# Patient Record
Sex: MALE | Race: OTHER | HISPANIC OR LATINO
[De-identification: names, ages, dates, MRNs, and addresses within clinical notes are randomized per-mention and may not be internally consistent; named-entity substitution may affect disease eponyms.]

---

## 2017-05-30 ENCOUNTER — APPOINTMENT (OUTPATIENT)
Dept: FAMILY MEDICINE | Facility: CLINIC | Age: 62
End: 2017-05-30

## 2017-05-30 VITALS
BODY MASS INDEX: 29.98 KG/M2 | HEIGHT: 67 IN | WEIGHT: 191 LBS | RESPIRATION RATE: 13 BRPM | HEART RATE: 87 BPM | TEMPERATURE: 98 F | DIASTOLIC BLOOD PRESSURE: 78 MMHG | SYSTOLIC BLOOD PRESSURE: 136 MMHG | OXYGEN SATURATION: 98 %

## 2017-05-30 LAB
BILIRUB UR QL STRIP: NORMAL
CLARITY UR: NORMAL
COLLECTION METHOD: NORMAL
GLUCOSE UR-MCNC: NEGATIVE
HCG UR QL: 1 EU/DL
HGB UR QL STRIP.AUTO: NEGATIVE
KETONES UR-MCNC: NORMAL
LEUKOCYTE ESTERASE UR QL STRIP: NEGATIVE
NITRITE UR QL STRIP: NEGATIVE
PH UR STRIP: 5.5
PROT UR STRIP-MCNC: NORMAL
SP GR UR STRIP: 1.02

## 2018-03-15 ENCOUNTER — APPOINTMENT (OUTPATIENT)
Dept: FAMILY MEDICINE | Facility: CLINIC | Age: 63
End: 2018-03-15
Payer: COMMERCIAL

## 2018-03-15 ENCOUNTER — LABORATORY RESULT (OUTPATIENT)
Age: 63
End: 2018-03-15

## 2018-03-15 VITALS
SYSTOLIC BLOOD PRESSURE: 130 MMHG | BODY MASS INDEX: 30.29 KG/M2 | HEIGHT: 67 IN | HEART RATE: 96 BPM | RESPIRATION RATE: 13 BRPM | TEMPERATURE: 98.4 F | DIASTOLIC BLOOD PRESSURE: 76 MMHG | OXYGEN SATURATION: 98 % | WEIGHT: 193 LBS

## 2018-03-15 DIAGNOSIS — R10.2 PELVIC AND PERINEAL PAIN: ICD-10-CM

## 2018-03-15 DIAGNOSIS — Z87.39 PERSONAL HISTORY OF OTHER DISEASES OF THE MUSCULOSKELETAL SYSTEM AND CONNECTIVE TISSUE: ICD-10-CM

## 2018-03-15 PROCEDURE — 36415 COLL VENOUS BLD VENIPUNCTURE: CPT

## 2018-03-15 PROCEDURE — 99214 OFFICE O/P EST MOD 30 MIN: CPT | Mod: 25

## 2018-03-16 LAB
ALBUMIN SERPL ELPH-MCNC: 4.1 G/DL
ALP BLD-CCNC: 120 U/L
ALT SERPL-CCNC: 66 U/L
ANION GAP SERPL CALC-SCNC: 11 MMOL/L
APPEARANCE: CLEAR
AST SERPL-CCNC: 56 U/L
BASOPHILS # BLD AUTO: 0.03 K/UL
BASOPHILS NFR BLD AUTO: 0.4 %
BILIRUB SERPL-MCNC: 0.7 MG/DL
BILIRUBIN URINE: ABNORMAL
BLOOD URINE: NEGATIVE
BUN SERPL-MCNC: 10 MG/DL
CALCIUM SERPL-MCNC: 9.4 MG/DL
CHLORIDE SERPL-SCNC: 95 MMOL/L
CHOLEST SERPL-MCNC: 176 MG/DL
CHOLEST/HDLC SERPL: 3.5 RATIO
CO2 SERPL-SCNC: 29 MMOL/L
COLOR: ABNORMAL
CREAT SERPL-MCNC: 0.7 MG/DL
EOSINOPHIL # BLD AUTO: 0.1 K/UL
EOSINOPHIL NFR BLD AUTO: 1.4 %
FERRITIN SERPL-MCNC: 368 NG/ML
FOLATE SERPL-MCNC: 19.1 NG/ML
GLUCOSE QUALITATIVE U: 1000 MG/DL
GLUCOSE SERPL-MCNC: 287 MG/DL
HBA1C MFR BLD HPLC: 13.3 %
HCT VFR BLD CALC: 46.5 %
HDLC SERPL-MCNC: 50 MG/DL
HGB BLD-MCNC: 16.2 G/DL
IMM GRANULOCYTES NFR BLD AUTO: 0.1 %
IRON SATN MFR SERPL: 29 %
IRON SERPL-MCNC: 97 UG/DL
KETONES URINE: NEGATIVE
LDLC SERPL CALC-MCNC: 107 MG/DL
LEUKOCYTE ESTERASE URINE: NEGATIVE
LYMPHOCYTES # BLD AUTO: 1.85 K/UL
LYMPHOCYTES NFR BLD AUTO: 25.2 %
MAN DIFF?: NORMAL
MCHC RBC-ENTMCNC: 28.8 PG
MCHC RBC-ENTMCNC: 34.8 GM/DL
MCV RBC AUTO: 82.7 FL
MONOCYTES # BLD AUTO: 0.63 K/UL
MONOCYTES NFR BLD AUTO: 8.6 %
NEUTROPHILS # BLD AUTO: 4.73 K/UL
NEUTROPHILS NFR BLD AUTO: 64.3 %
NITRITE URINE: NEGATIVE
PH URINE: 5
PLATELET # BLD AUTO: 195 K/UL
POTASSIUM SERPL-SCNC: 4.4 MMOL/L
PROT SERPL-MCNC: 7.9 G/DL
PROTEIN URINE: 30 MG/DL
PSA SERPL-MCNC: 0.4 NG/ML
RBC # BLD: 5.62 M/UL
RBC # FLD: 13.2 %
SODIUM SERPL-SCNC: 135 MMOL/L
SPECIFIC GRAVITY URINE: 1.04
T4 SERPL-MCNC: 9.9 UG/DL
TIBC SERPL-MCNC: 334 UG/DL
TRIGL SERPL-MCNC: 94 MG/DL
TSH SERPL-ACNC: 1.15 UIU/ML
UIBC SERPL-MCNC: 237 UG/DL
UROBILINOGEN URINE: NEGATIVE MG/DL
VIT B12 SERPL-MCNC: 683 PG/ML
WBC # FLD AUTO: 7.35 K/UL

## 2018-03-19 LAB
25(OH)D3 SERPL-MCNC: 22.7 NG/ML
C PEPTIDE SERPL-MCNC: 3.8 NG/ML

## 2018-03-20 LAB
TESTOST BND SERPL-MCNC: 6.6 PG/ML
TESTOST SERPL-MCNC: 457.1 NG/DL

## 2018-03-23 ENCOUNTER — APPOINTMENT (OUTPATIENT)
Dept: FAMILY MEDICINE | Facility: CLINIC | Age: 63
End: 2018-03-23
Payer: COMMERCIAL

## 2018-03-23 VITALS
RESPIRATION RATE: 14 BRPM | BODY MASS INDEX: 30.13 KG/M2 | OXYGEN SATURATION: 98 % | HEART RATE: 106 BPM | WEIGHT: 192 LBS | TEMPERATURE: 98.2 F | HEIGHT: 67 IN | DIASTOLIC BLOOD PRESSURE: 82 MMHG | SYSTOLIC BLOOD PRESSURE: 124 MMHG

## 2018-03-23 PROCEDURE — 99214 OFFICE O/P EST MOD 30 MIN: CPT

## 2018-03-23 RX ORDER — GLIMEPIRIDE 2 MG/1
2 TABLET ORAL
Refills: 0 | Status: COMPLETED | COMMUNITY
End: 2018-03-23

## 2018-03-30 ENCOUNTER — APPOINTMENT (OUTPATIENT)
Dept: FAMILY MEDICINE | Facility: CLINIC | Age: 63
End: 2018-03-30
Payer: COMMERCIAL

## 2018-03-30 VITALS
SYSTOLIC BLOOD PRESSURE: 124 MMHG | DIASTOLIC BLOOD PRESSURE: 72 MMHG | HEIGHT: 67 IN | WEIGHT: 192 LBS | OXYGEN SATURATION: 98 % | RESPIRATION RATE: 13 BRPM | HEART RATE: 96 BPM | BODY MASS INDEX: 30.13 KG/M2

## 2018-03-30 PROCEDURE — 99214 OFFICE O/P EST MOD 30 MIN: CPT

## 2018-04-10 ENCOUNTER — APPOINTMENT (OUTPATIENT)
Dept: FAMILY MEDICINE | Facility: CLINIC | Age: 63
End: 2018-04-10
Payer: COMMERCIAL

## 2018-04-10 VITALS
DIASTOLIC BLOOD PRESSURE: 70 MMHG | HEIGHT: 67 IN | BODY MASS INDEX: 29.03 KG/M2 | OXYGEN SATURATION: 98 % | SYSTOLIC BLOOD PRESSURE: 122 MMHG | TEMPERATURE: 97.9 F | HEART RATE: 102 BPM | RESPIRATION RATE: 13 BRPM | WEIGHT: 185 LBS

## 2018-04-10 PROCEDURE — 99214 OFFICE O/P EST MOD 30 MIN: CPT

## 2018-04-18 ENCOUNTER — NON-APPOINTMENT (OUTPATIENT)
Age: 63
End: 2018-04-18

## 2018-04-18 ENCOUNTER — APPOINTMENT (OUTPATIENT)
Dept: CARDIOLOGY | Facility: CLINIC | Age: 63
End: 2018-04-18
Payer: COMMERCIAL

## 2018-04-18 VITALS
SYSTOLIC BLOOD PRESSURE: 117 MMHG | DIASTOLIC BLOOD PRESSURE: 76 MMHG | BODY MASS INDEX: 29.6 KG/M2 | OXYGEN SATURATION: 98 % | HEART RATE: 101 BPM | WEIGHT: 189 LBS

## 2018-04-18 PROCEDURE — 99204 OFFICE O/P NEW MOD 45 MIN: CPT

## 2018-04-18 PROCEDURE — 93000 ELECTROCARDIOGRAM COMPLETE: CPT

## 2018-04-18 RX ORDER — ASPIRIN 81 MG/1
81 TABLET, CHEWABLE ORAL
Qty: 30 | Refills: 5 | Status: COMPLETED | COMMUNITY
Start: 2018-04-18

## 2018-04-24 ENCOUNTER — APPOINTMENT (OUTPATIENT)
Dept: UROLOGY | Facility: CLINIC | Age: 63
End: 2018-04-24

## 2018-04-27 ENCOUNTER — APPOINTMENT (OUTPATIENT)
Dept: GASTROENTEROLOGY | Facility: CLINIC | Age: 63
End: 2018-04-27
Payer: COMMERCIAL

## 2018-04-27 VITALS
BODY MASS INDEX: 29.66 KG/M2 | HEART RATE: 101 BPM | OXYGEN SATURATION: 98 % | WEIGHT: 189 LBS | HEIGHT: 67 IN | DIASTOLIC BLOOD PRESSURE: 81 MMHG | RESPIRATION RATE: 16 BRPM | SYSTOLIC BLOOD PRESSURE: 135 MMHG

## 2018-04-27 DIAGNOSIS — Z86.73 PERSONAL HISTORY OF TRANSIENT ISCHEMIC ATTACK (TIA), AND CEREBRAL INFARCTION W/OUT RESIDUAL DEFICITS: ICD-10-CM

## 2018-04-27 PROCEDURE — 99203 OFFICE O/P NEW LOW 30 MIN: CPT

## 2018-05-08 ENCOUNTER — APPOINTMENT (OUTPATIENT)
Dept: FAMILY MEDICINE | Facility: CLINIC | Age: 63
End: 2018-05-08
Payer: COMMERCIAL

## 2018-05-08 ENCOUNTER — MED ADMIN CHARGE (OUTPATIENT)
Age: 63
End: 2018-05-08

## 2018-05-08 VITALS
OXYGEN SATURATION: 98 % | TEMPERATURE: 98.8 F | HEART RATE: 92 BPM | HEIGHT: 67 IN | RESPIRATION RATE: 13 BRPM | SYSTOLIC BLOOD PRESSURE: 120 MMHG | DIASTOLIC BLOOD PRESSURE: 70 MMHG | BODY MASS INDEX: 28.56 KG/M2 | WEIGHT: 182 LBS

## 2018-05-08 PROCEDURE — 90715 TDAP VACCINE 7 YRS/> IM: CPT

## 2018-05-08 PROCEDURE — 90471 IMMUNIZATION ADMIN: CPT | Mod: 59

## 2018-05-08 PROCEDURE — 99214 OFFICE O/P EST MOD 30 MIN: CPT | Mod: 25

## 2018-05-14 ENCOUNTER — APPOINTMENT (OUTPATIENT)
Dept: SURGERY | Facility: CLINIC | Age: 63
End: 2018-05-14

## 2018-05-17 ENCOUNTER — APPOINTMENT (OUTPATIENT)
Dept: CARDIOLOGY | Facility: CLINIC | Age: 63
End: 2018-05-17
Payer: COMMERCIAL

## 2018-05-17 PROCEDURE — 93015 CV STRESS TEST SUPVJ I&R: CPT

## 2018-05-17 PROCEDURE — 93306 TTE W/DOPPLER COMPLETE: CPT

## 2018-05-17 PROCEDURE — A9500: CPT

## 2018-05-17 PROCEDURE — 78452 HT MUSCLE IMAGE SPECT MULT: CPT

## 2018-05-21 ENCOUNTER — APPOINTMENT (OUTPATIENT)
Dept: SURGERY | Facility: CLINIC | Age: 63
End: 2018-05-21
Payer: COMMERCIAL

## 2018-05-21 VITALS
HEART RATE: 98 BPM | DIASTOLIC BLOOD PRESSURE: 83 MMHG | TEMPERATURE: 98.5 F | OXYGEN SATURATION: 98 % | SYSTOLIC BLOOD PRESSURE: 130 MMHG | RESPIRATION RATE: 13 BRPM | BODY MASS INDEX: 28.56 KG/M2 | WEIGHT: 182 LBS | HEIGHT: 67 IN

## 2018-05-21 PROCEDURE — 99204 OFFICE O/P NEW MOD 45 MIN: CPT

## 2018-06-18 ENCOUNTER — APPOINTMENT (OUTPATIENT)
Dept: SURGERY | Facility: CLINIC | Age: 63
End: 2018-06-18
Payer: MEDICARE

## 2018-06-18 VITALS
RESPIRATION RATE: 13 BRPM | BODY MASS INDEX: 28.25 KG/M2 | DIASTOLIC BLOOD PRESSURE: 76 MMHG | HEART RATE: 95 BPM | OXYGEN SATURATION: 97 % | HEIGHT: 67 IN | SYSTOLIC BLOOD PRESSURE: 120 MMHG | WEIGHT: 180 LBS | TEMPERATURE: 98.9 F

## 2018-06-18 PROCEDURE — 99214 OFFICE O/P EST MOD 30 MIN: CPT

## 2018-07-03 ENCOUNTER — APPOINTMENT (OUTPATIENT)
Dept: GASTROENTEROLOGY | Facility: CLINIC | Age: 63
End: 2018-07-03
Payer: MEDICARE

## 2018-07-03 VITALS
HEIGHT: 67 IN | SYSTOLIC BLOOD PRESSURE: 130 MMHG | DIASTOLIC BLOOD PRESSURE: 70 MMHG | WEIGHT: 180 LBS | BODY MASS INDEX: 28.25 KG/M2

## 2018-07-03 PROCEDURE — 99214 OFFICE O/P EST MOD 30 MIN: CPT

## 2018-07-03 RX ORDER — AMOXICILLIN AND CLAVULANATE POTASSIUM 875; 125 MG/1; MG/1
875-125 TABLET, COATED ORAL
Qty: 14 | Refills: 0 | Status: COMPLETED | COMMUNITY
Start: 2018-05-08 | End: 2018-06-19

## 2018-07-07 ENCOUNTER — APPOINTMENT (OUTPATIENT)
Dept: FAMILY MEDICINE | Facility: CLINIC | Age: 63
End: 2018-07-07
Payer: MEDICARE

## 2018-07-07 ENCOUNTER — APPOINTMENT (OUTPATIENT)
Dept: FAMILY MEDICINE | Facility: CLINIC | Age: 63
End: 2018-07-07

## 2018-07-07 VITALS
TEMPERATURE: 98.8 F | RESPIRATION RATE: 13 BRPM | HEART RATE: 90 BPM | WEIGHT: 180 LBS | OXYGEN SATURATION: 97 % | BODY MASS INDEX: 28.25 KG/M2 | DIASTOLIC BLOOD PRESSURE: 82 MMHG | SYSTOLIC BLOOD PRESSURE: 124 MMHG | HEIGHT: 67 IN

## 2018-07-07 PROCEDURE — 36415 COLL VENOUS BLD VENIPUNCTURE: CPT

## 2018-07-07 PROCEDURE — 99214 OFFICE O/P EST MOD 30 MIN: CPT | Mod: 25

## 2018-07-07 NOTE — REVIEW OF SYSTEMS
[Fatigue] : fatigue [Vision Problems] : vision problems [Joint Pain] : joint pain [Joint Stiffness] : joint stiffness [Negative] : Heme/Lymph

## 2018-07-07 NOTE — HISTORY OF PRESENT ILLNESS
[de-identified] : Patient present for follow up of health care . Diet is good and drinking water daily. Compliant with medications . Patient stated he fell 2 months ago. He was home and fell down stairs. Some injury to right knee\par H/O anxiety attacks.

## 2018-07-07 NOTE — HEALTH RISK ASSESSMENT
[Any fall with injury in past year] : Patient reported fall with injury in the past year [3] : 2) Feeling down, depressed, or hopeless for nearly every day (3) [] : No [de-identified] : Social

## 2018-07-07 NOTE — PHYSICAL EXAM
[No Acute Distress] : no acute distress [Well Nourished] : well nourished [Well Developed] : well developed [Normal Sclera/Conjunctiva] : normal sclera/conjunctiva [PERRL] : pupils equal round and reactive to light [Normal Outer Ear/Nose] : the outer ears and nose were normal in appearance [Normal Oropharynx] : the oropharynx was normal [Supple] : supple [No Lymphadenopathy] : no lymphadenopathy [No Respiratory Distress] : no respiratory distress  [Clear to Auscultation] : lungs were clear to auscultation bilaterally [Normal Rate] : normal rate  [Regular Rhythm] : with a regular rhythm [Normal Gait] : normal gait [Coordination Grossly Intact] : coordination grossly intact [No Focal Deficits] : no focal deficits [Normal Affect] : the affect was normal [Normal Insight/Judgement] : insight and judgment were intact

## 2018-07-10 LAB
25(OH)D3 SERPL-MCNC: 47.5 NG/ML
ALBUMIN SERPL ELPH-MCNC: 4.6 G/DL
ALP BLD-CCNC: 90 U/L
ALT SERPL-CCNC: 23 U/L
ANION GAP SERPL CALC-SCNC: 16 MMOL/L
APPEARANCE: CLEAR
AST SERPL-CCNC: 29 U/L
BACTERIA: NEGATIVE
BASOPHILS # BLD AUTO: 0.02 K/UL
BASOPHILS NFR BLD AUTO: 0.2 %
BILIRUB SERPL-MCNC: 0.5 MG/DL
BILIRUBIN URINE: NEGATIVE
BLOOD URINE: NEGATIVE
BUN SERPL-MCNC: 16 MG/DL
CALCIUM SERPL-MCNC: 10 MG/DL
CHLORIDE SERPL-SCNC: 100 MMOL/L
CHOLEST SERPL-MCNC: 181 MG/DL
CHOLEST/HDLC SERPL: 2.6 RATIO
CO2 SERPL-SCNC: 24 MMOL/L
COLOR: YELLOW
CREAT SERPL-MCNC: 0.75 MG/DL
CREAT SPEC-SCNC: 16 MG/DL
EOSINOPHIL # BLD AUTO: 0.15 K/UL
EOSINOPHIL NFR BLD AUTO: 1.8 %
GLUCOSE QUALITATIVE U: 500 MG/DL
GLUCOSE SERPL-MCNC: 95 MG/DL
HBA1C MFR BLD HPLC: 6.1 %
HCT VFR BLD CALC: 45.9 %
HDLC SERPL-MCNC: 69 MG/DL
HGB BLD-MCNC: 15.2 G/DL
HYALINE CASTS: 0 /LPF
IMM GRANULOCYTES NFR BLD AUTO: 0.2 %
KETONES URINE: NEGATIVE
LDLC SERPL CALC-MCNC: 92 MG/DL
LEUKOCYTE ESTERASE URINE: NEGATIVE
LYMPHOCYTES # BLD AUTO: 1.52 K/UL
LYMPHOCYTES NFR BLD AUTO: 18.4 %
MAN DIFF?: NORMAL
MCHC RBC-ENTMCNC: 28.4 PG
MCHC RBC-ENTMCNC: 33.1 GM/DL
MCV RBC AUTO: 85.8 FL
MICROALBUMIN 24H UR DL<=1MG/L-MCNC: 0.4 MG/DL
MICROALBUMIN/CREAT 24H UR-RTO: 25 MG/G
MICROSCOPIC-UA: NORMAL
MONOCYTES # BLD AUTO: 0.67 K/UL
MONOCYTES NFR BLD AUTO: 8.1 %
NEUTROPHILS # BLD AUTO: 5.88 K/UL
NEUTROPHILS NFR BLD AUTO: 71.3 %
NITRITE URINE: NEGATIVE
PH URINE: 6
PLATELET # BLD AUTO: 246 K/UL
POTASSIUM SERPL-SCNC: 4.2 MMOL/L
PROT SERPL-MCNC: 8 G/DL
PROTEIN URINE: NEGATIVE MG/DL
PSA FREE FLD-MCNC: 26.7
PSA FREE SERPL-MCNC: 0.12 NG/ML
PSA SERPL-MCNC: 0.45 NG/ML
RBC # BLD: 5.35 M/UL
RBC # FLD: 13.4 %
RED BLOOD CELLS URINE: 0 /HPF
SODIUM SERPL-SCNC: 140 MMOL/L
SPECIFIC GRAVITY URINE: 1.01
SQUAMOUS EPITHELIAL CELLS: 5 /HPF
TRIGL SERPL-MCNC: 102 MG/DL
TSH SERPL-ACNC: 1.55 UIU/ML
UROBILINOGEN URINE: NEGATIVE MG/DL
WBC # FLD AUTO: 8.26 K/UL
WHITE BLOOD CELLS URINE: 0 /HPF

## 2018-07-18 ENCOUNTER — APPOINTMENT (OUTPATIENT)
Dept: CARDIOLOGY | Facility: CLINIC | Age: 63
End: 2018-07-18
Payer: MEDICARE

## 2018-07-18 ENCOUNTER — NON-APPOINTMENT (OUTPATIENT)
Age: 63
End: 2018-07-18

## 2018-07-18 VITALS
HEIGHT: 67 IN | BODY MASS INDEX: 28.56 KG/M2 | HEART RATE: 92 BPM | DIASTOLIC BLOOD PRESSURE: 78 MMHG | SYSTOLIC BLOOD PRESSURE: 130 MMHG | WEIGHT: 182 LBS | OXYGEN SATURATION: 98 %

## 2018-07-18 VITALS — SYSTOLIC BLOOD PRESSURE: 150 MMHG | DIASTOLIC BLOOD PRESSURE: 86 MMHG

## 2018-07-18 PROCEDURE — 99214 OFFICE O/P EST MOD 30 MIN: CPT | Mod: 25

## 2018-07-18 PROCEDURE — 93000 ELECTROCARDIOGRAM COMPLETE: CPT

## 2018-07-22 PROBLEM — Z86.73 HISTORY OF STROKE: Status: RESOLVED | Noted: 2018-04-18 | Resolved: 2018-07-22

## 2018-07-28 ENCOUNTER — APPOINTMENT (OUTPATIENT)
Dept: FAMILY MEDICINE | Facility: CLINIC | Age: 63
End: 2018-07-28
Payer: MEDICARE

## 2018-07-28 VITALS
RESPIRATION RATE: 13 BRPM | BODY MASS INDEX: 28.56 KG/M2 | HEIGHT: 67 IN | DIASTOLIC BLOOD PRESSURE: 84 MMHG | OXYGEN SATURATION: 97 % | SYSTOLIC BLOOD PRESSURE: 122 MMHG | TEMPERATURE: 98.8 F | HEART RATE: 86 BPM | WEIGHT: 182 LBS

## 2018-07-28 PROCEDURE — 36415 COLL VENOUS BLD VENIPUNCTURE: CPT

## 2018-07-28 PROCEDURE — 99214 OFFICE O/P EST MOD 30 MIN: CPT | Mod: 25

## 2018-07-30 LAB
HCV AB SER QL: NONREACTIVE
HCV S/CO RATIO: 0.1 S/CO
HIV1+2 AB SPEC QL IA.RAPID: NONREACTIVE

## 2018-09-21 ENCOUNTER — APPOINTMENT (OUTPATIENT)
Dept: GASTROENTEROLOGY | Facility: GI CENTER | Age: 63
End: 2018-09-21

## 2018-10-06 ENCOUNTER — OUTPATIENT (OUTPATIENT)
Dept: OUTPATIENT SERVICES | Facility: HOSPITAL | Age: 63
LOS: 1 days | End: 2018-10-06
Payer: MEDICARE

## 2018-10-06 ENCOUNTER — APPOINTMENT (OUTPATIENT)
Dept: CT IMAGING | Facility: CLINIC | Age: 63
End: 2018-10-06
Payer: MEDICARE

## 2018-10-06 DIAGNOSIS — R10.31 RIGHT LOWER QUADRANT PAIN: ICD-10-CM

## 2018-10-06 PROCEDURE — 74177 CT ABD & PELVIS W/CONTRAST: CPT

## 2018-10-06 PROCEDURE — 74177 CT ABD & PELVIS W/CONTRAST: CPT | Mod: 26

## 2018-10-27 ENCOUNTER — APPOINTMENT (OUTPATIENT)
Dept: FAMILY MEDICINE | Facility: CLINIC | Age: 63
End: 2018-10-27

## 2018-11-02 ENCOUNTER — APPOINTMENT (OUTPATIENT)
Dept: SURGERY | Facility: CLINIC | Age: 63
End: 2018-11-02
Payer: MEDICARE

## 2018-11-02 VITALS
DIASTOLIC BLOOD PRESSURE: 91 MMHG | BODY MASS INDEX: 31.39 KG/M2 | OXYGEN SATURATION: 98 % | HEIGHT: 67 IN | WEIGHT: 200 LBS | SYSTOLIC BLOOD PRESSURE: 145 MMHG | TEMPERATURE: 97.9 F | HEART RATE: 87 BPM

## 2018-11-02 PROCEDURE — 99214 OFFICE O/P EST MOD 30 MIN: CPT

## 2019-01-22 RX ORDER — POLYETHYLENE GLYOCOL 3350, SODIUM CHLORIDE, SODIUM BICARBONATE AND POTASSIUM CHLORIDE 420; 11.2; 5.72; 1.48 G/4L; G/4L; G/4L; G/4L
420 POWDER, FOR SOLUTION NASOGASTRIC; ORAL
Qty: 1 | Refills: 0 | Status: DISCONTINUED | COMMUNITY
Start: 2018-07-03 | End: 2019-01-22

## 2019-01-23 ENCOUNTER — APPOINTMENT (OUTPATIENT)
Dept: CARDIOLOGY | Facility: CLINIC | Age: 64
End: 2019-01-23

## 2019-02-04 ENCOUNTER — APPOINTMENT (OUTPATIENT)
Dept: SURGERY | Facility: CLINIC | Age: 64
End: 2019-02-04

## 2019-02-19 ENCOUNTER — APPOINTMENT (OUTPATIENT)
Dept: FAMILY MEDICINE | Facility: CLINIC | Age: 64
End: 2019-02-19
Payer: MEDICARE

## 2019-02-19 ENCOUNTER — LABORATORY RESULT (OUTPATIENT)
Age: 64
End: 2019-02-19

## 2019-02-19 VITALS
WEIGHT: 200 LBS | HEIGHT: 67 IN | DIASTOLIC BLOOD PRESSURE: 80 MMHG | OXYGEN SATURATION: 98 % | BODY MASS INDEX: 31.39 KG/M2 | TEMPERATURE: 98.2 F | SYSTOLIC BLOOD PRESSURE: 134 MMHG | RESPIRATION RATE: 13 BRPM | HEART RATE: 78 BPM
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
## 2019-02-19 DIAGNOSIS — R10.31 RIGHT LOWER QUADRANT PAIN: ICD-10-CM

## 2019-02-19 PROCEDURE — 36415 COLL VENOUS BLD VENIPUNCTURE: CPT

## 2019-02-19 PROCEDURE — 99214 OFFICE O/P EST MOD 30 MIN: CPT | Mod: 25

## 2019-02-21 LAB
ALBUMIN SERPL ELPH-MCNC: 4.4 G/DL
ALP BLD-CCNC: 99 U/L
ALT SERPL-CCNC: 41 U/L
ANION GAP SERPL CALC-SCNC: 19 MMOL/L
APPEARANCE: ABNORMAL
AST SERPL-CCNC: 42 U/L
BASOPHILS # BLD AUTO: 0.03 K/UL
BASOPHILS NFR BLD AUTO: 0.5 %
BILIRUB SERPL-MCNC: 0.4 MG/DL
BILIRUBIN URINE: NEGATIVE
BLOOD URINE: NEGATIVE
BUN SERPL-MCNC: 15 MG/DL
C PEPTIDE SERPL-MCNC: 5.6 NG/ML
CALCIUM SERPL-MCNC: 9.8 MG/DL
CHLORIDE SERPL-SCNC: 102 MMOL/L
CHOLEST SERPL-MCNC: 148 MG/DL
CHOLEST/HDLC SERPL: 3.7 RATIO
CO2 SERPL-SCNC: 22 MMOL/L
COLOR: ABNORMAL
CREAT SERPL-MCNC: 0.73 MG/DL
CREAT SPEC-SCNC: 195 MG/DL
EOSINOPHIL # BLD AUTO: 0.2 K/UL
EOSINOPHIL NFR BLD AUTO: 3.1 %
FERRITIN SERPL-MCNC: 297 NG/ML
FOLATE SERPL-MCNC: >20 NG/ML
GLUCOSE QUALITATIVE U: ABNORMAL
GLUCOSE SERPL-MCNC: 86 MG/DL
HBA1C MFR BLD HPLC: 7.2 %
HCT VFR BLD CALC: 49.7 %
HDLC SERPL-MCNC: 40 MG/DL
HGB BLD-MCNC: 15.7 G/DL
IMM GRANULOCYTES NFR BLD AUTO: 0.2 %
IRON SATN MFR SERPL: 17 %
IRON SERPL-MCNC: 59 UG/DL
KETONES URINE: NEGATIVE
LDLC SERPL CALC-MCNC: 87 MG/DL
LEUKOCYTE ESTERASE URINE: NEGATIVE
LYMPHOCYTES # BLD AUTO: 1.57 K/UL
LYMPHOCYTES NFR BLD AUTO: 24.6 %
MAN DIFF?: NORMAL
MCHC RBC-ENTMCNC: 27.7 PG
MCHC RBC-ENTMCNC: 31.6 GM/DL
MCV RBC AUTO: 87.7 FL
MICROALBUMIN 24H UR DL<=1MG/L-MCNC: 16.4 MG/DL
MICROALBUMIN/CREAT 24H UR-RTO: 84 MG/G
MONOCYTES # BLD AUTO: 0.92 K/UL
MONOCYTES NFR BLD AUTO: 14.4 %
NEUTROPHILS # BLD AUTO: 3.64 K/UL
NEUTROPHILS NFR BLD AUTO: 57.2 %
NITRITE URINE: NEGATIVE
PH URINE: 5.5
PLATELET # BLD AUTO: 201 K/UL
POTASSIUM SERPL-SCNC: 3.9 MMOL/L
PROT SERPL-MCNC: 8 G/DL
PROTEIN URINE: ABNORMAL
PSA SERPL-MCNC: 0.2 NG/ML
RBC # BLD: 5.67 M/UL
RBC # FLD: 13.1 %
SODIUM SERPL-SCNC: 143 MMOL/L
SPECIFIC GRAVITY URINE: 1.05
T4 SERPL-MCNC: 8.4 UG/DL
TIBC SERPL-MCNC: 349 UG/DL
TRIGL SERPL-MCNC: 105 MG/DL
TSH SERPL-ACNC: 2.95 UIU/ML
UIBC SERPL-MCNC: 290 UG/DL
UROBILINOGEN URINE: NORMAL
VIT B12 SERPL-MCNC: 462 PG/ML
WBC # FLD AUTO: 6.37 K/UL

## 2019-02-25 LAB — 25(OH)D3 SERPL-MCNC: 33.3 NG/ML

## 2019-03-12 ENCOUNTER — APPOINTMENT (OUTPATIENT)
Dept: FAMILY MEDICINE | Facility: CLINIC | Age: 64
End: 2019-03-12
Payer: MEDICARE

## 2019-03-12 VITALS
TEMPERATURE: 98.5 F | DIASTOLIC BLOOD PRESSURE: 72 MMHG | OXYGEN SATURATION: 98 % | RESPIRATION RATE: 13 BRPM | HEART RATE: 96 BPM | BODY MASS INDEX: 33.27 KG/M2 | WEIGHT: 212 LBS | HEIGHT: 67 IN | SYSTOLIC BLOOD PRESSURE: 124 MMHG

## 2019-03-12 DIAGNOSIS — J06.9 ACUTE UPPER RESPIRATORY INFECTION, UNSPECIFIED: ICD-10-CM

## 2019-03-12 DIAGNOSIS — Z87.891 PERSONAL HISTORY OF NICOTINE DEPENDENCE: ICD-10-CM

## 2019-03-12 PROCEDURE — 99214 OFFICE O/P EST MOD 30 MIN: CPT

## 2019-03-14 LAB — HEMOCCULT STL QL IA: NEGATIVE

## 2019-07-16 ENCOUNTER — APPOINTMENT (OUTPATIENT)
Dept: FAMILY MEDICINE | Facility: CLINIC | Age: 64
End: 2019-07-16

## 2019-07-29 ENCOUNTER — APPOINTMENT (OUTPATIENT)
Dept: SURGERY | Facility: CLINIC | Age: 64
End: 2019-07-29
Payer: MEDICARE

## 2019-07-29 VITALS
TEMPERATURE: 98.1 F | BODY MASS INDEX: 31.64 KG/M2 | HEART RATE: 108 BPM | WEIGHT: 202 LBS | DIASTOLIC BLOOD PRESSURE: 96 MMHG | OXYGEN SATURATION: 98 % | SYSTOLIC BLOOD PRESSURE: 142 MMHG

## 2019-07-29 PROCEDURE — 99214 OFFICE O/P EST MOD 30 MIN: CPT

## 2019-07-29 NOTE — HISTORY OF PRESENT ILLNESS
[de-identified] : The patient returns to the office with intermittent mild burning discomfort with activity.  He states that he has not been able to lose any weight.  He has no nausea no vomit, no fevers or chills. He notes no changes in the hernias.

## 2019-07-29 NOTE — ASSESSMENT
[FreeTextEntry1] : The patient is an obese 64 year old male with stable recurrent bilateral inguinal hernias and a stable umbilical hernia.  The patient has been advised that weight loss will be an important adjunct to help potentially reduce the risks of infection, hernia recurrence, and chronic post operative pain associated with surgery by potentially reducing the tension along the abdominal wall.  The patient will embark on weigh loss and follow up in 3 months or sooner should any problems arise. \par

## 2019-07-29 NOTE — PHYSICAL EXAM
[JVD] : no jugular venous distention  [No Rash or Lesion] : No rash or lesion [Purpura] : no purpura  [Petechiae] : no petechiae [Skin Ulcer] : no ulcer [Skin Induration] : no induration [Alert] : alert [Oriented to Person] : oriented to person [Oriented to Place] : oriented to place [Oriented to Time] : oriented to time [Calm] : calm [de-identified] : NC/AT PERRL EOMI no scleral icterus [de-identified] : non toxic, in no acute distress [de-identified] : trachea midline, no gross mass [de-identified] : phallus normal, no scrotal mass or tenderness [de-identified] : no audible wheezing or stridor [de-identified] : obese soft, redundant pannus of the lower abdominal wall, no tenderness, no guarding, no rebound  [de-identified] : FROM of all extremities with no gross deformity or angulation, there is a small non tender reducible umbilical hernia that remains stable, there remains evidence of recurrent inguinal hernias  on the left and the right side that was not apparent on prior exam [de-identified] : bilateral inguinal scars consistent with the patient's prior surgical history [de-identified] : mood is calm

## 2019-10-23 ENCOUNTER — APPOINTMENT (OUTPATIENT)
Dept: SURGERY | Facility: CLINIC | Age: 64
End: 2019-10-23

## 2020-05-05 ENCOUNTER — NON-APPOINTMENT (OUTPATIENT)
Age: 65
End: 2020-05-05

## 2020-05-05 ENCOUNTER — APPOINTMENT (OUTPATIENT)
Dept: FAMILY MEDICINE | Facility: CLINIC | Age: 65
End: 2020-05-05
Payer: MEDICARE

## 2020-05-05 VITALS
BODY MASS INDEX: 31.71 KG/M2 | SYSTOLIC BLOOD PRESSURE: 134 MMHG | RESPIRATION RATE: 13 BRPM | OXYGEN SATURATION: 98 % | WEIGHT: 202 LBS | HEART RATE: 91 BPM | DIASTOLIC BLOOD PRESSURE: 78 MMHG | HEIGHT: 67 IN

## 2020-05-05 DIAGNOSIS — K46.9 UNSPECIFIED ABDOMINAL HERNIA W/OUT OBSTRUCTION OR GANGRENE: ICD-10-CM

## 2020-05-05 DIAGNOSIS — Z12.11 ENCOUNTER FOR SCREENING FOR MALIGNANT NEOPLASM OF COLON: ICD-10-CM

## 2020-05-05 DIAGNOSIS — Z12.5 ENCOUNTER FOR SCREENING FOR MALIGNANT NEOPLASM OF PROSTATE: ICD-10-CM

## 2020-05-05 DIAGNOSIS — Z87.898 PERSONAL HISTORY OF OTHER SPECIFIED CONDITIONS: ICD-10-CM

## 2020-05-05 DIAGNOSIS — K40.21 BILATERAL INGUINAL HERNIA, W/OUT OBSTRUCTION OR GANGRENE, RECURRENT: ICD-10-CM

## 2020-05-05 LAB — HBA1C MFR BLD HPLC: 14

## 2020-05-05 PROCEDURE — 93000 ELECTROCARDIOGRAM COMPLETE: CPT | Mod: 59

## 2020-05-05 PROCEDURE — 99214 OFFICE O/P EST MOD 30 MIN: CPT | Mod: 25

## 2020-05-05 PROCEDURE — 83036 HEMOGLOBIN GLYCOSYLATED A1C: CPT | Mod: QW

## 2020-05-05 RX ORDER — AZITHROMYCIN 250 MG/1
250 TABLET, FILM COATED ORAL
Qty: 1 | Refills: 2 | Status: DISCONTINUED | COMMUNITY
Start: 2019-02-19 | End: 2020-05-05

## 2020-05-05 RX ORDER — MELOXICAM 7.5 MG/1
7.5 TABLET ORAL
Qty: 30 | Refills: 0 | Status: DISCONTINUED | COMMUNITY
Start: 2018-10-27 | End: 2020-05-05

## 2020-05-05 RX ORDER — SILDENAFIL 100 MG/1
100 TABLET, FILM COATED ORAL
Qty: 6 | Refills: 5 | Status: DISCONTINUED | COMMUNITY
Start: 2019-02-19 | End: 2020-05-05

## 2020-05-05 NOTE — PHYSICAL EXAM
[Well Nourished] : well nourished [No Acute Distress] : no acute distress [Normal Sclera/Conjunctiva] : normal sclera/conjunctiva [PERRL] : pupils equal round and reactive to light [Normal Outer Ear/Nose] : the outer ears and nose were normal in appearance [EOMI] : extraocular movements intact [No JVD] : no jugular venous distention [Normal Oropharynx] : the oropharynx was normal [Supple] : supple [No Lymphadenopathy] : no lymphadenopathy [Thyroid Normal, No Nodules] : the thyroid was normal and there were no nodules present [No Respiratory Distress] : no respiratory distress  [No Accessory Muscle Use] : no accessory muscle use [Clear to Auscultation] : lungs were clear to auscultation bilaterally [Normal Rate] : normal rate  [Regular Rhythm] : with a regular rhythm [Normal S1, S2] : normal S1 and S2 [No Murmur] : no murmur heard [No Carotid Bruits] : no carotid bruits [No Abdominal Bruit] : a ~M bruit was not heard ~T in the abdomen [No Varicosities] : no varicosities [Pedal Pulses Present] : the pedal pulses are present [No Edema] : there was no peripheral edema [No Palpable Aorta] : no palpable aorta [Soft] : abdomen soft [No Extremity Clubbing/Cyanosis] : no extremity clubbing/cyanosis [Non Tender] : non-tender [Non-distended] : non-distended [No HSM] : no HSM [No Masses] : no abdominal mass palpated [Normal Posterior Cervical Nodes] : no posterior cervical lymphadenopathy [Normal Bowel Sounds] : normal bowel sounds [Normal Anterior Cervical Nodes] : no anterior cervical lymphadenopathy [No CVA Tenderness] : no CVA  tenderness [No Spinal Tenderness] : no spinal tenderness [Grossly Normal Strength/Tone] : grossly normal strength/tone [No Joint Swelling] : no joint swelling [No Rash] : no rash [Coordination Grossly Intact] : coordination grossly intact [No Focal Deficits] : no focal deficits [Normal Gait] : normal gait [Deep Tendon Reflexes (DTR)] : deep tendon reflexes were 2+ and symmetric [Normal Affect] : the affect was normal [Normal Insight/Judgement] : insight and judgment were intact [de-identified] : obese [Normal] : affect was normal and insight and judgment were intact [de-identified] : ankle edema

## 2020-05-05 NOTE — HISTORY OF PRESENT ILLNESS
[FreeTextEntry1] : Patient in for chest tightness and back pain, has not taken Meds in months had gone to [place], patient reports some weight gain and SOBOE has not taken Meds in months not following diet or checking glucose last time was 8 months

## 2020-05-05 NOTE — HEALTH RISK ASSESSMENT
[2 - 4 times a month (2 pts)] : 2-4 times a month (2 points) [] : No [1 or 2 (0 pts)] : 1 or 2 (0 points) [Never (0 pts)] : Never (0 points) [No] : In the past 12 months have you used drugs other than those required for medical reasons? No [No falls in past year] : Patient reported no falls in the past year [0] : 2) Feeling down, depressed, or hopeless: Not at all (0) [Audit-CScore] : 2 [[id number]] : 0

## 2020-05-05 NOTE — PLAN
[FreeTextEntry1] : Patient in for chest tightness and back pain, has not taken Meds in months had gone to [place], patient reports some weight gain and SOBOE has not taken Meds in months not following diet or checking glucose \par \par Ekg reviewed\par POCT A1c  14 or greater\par Meds provided\par CXR \par EKG reviewed  Cardio provided\par Diabetic care plan \par Extended visit for non compliance issues\par RTC 2 week s

## 2020-05-05 NOTE — REVIEW OF SYSTEMS
[Fatigue] : fatigue [Chest Pain] : chest pain [Palpitations] : palpitations [Lower Ext Edema] : lower extremity edema [Paroxysmal Nocturnal Dyspnea] : paroxysmal nocturnal dyspnea [Back Pain] : back pain [Negative] : Heme/Lymph [Fever] : no fever [Night Sweats] : no night sweats [Earache] : no earache [Hearing Loss] : no hearing loss [Nosebleeds] : no nosebleeds [Nasal Discharge] : no nasal discharge [Postnasal Drip] : no postnasal drip [Sore Throat] : no sore throat [Cough] : no cough [Hoarseness] : no hoarseness [Heartburn] : no heartburn [Nausea] : no nausea [Melena] : no melena [Hesitancy] : no hesitancy [Muscle Weakness] : no muscle weakness [Joint Pain] : no joint pain [Mole Changes] : no mole changes [Itching] : no itching [Hair Changes] : no hair changes [Insomnia] : no insomnia [Depression] : no depression

## 2020-05-05 NOTE — COUNSELING
[Fall prevention counseling provided] : Fall prevention counseling provided [Use proper foot wear] : Use proper foot wear [Behavioral health counseling provided] : Behavioral health counseling provided [Engage in a relaxing activity] : Engage in a relaxing activity [Potential consequences of obesity discussed] : Potential consequences of obesity discussed [Benefits of weight loss discussed] : Benefits of weight loss discussed [Encouraged to increase physical activity] : Encouraged to increase physical activity [Encouraged to use exercise tracking device] : Encouraged to use exercise tracking device [Weigh Self Weekly] : weigh self weekly [Patient Non-adherent to care plan] : Patient non-adherent to care plan [Patient motivation] : Patient motivation [Needs reinforcement, provided] : Patient needs reinforcement on understanding of lifestyle changes and steps needed to achieve self management goal; reinforcement was provided

## 2020-05-06 LAB
APPEARANCE: CLEAR
BASOPHILS # BLD AUTO: 0.05 K/UL
BASOPHILS NFR BLD AUTO: 0.7 %
BILIRUBIN URINE: NEGATIVE
BLOOD URINE: NEGATIVE
CHOLEST SERPL-MCNC: 157 MG/DL
CHOLEST/HDLC SERPL: 3 RATIO
COLOR: NORMAL
EOSINOPHIL # BLD AUTO: 0.12 K/UL
EOSINOPHIL NFR BLD AUTO: 1.6 %
ERYTHROCYTE [SEDIMENTATION RATE] IN BLOOD BY WESTERGREN METHOD: 54 MM/HR
FOLATE SERPL-MCNC: >20 NG/ML
GLUCOSE QUALITATIVE U: ABNORMAL
HCT VFR BLD CALC: 44.5 %
HDLC SERPL-MCNC: 52 MG/DL
HGB BLD-MCNC: 14.5 G/DL
IMM GRANULOCYTES NFR BLD AUTO: 0.3 %
KETONES URINE: NEGATIVE
LDLC SERPL CALC-MCNC: 87 MG/DL
LEUKOCYTE ESTERASE URINE: NEGATIVE
LYMPHOCYTES # BLD AUTO: 2.11 K/UL
LYMPHOCYTES NFR BLD AUTO: 28.3 %
MAN DIFF?: NORMAL
MCHC RBC-ENTMCNC: 28.2 PG
MCHC RBC-ENTMCNC: 32.6 GM/DL
MCV RBC AUTO: 86.4 FL
MONOCYTES # BLD AUTO: 0.77 K/UL
MONOCYTES NFR BLD AUTO: 10.3 %
NEUTROPHILS # BLD AUTO: 4.39 K/UL
NEUTROPHILS NFR BLD AUTO: 58.8 %
NITRITE URINE: NEGATIVE
PH URINE: 5.5
PLATELET # BLD AUTO: 220 K/UL
PROTEIN URINE: NEGATIVE
RBC # BLD: 5.15 M/UL
RBC # FLD: 12.4 %
SPECIFIC GRAVITY URINE: 1.01
TRIGL SERPL-MCNC: 90 MG/DL
TSH SERPL-ACNC: 1.58 UIU/ML
UROBILINOGEN URINE: NORMAL
VIT B12 SERPL-MCNC: 563 PG/ML
WBC # FLD AUTO: 7.46 K/UL

## 2020-05-07 ENCOUNTER — OUTPATIENT (OUTPATIENT)
Dept: OUTPATIENT SERVICES | Facility: HOSPITAL | Age: 65
LOS: 1 days | End: 2020-05-07
Payer: MEDICARE

## 2020-05-07 ENCOUNTER — APPOINTMENT (OUTPATIENT)
Dept: RADIOLOGY | Facility: CLINIC | Age: 65
End: 2020-05-07
Payer: MEDICARE

## 2020-05-07 DIAGNOSIS — R07.9 CHEST PAIN, UNSPECIFIED: ICD-10-CM

## 2020-05-07 DIAGNOSIS — Z00.00 ENCOUNTER FOR GENERAL ADULT MEDICAL EXAMINATION WITHOUT ABNORMAL FINDINGS: ICD-10-CM

## 2020-05-07 PROCEDURE — 71046 X-RAY EXAM CHEST 2 VIEWS: CPT | Mod: 26

## 2020-05-07 PROCEDURE — 71046 X-RAY EXAM CHEST 2 VIEWS: CPT

## 2020-05-08 LAB
ALBUMIN SERPL ELPH-MCNC: 4.3 G/DL
ALP BLD-CCNC: 87 U/L
ALT SERPL-CCNC: 46 U/L
ANION GAP SERPL CALC-SCNC: 11 MMOL/L
AST SERPL-CCNC: 53 U/L
BILIRUB SERPL-MCNC: 0.4 MG/DL
BUN SERPL-MCNC: 11 MG/DL
CALCIUM SERPL-MCNC: 9.6 MG/DL
CHLORIDE SERPL-SCNC: 99 MMOL/L
CO2 SERPL-SCNC: 26 MMOL/L
CREAT SERPL-MCNC: 0.57 MG/DL
GLUCOSE SERPL-MCNC: 241 MG/DL
POTASSIUM SERPL-SCNC: 4.1 MMOL/L
PROT SERPL-MCNC: 7 G/DL
SODIUM SERPL-SCNC: 137 MMOL/L

## 2020-05-19 ENCOUNTER — APPOINTMENT (OUTPATIENT)
Dept: FAMILY MEDICINE | Facility: CLINIC | Age: 65
End: 2020-05-19
Payer: MEDICARE

## 2020-05-19 VITALS
RESPIRATION RATE: 13 BRPM | OXYGEN SATURATION: 98 % | HEART RATE: 88 BPM | DIASTOLIC BLOOD PRESSURE: 78 MMHG | WEIGHT: 198 LBS | SYSTOLIC BLOOD PRESSURE: 128 MMHG | HEIGHT: 67 IN | BODY MASS INDEX: 31.08 KG/M2

## 2020-05-19 LAB — GLUCOSE BLDC GLUCOMTR-MCNC: 105

## 2020-05-19 PROCEDURE — 99214 OFFICE O/P EST MOD 30 MIN: CPT

## 2020-05-19 PROCEDURE — 82962 GLUCOSE BLOOD TEST: CPT

## 2020-05-19 NOTE — PHYSICAL EXAM
[No Acute Distress] : no acute distress [Well Nourished] : well nourished [PERRL] : pupils equal round and reactive to light [Normal Sclera/Conjunctiva] : normal sclera/conjunctiva [EOMI] : extraocular movements intact [Normal Oropharynx] : the oropharynx was normal [Normal Outer Ear/Nose] : the outer ears and nose were normal in appearance [No JVD] : no jugular venous distention [No Lymphadenopathy] : no lymphadenopathy [Supple] : supple [No Respiratory Distress] : no respiratory distress  [Thyroid Normal, No Nodules] : the thyroid was normal and there were no nodules present [No Accessory Muscle Use] : no accessory muscle use [Clear to Auscultation] : lungs were clear to auscultation bilaterally [Normal Rate] : normal rate  [Regular Rhythm] : with a regular rhythm [Normal S1, S2] : normal S1 and S2 [No Murmur] : no murmur heard [No Carotid Bruits] : no carotid bruits [No Abdominal Bruit] : a ~M bruit was not heard ~T in the abdomen [Pedal Pulses Present] : the pedal pulses are present [No Varicosities] : no varicosities [No Edema] : there was no peripheral edema [No Palpable Aorta] : no palpable aorta [Soft] : abdomen soft [No Extremity Clubbing/Cyanosis] : no extremity clubbing/cyanosis [Non Tender] : non-tender [No Masses] : no abdominal mass palpated [Non-distended] : non-distended [Normal Bowel Sounds] : normal bowel sounds [No HSM] : no HSM [Normal Posterior Cervical Nodes] : no posterior cervical lymphadenopathy [Normal Anterior Cervical Nodes] : no anterior cervical lymphadenopathy [No Spinal Tenderness] : no spinal tenderness [No CVA Tenderness] : no CVA  tenderness [No Joint Swelling] : no joint swelling [Grossly Normal Strength/Tone] : grossly normal strength/tone [No Rash] : no rash [Coordination Grossly Intact] : coordination grossly intact [No Focal Deficits] : no focal deficits [Deep Tendon Reflexes (DTR)] : deep tendon reflexes were 2+ and symmetric [Normal Gait] : normal gait [Normal Affect] : the affect was normal [Normal Insight/Judgement] : insight and judgment were intact [de-identified] : obese [Normal] : affect was normal and insight and judgment were intact [de-identified] : ankle edema

## 2020-05-19 NOTE — HISTORY OF PRESENT ILLNESS
[FreeTextEntry1] : Patient in for follow up of poorly controlled DM patient having weight loss and better diet, did not get test kit, Still eith hernia, PMH HTN HLD GERD  needs labs and meds

## 2020-05-19 NOTE — COUNSELING
[Fall prevention counseling provided] : Fall prevention counseling provided [Engage in a relaxing activity] : Engage in a relaxing activity [Use proper foot wear] : Use proper foot wear [Behavioral health counseling provided] : Behavioral health counseling provided [Good understanding] : Patient has a good understanding of lifestyle changes and steps needed to achieve self management goal [None] : None

## 2020-05-19 NOTE — REVIEW OF SYSTEMS
[Fever] : no fever [Fatigue] : fatigue [Night Sweats] : no night sweats [Hearing Loss] : no hearing loss [Earache] : no earache [Nosebleeds] : no nosebleeds [Postnasal Drip] : no postnasal drip [Nasal Discharge] : no nasal discharge [Sore Throat] : no sore throat [Hoarseness] : no hoarseness [Chest Pain] : no chest pain [Palpitations] : no palpitations [Lower Ext Edema] : lower extremity edema [Paroxysmal Nocturnal Dyspnea] : paroxysmal nocturnal dyspnea [Cough] : no cough [Heartburn] : no heartburn [Nausea] : no nausea [Hesitancy] : no hesitancy [Melena] : no melena [Muscle Weakness] : no muscle weakness [Joint Pain] : no joint pain [Back Pain] : back pain [Itching] : no itching [Hair Changes] : no hair changes [Mole Changes] : no mole changes [Insomnia] : no insomnia [Depression] : no depression [Negative] : Heme/Lymph

## 2020-05-20 ENCOUNTER — APPOINTMENT (OUTPATIENT)
Dept: CARDIOLOGY | Facility: CLINIC | Age: 65
End: 2020-05-20
Payer: MEDICARE

## 2020-05-20 VITALS
SYSTOLIC BLOOD PRESSURE: 153 MMHG | DIASTOLIC BLOOD PRESSURE: 85 MMHG | OXYGEN SATURATION: 98 % | BODY MASS INDEX: 28.25 KG/M2 | TEMPERATURE: 98.6 F | WEIGHT: 180 LBS | HEIGHT: 67 IN | HEART RATE: 96 BPM

## 2020-05-20 LAB
BASOPHILS # BLD AUTO: 0.04 K/UL
BASOPHILS NFR BLD AUTO: 0.6 %
EOSINOPHIL # BLD AUTO: 0.13 K/UL
EOSINOPHIL NFR BLD AUTO: 1.9 %
HCT VFR BLD CALC: 43.4 %
HGB BLD-MCNC: 14 G/DL
IMM GRANULOCYTES NFR BLD AUTO: 0.1 %
LYMPHOCYTES # BLD AUTO: 2.37 K/UL
LYMPHOCYTES NFR BLD AUTO: 34 %
MAN DIFF?: NORMAL
MCHC RBC-ENTMCNC: 28.3 PG
MCHC RBC-ENTMCNC: 32.3 GM/DL
MCV RBC AUTO: 87.7 FL
MONOCYTES # BLD AUTO: 0.62 K/UL
MONOCYTES NFR BLD AUTO: 8.9 %
NEUTROPHILS # BLD AUTO: 3.8 K/UL
NEUTROPHILS NFR BLD AUTO: 54.5 %
PLATELET # BLD AUTO: 211 K/UL
RBC # BLD: 4.95 M/UL
RBC # FLD: 12.5 %
WBC # FLD AUTO: 6.97 K/UL

## 2020-05-20 PROCEDURE — 99214 OFFICE O/P EST MOD 30 MIN: CPT

## 2020-05-20 PROCEDURE — 93000 ELECTROCARDIOGRAM COMPLETE: CPT

## 2020-05-20 NOTE — CARDIOLOGY SUMMARY
[No Ischemia] : no Ischemia [No Exercise Ind Arr] : no exercise induced arrhythmias [No Symptoms] : no Symptoms [___] : [unfilled] [Normal] : normal [___] : [unfilled] [LVEF ___%] : LVEF [unfilled]%

## 2020-05-20 NOTE — DISCUSSION/SUMMARY
[Risks] : risks [Patient] : the patient [Benefits] : benefits [Alternatives] : alternatives [FreeTextEntry1] : 64 yo man with prolonged history of atypical chest pain, b/l inguinal hernia and hiatal hernia. \par \par Plan:\par -Schedule Pharmacological stress test to R/O  any ischemic heart diseases. unable to walk on treadmill due to b/l inguinal hernia and also covid pandemic protocol. \par - Repeat echocardiogram , mild MR and dilated aortic root in 2018 Echo.\par - His Bp is elevated today, states that his home BP is always in less than 120's systolic, instructed him to check home BP every day and keep diary. If it is > 130's systolic advised him to call PCP's office for BP management. \par - 6 months follow up in office or sooner if needed.\par \par \par Plan d/w Dr. Bloom. \par CRISTIANA Ulloa.

## 2020-05-20 NOTE — PHYSICAL EXAM
[General Appearance - Well Developed] : well developed [General Appearance - Well Nourished] : well nourished [Normal Conjunctiva] : the conjunctiva exhibited no abnormalities [Normal Oral Mucosa] : normal oral mucosa [Normal Jugular Venous V Waves Present] : normal jugular venous V waves present [Respiration, Rhythm And Depth] : normal respiratory rhythm and effort [Auscultation Breath Sounds / Voice Sounds] : lungs were clear to auscultation bilaterally [Exaggerated Use Of Accessory Muscles For Inspiration] : no accessory muscle use [Chest Palpation] : palpation of the chest revealed no abnormalities [Lungs Percussion] : the lungs were normal to percussion [Heart Rate And Rhythm] : heart rate and rhythm were normal [Heart Sounds] : normal S1 and S2 [Murmurs] : no murmurs present [Arterial Pulses Normal] : the arterial pulses were normal [Edema] : no peripheral edema present [Veins - Varicosity Changes] : no varicosital changes were noted in the lower extremities [Bowel Sounds] : normal bowel sounds [Abdomen Tenderness] : non-tender [Abdomen Soft] : soft [Abdomen Hernia] : no hernia was discovered [Abdomen Mass (___ Cm)] : no abdominal mass palpated [Nail Clubbing] : no clubbing of the fingernails [Abnormal Walk] : normal gait [Cyanosis, Localized] : no localized cyanosis [Skin Color & Pigmentation] : normal skin color and pigmentation [Skin Turgor] : normal skin turgor [] : no rash [Oriented To Time, Place, And Person] : oriented to person, place, and time [Impaired Insight] : insight and judgment were intact [No Deformities] : no deformities [General Appearance - In No Acute Distress] : no acute distress [Mood] : the mood was normal [FreeTextEntry1] : unable to walk on treadmill due to inguinal hernia.

## 2020-05-20 NOTE — HISTORY OF PRESENT ILLNESS
[FreeTextEntry1] : 61 yo man, , father of one son. No prior history of heart disease. C/O chest pain since 1995 that has been evaluated in the past, atypical, constant, pressure like, associated with SOB, especially when moving the left arm. Was treated with NTG for a period (2000) with no response. \par In May 2018 underwent a nuclear stress test (8 METS) and there was no evidence of ischemia and echocardiogram revealed a normal LV function (55%) with mild dilatation of the aortic root. Not clear what tests he had done in the past.\par Still C/O chest pain, associated with any type of movement of the chest or by simply touching the chest/ribs, or with deep inspiration, radiates to the back. Worse also when he climbs stairs. Denies palpitations, dizziness or ankle swelling. No orthopnea or PND. \par Has DM since 2013 that is treated with meds\par Smoked for 30 years and stopped in 2013 \par \par 5/20/2020\par Patient reports for follow up visit. Last office visit was in 7/2018.  He still has c/o chronic chest pain, which he described same as when he came in 2018. Today he denies any GUADARRAMA at rest, palpitations, dizziness, leg edema, n/v/d, syncope or near syncope.  He has b/l inguinal and hiatal hernia which gives him pain and discomfort. He has been seeing Dr. Tereso Milian. The patient has been advised to weight loss in order for them to do lap  surgery.  His BP is elevated today, he states that usually his BP is less than 120's systolic, Admit non compliant with salt intake and sugary drinks. He does not do any exercise due to hernia. \par His recent A1C - 14. lost ~ 20 pounds since last visit.

## 2020-05-20 NOTE — REVIEW OF SYSTEMS
[Chest Pain] : chest pain [see HPI] : see HPI [Negative] : Gastrointestinal [Nausea] : no nausea [Easy Bleeding] : no tendency for easy bleeding [Dizziness] : no dizziness [Easy Bruising] : no tendency for easy bruising

## 2020-05-28 LAB
ALBUMIN SERPL ELPH-MCNC: 4.4 G/DL
ALP BLD-CCNC: 84 U/L
ALT SERPL-CCNC: 39 U/L
ANION GAP SERPL CALC-SCNC: 13 MMOL/L
AST SERPL-CCNC: 47 U/L
BILIRUB SERPL-MCNC: 0.5 MG/DL
BUN SERPL-MCNC: 12 MG/DL
CALCIUM SERPL-MCNC: 9.9 MG/DL
CHLORIDE SERPL-SCNC: 100 MMOL/L
CO2 SERPL-SCNC: 25 MMOL/L
CREAT SERPL-MCNC: 0.68 MG/DL
GLUCOSE SERPL-MCNC: 112 MG/DL
POTASSIUM SERPL-SCNC: 4.2 MMOL/L
PROT SERPL-MCNC: 7 G/DL
SODIUM SERPL-SCNC: 138 MMOL/L

## 2020-06-29 ENCOUNTER — APPOINTMENT (OUTPATIENT)
Dept: FAMILY MEDICINE | Facility: CLINIC | Age: 65
End: 2020-06-29

## 2020-10-14 ENCOUNTER — APPOINTMENT (OUTPATIENT)
Dept: FAMILY MEDICINE | Facility: CLINIC | Age: 65
End: 2020-10-14
Payer: MEDICARE

## 2020-10-14 VITALS
BODY MASS INDEX: 32.65 KG/M2 | RESPIRATION RATE: 14 BRPM | HEART RATE: 96 BPM | TEMPERATURE: 97.6 F | DIASTOLIC BLOOD PRESSURE: 84 MMHG | OXYGEN SATURATION: 97 % | SYSTOLIC BLOOD PRESSURE: 152 MMHG | HEIGHT: 67 IN | WEIGHT: 208 LBS

## 2020-10-14 DIAGNOSIS — R07.89 OTHER CHEST PAIN: ICD-10-CM

## 2020-10-14 LAB — HBA1C MFR BLD HPLC: 7.7

## 2020-10-14 PROCEDURE — G0008: CPT

## 2020-10-14 PROCEDURE — 99214 OFFICE O/P EST MOD 30 MIN: CPT | Mod: 25

## 2020-10-14 PROCEDURE — 90662 IIV NO PRSV INCREASED AG IM: CPT

## 2020-10-14 PROCEDURE — 83036 HEMOGLOBIN GLYCOSYLATED A1C: CPT | Mod: QW

## 2020-10-14 RX ORDER — CANAGLIFLOZIN 100 MG/1
100 TABLET, FILM COATED ORAL
Qty: 90 | Refills: 3 | Status: DISCONTINUED | COMMUNITY
Start: 2018-03-30 | End: 2020-10-14

## 2020-10-14 NOTE — ASSESSMENT
[FreeTextEntry1] : Patient with a PMHx of DM2, BPH, presents for f/u of diabetes management. He has not been monitoring his blood sugars at home. He states he has gained 20 pounds. He also reports worsening arthritis. He reports he has been drinking more beer lately, at least 12 beers on one night over the weekends. \par \par High dose Influenza vaccine offered and administered \par Labs ordered \par POCT A1C done - 7.7%\par Pt aware of his noncompliance and risk of death or major complications\par Need for possible insulin management discussed - pt aware\par RTC in 1 month with recorded fingersticks

## 2020-10-14 NOTE — PHYSICAL EXAM
[No Acute Distress] : no acute distress [Normal Sclera/Conjunctiva] : normal sclera/conjunctiva [Well Nourished] : well nourished [PERRL] : pupils equal round and reactive to light [EOMI] : extraocular movements intact [Normal Outer Ear/Nose] : the outer ears and nose were normal in appearance [Normal Oropharynx] : the oropharynx was normal [No JVD] : no jugular venous distention [No Lymphadenopathy] : no lymphadenopathy [Supple] : supple [Thyroid Normal, No Nodules] : the thyroid was normal and there were no nodules present [No Accessory Muscle Use] : no accessory muscle use [No Respiratory Distress] : no respiratory distress  [Clear to Auscultation] : lungs were clear to auscultation bilaterally [Normal Rate] : normal rate  [Regular Rhythm] : with a regular rhythm [Normal S1, S2] : normal S1 and S2 [No Murmur] : no murmur heard [No Carotid Bruits] : no carotid bruits [No Abdominal Bruit] : a ~M bruit was not heard ~T in the abdomen [No Varicosities] : no varicosities [No Edema] : there was no peripheral edema [Pedal Pulses Present] : the pedal pulses are present [No Palpable Aorta] : no palpable aorta [No Extremity Clubbing/Cyanosis] : no extremity clubbing/cyanosis [Soft] : abdomen soft [Non Tender] : non-tender [Non-distended] : non-distended [No Masses] : no abdominal mass palpated [No HSM] : no HSM [Normal Bowel Sounds] : normal bowel sounds [Normal Posterior Cervical Nodes] : no posterior cervical lymphadenopathy [Normal Anterior Cervical Nodes] : no anterior cervical lymphadenopathy [No CVA Tenderness] : no CVA  tenderness [No Spinal Tenderness] : no spinal tenderness [No Joint Swelling] : no joint swelling [Grossly Normal Strength/Tone] : grossly normal strength/tone [No Rash] : no rash [Coordination Grossly Intact] : coordination grossly intact [No Focal Deficits] : no focal deficits [Normal Gait] : normal gait [Normal Affect] : the affect was normal [Deep Tendon Reflexes (DTR)] : deep tendon reflexes were 2+ and symmetric [Normal] : affect was normal and insight and judgment were intact [Normal Insight/Judgement] : insight and judgment were intact [de-identified] : obese [de-identified] : ankle edema

## 2020-10-14 NOTE — REVIEW OF SYSTEMS
[Fatigue] : fatigue [Lower Ext Edema] : lower extremity edema [Paroxysmal Nocturnal Dyspnea] : paroxysmal nocturnal dyspnea [Back Pain] : back pain [Negative] : Heme/Lymph [Fever] : no fever [Night Sweats] : no night sweats [Earache] : no earache [Hearing Loss] : no hearing loss [Nosebleeds] : no nosebleeds [Postnasal Drip] : no postnasal drip [Nasal Discharge] : no nasal discharge [Sore Throat] : no sore throat [Hoarseness] : no hoarseness [Chest Pain] : no chest pain [Cough] : no cough [Palpitations] : no palpitations [Nausea] : no nausea [Heartburn] : no heartburn [Melena] : no melena [Hesitancy] : no hesitancy [Joint Pain] : no joint pain [Muscle Weakness] : no muscle weakness [Itching] : no itching [Hair Changes] : no hair changes [Mole Changes] : no mole changes [Insomnia] : no insomnia [Depression] : no depression

## 2020-10-14 NOTE — COUNSELING
[Use proper foot wear] : Use proper foot wear [Behavioral health counseling provided] : Behavioral health counseling provided [Fall prevention counseling provided] : Fall prevention counseling provided [Engage in a relaxing activity] : Engage in a relaxing activity [Good understanding] : Patient has a good understanding of lifestyle changes and steps needed to achieve self management goal [None] : None

## 2020-10-14 NOTE — HISTORY OF PRESENT ILLNESS
[FreeTextEntry1] : Patient with a PMHx of DM2, BPH, presents for f/u of diabetes management. He has not been monitoring his blood sugars at home. He states he has gained 20 pounds. He also reports worsening arthritis. He reports he has been drinking more beer lately, at least 12 beers on one night over the weekends.

## 2020-10-19 LAB
ALBUMIN SERPL ELPH-MCNC: 4.4 G/DL
ALP BLD-CCNC: 93 U/L
ALT SERPL-CCNC: 33 U/L
ANION GAP SERPL CALC-SCNC: 12 MMOL/L
APPEARANCE: CLEAR
AST SERPL-CCNC: 36 U/L
BASOPHILS # BLD AUTO: 0.04 K/UL
BASOPHILS NFR BLD AUTO: 0.5 %
BILIRUB SERPL-MCNC: 0.4 MG/DL
BILIRUBIN URINE: NEGATIVE
BLOOD URINE: NEGATIVE
BUN SERPL-MCNC: 12 MG/DL
CALCIUM SERPL-MCNC: 9.9 MG/DL
CHLORIDE SERPL-SCNC: 100 MMOL/L
CHOLEST SERPL-MCNC: 154 MG/DL
CHOLEST/HDLC SERPL: 3.2 RATIO
CO2 SERPL-SCNC: 26 MMOL/L
COLOR: COLORLESS
CREAT SERPL-MCNC: 0.69 MG/DL
EOSINOPHIL # BLD AUTO: 0.11 K/UL
EOSINOPHIL NFR BLD AUTO: 1.4 %
ERYTHROCYTE [SEDIMENTATION RATE] IN BLOOD BY WESTERGREN METHOD: 28 MM/HR
GLUCOSE QUALITATIVE U: NEGATIVE
GLUCOSE SERPL-MCNC: 139 MG/DL
HCT VFR BLD CALC: 43.9 %
HDLC SERPL-MCNC: 48 MG/DL
HGB BLD-MCNC: 14.4 G/DL
IMM GRANULOCYTES NFR BLD AUTO: 0.3 %
KETONES URINE: NEGATIVE
LDLC SERPL CALC-MCNC: 87 MG/DL
LEUKOCYTE ESTERASE URINE: NEGATIVE
LYMPHOCYTES # BLD AUTO: 1.96 K/UL
LYMPHOCYTES NFR BLD AUTO: 25.1 %
MAN DIFF?: NORMAL
MCHC RBC-ENTMCNC: 29.3 PG
MCHC RBC-ENTMCNC: 32.8 GM/DL
MCV RBC AUTO: 89.4 FL
MONOCYTES # BLD AUTO: 0.72 K/UL
MONOCYTES NFR BLD AUTO: 9.2 %
NEUTROPHILS # BLD AUTO: 4.96 K/UL
NEUTROPHILS NFR BLD AUTO: 63.5 %
NITRITE URINE: NEGATIVE
PH URINE: 5
PLATELET # BLD AUTO: 209 K/UL
POTASSIUM SERPL-SCNC: 4.2 MMOL/L
PROT SERPL-MCNC: 7.3 G/DL
PROTEIN URINE: NEGATIVE
RBC # BLD: 4.91 M/UL
RBC # FLD: 12.6 %
SODIUM SERPL-SCNC: 137 MMOL/L
SPECIFIC GRAVITY URINE: 1
TRIGL SERPL-MCNC: 96 MG/DL
UROBILINOGEN URINE: NORMAL
WBC # FLD AUTO: 7.81 K/UL

## 2020-10-22 LAB — TSH SERPL-ACNC: 1.98 UIU/ML

## 2020-11-23 ENCOUNTER — APPOINTMENT (OUTPATIENT)
Dept: FAMILY MEDICINE | Facility: CLINIC | Age: 65
End: 2020-11-23
Payer: MEDICARE

## 2020-11-23 VITALS
TEMPERATURE: 97.9 F | HEART RATE: 98 BPM | SYSTOLIC BLOOD PRESSURE: 140 MMHG | HEIGHT: 67 IN | BODY MASS INDEX: 32.65 KG/M2 | OXYGEN SATURATION: 98 % | DIASTOLIC BLOOD PRESSURE: 80 MMHG | RESPIRATION RATE: 14 BRPM | WEIGHT: 208 LBS

## 2020-11-23 LAB
BILIRUB UR QL STRIP: NORMAL
CLARITY UR: CLEAR
COLLECTION METHOD: NORMAL
GLUCOSE BLDC GLUCOMTR-MCNC: 98
GLUCOSE UR-MCNC: NORMAL
HBA1C MFR BLD HPLC: 6.7
HCG UR QL: 0.2 EU/DL
HGB UR QL STRIP.AUTO: NORMAL
KETONES UR-MCNC: NORMAL
LEUKOCYTE ESTERASE UR QL STRIP: NORMAL
NITRITE UR QL STRIP: NORMAL
PH UR STRIP: 6
PROT UR STRIP-MCNC: NORMAL
SP GR UR STRIP: <=1.005

## 2020-11-23 PROCEDURE — 82962 GLUCOSE BLOOD TEST: CPT

## 2020-11-23 PROCEDURE — 83036 HEMOGLOBIN GLYCOSYLATED A1C: CPT | Mod: QW

## 2020-11-23 PROCEDURE — 81003 URINALYSIS AUTO W/O SCOPE: CPT | Mod: QW

## 2020-11-23 PROCEDURE — 99214 OFFICE O/P EST MOD 30 MIN: CPT | Mod: 25

## 2020-11-23 NOTE — HISTORY OF PRESENT ILLNESS
[FreeTextEntry1] : Patient with a PMHx of DM2, BPH, presents for f/u of diabetes management. He has been checking his sugars, but the numbers fluctuate a lot. He states he has gained 20 pounds. He also reports worsening arthritis and a strange feeling for pressure in his right hand. He has recently cut down on drinking.

## 2020-11-23 NOTE — PHYSICAL EXAM
[No Acute Distress] : no acute distress [Well Nourished] : well nourished [Normal Sclera/Conjunctiva] : normal sclera/conjunctiva [PERRL] : pupils equal round and reactive to light [EOMI] : extraocular movements intact [Normal Outer Ear/Nose] : the outer ears and nose were normal in appearance [Normal Oropharynx] : the oropharynx was normal [No JVD] : no jugular venous distention [No Lymphadenopathy] : no lymphadenopathy [Supple] : supple [Thyroid Normal, No Nodules] : the thyroid was normal and there were no nodules present [No Respiratory Distress] : no respiratory distress  [No Accessory Muscle Use] : no accessory muscle use [Clear to Auscultation] : lungs were clear to auscultation bilaterally [Normal Rate] : normal rate  [Regular Rhythm] : with a regular rhythm [Normal S1, S2] : normal S1 and S2 [No Murmur] : no murmur heard [No Carotid Bruits] : no carotid bruits [No Abdominal Bruit] : a ~M bruit was not heard ~T in the abdomen [No Varicosities] : no varicosities [Pedal Pulses Present] : the pedal pulses are present [No Edema] : there was no peripheral edema [No Palpable Aorta] : no palpable aorta [No Extremity Clubbing/Cyanosis] : no extremity clubbing/cyanosis [Soft] : abdomen soft [Non Tender] : non-tender [Non-distended] : non-distended [No Masses] : no abdominal mass palpated [No HSM] : no HSM [Normal Bowel Sounds] : normal bowel sounds [Normal Posterior Cervical Nodes] : no posterior cervical lymphadenopathy [Normal Anterior Cervical Nodes] : no anterior cervical lymphadenopathy [No CVA Tenderness] : no CVA  tenderness [No Spinal Tenderness] : no spinal tenderness [No Joint Swelling] : no joint swelling [Grossly Normal Strength/Tone] : grossly normal strength/tone [No Rash] : no rash [Coordination Grossly Intact] : coordination grossly intact [No Focal Deficits] : no focal deficits [Normal Gait] : normal gait [Deep Tendon Reflexes (DTR)] : deep tendon reflexes were 2+ and symmetric [Normal Affect] : the affect was normal [Normal Insight/Judgement] : insight and judgment were intact [Normal] : affect was normal and insight and judgment were intact [de-identified] : obese [de-identified] : ankle edema

## 2020-11-23 NOTE — ASSESSMENT
[FreeTextEntry1] : Patient with a PMHx of DM2, BPH, presents for f/u of diabetes management. He has been checking his sugars, but the numbers fluctuate a lot. He states he has gained 20 pounds. He also reports worsening arthritis and a strange feeling for pressure in his right hand. He has recently cut down on drinking. \par \par \par Labs discussed\par POCT glucose 98 mg/dL in office today\par POCT A1C done - 7.7%\par Pt aware of his noncompliance and risk of death or major complications\par Need for possible insulin management discussed - pt aware\par RTC in 1 month with recorded fingersticks\par Ortho hand surgeon referral given for lipoma of right wrist

## 2020-11-23 NOTE — REVIEW OF SYSTEMS
[Fatigue] : fatigue [Lower Ext Edema] : lower extremity edema [Paroxysmal Nocturnal Dyspnea] : paroxysmal nocturnal dyspnea [Back Pain] : back pain [Negative] : Heme/Lymph [Fever] : no fever [Night Sweats] : no night sweats [Earache] : no earache [Hearing Loss] : no hearing loss [Nosebleeds] : no nosebleeds [Postnasal Drip] : no postnasal drip [Nasal Discharge] : no nasal discharge [Sore Throat] : no sore throat [Hoarseness] : no hoarseness [Chest Pain] : no chest pain [Palpitations] : no palpitations [Cough] : no cough [Nausea] : no nausea [Heartburn] : no heartburn [Melena] : no melena [Hesitancy] : no hesitancy [Joint Pain] : no joint pain [Muscle Weakness] : no muscle weakness [Itching] : no itching [Mole Changes] : no mole changes [Hair Changes] : no hair changes [Insomnia] : no insomnia [Depression] : no depression

## 2020-11-23 NOTE — HEALTH RISK ASSESSMENT
[Yes] : Yes [2 - 4 times a month (2 pts)] : 2-4 times a month (2 points) [3 or 4 (1 pt)] : 3 or 4  (1 point) [Never (0 pts)] : Never (0 points) [No] : In the past 12 months have you used drugs other than those required for medical reasons? No [No falls in past year] : Patient reported no falls in the past year [0] : 2) Feeling down, depressed, or hopeless: Not at all (0) [] : No [YearQuit] : 2012 [Audit-CScore] : 3 [[id number]] : 0

## 2020-12-08 ENCOUNTER — APPOINTMENT (OUTPATIENT)
Dept: FAMILY MEDICINE | Facility: CLINIC | Age: 65
End: 2020-12-08
Payer: MEDICARE

## 2020-12-08 VITALS
RESPIRATION RATE: 13 BRPM | HEART RATE: 80 BPM | HEIGHT: 67 IN | SYSTOLIC BLOOD PRESSURE: 132 MMHG | BODY MASS INDEX: 32.65 KG/M2 | WEIGHT: 208 LBS | DIASTOLIC BLOOD PRESSURE: 78 MMHG | OXYGEN SATURATION: 98 % | TEMPERATURE: 97.2 F

## 2020-12-08 DIAGNOSIS — Z87.898 PERSONAL HISTORY OF OTHER SPECIFIED CONDITIONS: ICD-10-CM

## 2020-12-08 DIAGNOSIS — Z86.018 PERSONAL HISTORY OF OTHER BENIGN NEOPLASM: ICD-10-CM

## 2020-12-08 LAB — GLUCOSE BLDC GLUCOMTR-MCNC: 136

## 2020-12-08 PROCEDURE — 99072 ADDL SUPL MATRL&STAF TM PHE: CPT

## 2020-12-08 PROCEDURE — 99214 OFFICE O/P EST MOD 30 MIN: CPT | Mod: 25

## 2020-12-08 PROCEDURE — 82962 GLUCOSE BLOOD TEST: CPT

## 2020-12-08 PROCEDURE — 36415 COLL VENOUS BLD VENIPUNCTURE: CPT

## 2020-12-08 NOTE — HISTORY OF PRESENT ILLNESS
[FreeTextEntry1] : Patient with a PMHx of DM2, BPH, chronic low back pain presents for f/u of diabetes management, discuss labs. He has been checking his sugars, but the numbers fluctuate a lot. He complains of chest pain for a few months, chronic issue before that too.  [de-identified] : Patient with a PMHx of DM2, BPH, chronic low back pain presents for f/u of diabetes management, discuss labs. He has been checking his sugars, but the numbers fluctuate a lot. He complains of chest pain for a few months, chronic issue before that too. Motrin, aleve taken for the chest pain which helps a little. Chest pain is associated with dyspnea as well. Hurts when he presses the chest. Patient has not seen a cardiologist in a few years.

## 2020-12-08 NOTE — PHYSICAL EXAM
[No Acute Distress] : no acute distress [Well Nourished] : well nourished [Normal Sclera/Conjunctiva] : normal sclera/conjunctiva [PERRL] : pupils equal round and reactive to light [EOMI] : extraocular movements intact [Normal Outer Ear/Nose] : the outer ears and nose were normal in appearance [Normal Oropharynx] : the oropharynx was normal [No JVD] : no jugular venous distention [No Lymphadenopathy] : no lymphadenopathy [Supple] : supple [Thyroid Normal, No Nodules] : the thyroid was normal and there were no nodules present [No Respiratory Distress] : no respiratory distress  [No Accessory Muscle Use] : no accessory muscle use [Clear to Auscultation] : lungs were clear to auscultation bilaterally [Normal Rate] : normal rate  [Regular Rhythm] : with a regular rhythm [Normal S1, S2] : normal S1 and S2 [No Murmur] : no murmur heard [No Carotid Bruits] : no carotid bruits [No Abdominal Bruit] : a ~M bruit was not heard ~T in the abdomen [No Varicosities] : no varicosities [Pedal Pulses Present] : the pedal pulses are present [No Edema] : there was no peripheral edema [No Palpable Aorta] : no palpable aorta [No Extremity Clubbing/Cyanosis] : no extremity clubbing/cyanosis [Soft] : abdomen soft [Non Tender] : non-tender [Non-distended] : non-distended [No Masses] : no abdominal mass palpated [No HSM] : no HSM [Normal Bowel Sounds] : normal bowel sounds [Normal Posterior Cervical Nodes] : no posterior cervical lymphadenopathy [Normal Anterior Cervical Nodes] : no anterior cervical lymphadenopathy [No CVA Tenderness] : no CVA  tenderness [No Spinal Tenderness] : no spinal tenderness [No Joint Swelling] : no joint swelling [Grossly Normal Strength/Tone] : grossly normal strength/tone [No Rash] : no rash [Coordination Grossly Intact] : coordination grossly intact [No Focal Deficits] : no focal deficits [Normal Gait] : normal gait [Deep Tendon Reflexes (DTR)] : deep tendon reflexes were 2+ and symmetric [Normal Affect] : the affect was normal [Normal Insight/Judgement] : insight and judgment were intact [Normal] : affect was normal and insight and judgment were intact [de-identified] : obese [de-identified] : Dyspnea is experienced with chest pain [de-identified] : Experiences chest pain at rest, chest wall tenderness elicited [de-identified] : ankle edema

## 2020-12-08 NOTE — REVIEW OF SYSTEMS
[Fatigue] : fatigue [Lower Ext Edema] : lower extremity edema [Paroxysmal Nocturnal Dyspnea] : paroxysmal nocturnal dyspnea [Back Pain] : back pain [Negative] : Heme/Lymph [Chest Pain] : chest pain [Shortness Of Breath] : shortness of breath [Nausea] : nausea [Diarrhea] : diarrhea [Heartburn] : heartburn [Fever] : no fever [Night Sweats] : no night sweats [Earache] : no earache [Hearing Loss] : no hearing loss [Nosebleeds] : no nosebleeds [Postnasal Drip] : no postnasal drip [Nasal Discharge] : no nasal discharge [Sore Throat] : no sore throat [Hoarseness] : no hoarseness [Palpitations] : no palpitations [Wheezing] : no wheezing [Cough] : no cough [Abdominal Pain] : no abdominal pain [Constipation] : no constipation [Vomiting] : no vomiting [Melena] : no melena [Hesitancy] : no hesitancy [Joint Pain] : no joint pain [Muscle Weakness] : no muscle weakness [Itching] : no itching [Mole Changes] : no mole changes [Hair Changes] : no hair changes [Insomnia] : no insomnia [Depression] : no depression [FreeTextEntry7] : Diarrhea for a couple of weeks

## 2020-12-08 NOTE — HEALTH RISK ASSESSMENT
[Yes] : Yes [2 - 4 times a month (2 pts)] : 2-4 times a month (2 points) [3 or 4 (1 pt)] : 3 or 4  (1 point) [Never (0 pts)] : Never (0 points) [No] : In the past 12 months have you used drugs other than those required for medical reasons? No [No falls in past year] : Patient reported no falls in the past year [0] : 2) Feeling down, depressed, or hopeless: Not at all (0) [] : No [YearQuit] : 2012 [Audit-CScore] : 3 [[id number]] : 0

## 2020-12-08 NOTE — ASSESSMENT
[FreeTextEntry1] : Patient with a PMHx of DM2, BPH, chronic low back pain presents for f/u of diabetes management, discuss labs. He has been checking his sugars, but the numbers fluctuate a lot. He complains of chest pain for a few months, chronic issue before that too. \par \par Labs discussed\par POCT glucose 136 mg/dL in office today\par POCT A1C done - 6.7% last visit\par Pt aware of his noncompliance and risk of death or major complications\par Need for possible insulin management discussed - pt aware\par Pt is given indomethacin for chest wall tenderness, CXR to be completed. \par COVID nasal swab completed.\par RTC in 1 month with recorded fingersticks\par Ortho hand surgeon referral given for lipoma of right wrist

## 2020-12-15 ENCOUNTER — NON-APPOINTMENT (OUTPATIENT)
Age: 65
End: 2020-12-15

## 2020-12-15 LAB — SARS-COV-2 N GENE NPH QL NAA+PROBE: NOT DETECTED

## 2020-12-16 ENCOUNTER — APPOINTMENT (OUTPATIENT)
Dept: ORTHOPEDIC SURGERY | Facility: CLINIC | Age: 65
End: 2020-12-16
Payer: MEDICARE

## 2020-12-16 VITALS
HEIGHT: 67 IN | BODY MASS INDEX: 32.65 KG/M2 | WEIGHT: 208 LBS | HEART RATE: 104 BPM | SYSTOLIC BLOOD PRESSURE: 187 MMHG | DIASTOLIC BLOOD PRESSURE: 108 MMHG

## 2020-12-16 VITALS — TEMPERATURE: 96 F

## 2020-12-16 PROCEDURE — 99072 ADDL SUPL MATRL&STAF TM PHE: CPT

## 2020-12-16 PROCEDURE — 99204 OFFICE O/P NEW MOD 45 MIN: CPT

## 2020-12-16 PROCEDURE — 73140 X-RAY EXAM OF FINGER(S): CPT | Mod: F7

## 2020-12-28 ENCOUNTER — APPOINTMENT (OUTPATIENT)
Dept: ULTRASOUND IMAGING | Facility: CLINIC | Age: 65
End: 2020-12-28
Payer: MEDICARE

## 2020-12-28 ENCOUNTER — OUTPATIENT (OUTPATIENT)
Dept: OUTPATIENT SERVICES | Facility: HOSPITAL | Age: 65
LOS: 1 days | End: 2020-12-28

## 2020-12-28 DIAGNOSIS — R22.31 LOCALIZED SWELLING, MASS AND LUMP, RIGHT UPPER LIMB: ICD-10-CM

## 2020-12-28 PROCEDURE — 76882 US LMTD JT/FCL EVL NVASC XTR: CPT | Mod: 26,RT

## 2021-01-07 ENCOUNTER — APPOINTMENT (OUTPATIENT)
Dept: FAMILY MEDICINE | Facility: CLINIC | Age: 66
End: 2021-01-07
Payer: MEDICARE

## 2021-01-07 ENCOUNTER — RESULT CHARGE (OUTPATIENT)
Age: 66
End: 2021-01-07

## 2021-01-07 VITALS
WEIGHT: 211 LBS | RESPIRATION RATE: 14 BRPM | OXYGEN SATURATION: 98 % | HEART RATE: 105 BPM | TEMPERATURE: 98 F | BODY MASS INDEX: 33.12 KG/M2 | SYSTOLIC BLOOD PRESSURE: 140 MMHG | DIASTOLIC BLOOD PRESSURE: 88 MMHG | HEIGHT: 67 IN

## 2021-01-07 DIAGNOSIS — R07.89 OTHER CHEST PAIN: ICD-10-CM

## 2021-01-07 DIAGNOSIS — R22.31 LOCALIZED SWELLING, MASS AND LUMP, RIGHT UPPER LIMB: ICD-10-CM

## 2021-01-07 DIAGNOSIS — M79.644 PAIN IN RIGHT FINGER(S): ICD-10-CM

## 2021-01-07 LAB — HBA1C MFR BLD HPLC: 8.3

## 2021-01-07 PROCEDURE — 99214 OFFICE O/P EST MOD 30 MIN: CPT | Mod: 25

## 2021-01-07 PROCEDURE — 99072 ADDL SUPL MATRL&STAF TM PHE: CPT

## 2021-01-07 PROCEDURE — 36415 COLL VENOUS BLD VENIPUNCTURE: CPT

## 2021-01-07 RX ORDER — INDOMETHACIN 50 MG/1
50 CAPSULE ORAL 3 TIMES DAILY
Qty: 30 | Refills: 0 | Status: DISCONTINUED | COMMUNITY
Start: 2020-12-08 | End: 2021-01-07

## 2021-01-07 NOTE — HISTORY OF PRESENT ILLNESS
[FreeTextEntry1] : Patient with a PMHx of DM2, BPH, chronic low back pain presents for f/u of diabetes management, discuss labs. has not been on diet having foot burning in each side

## 2021-01-07 NOTE — HEALTH RISK ASSESSMENT
[Yes] : Yes [2 - 4 times a month (2 pts)] : 2-4 times a month (2 points) [3 or 4 (1 pt)] : 3 or 4  (1 point) [Never (0 pts)] : Never (0 points) [No] : In the past 12 months have you used drugs other than those required for medical reasons? No [No falls in past year] : Patient reported no falls in the past year [0] : 2) Feeling down, depressed, or hopeless: Not at all (0) [] : No [YearQuit] : 2012 [Audit-CScore] : 3 [[id number]] : 0

## 2021-01-07 NOTE — PHYSICAL EXAM
[No Acute Distress] : no acute distress [Well Nourished] : well nourished [Normal Sclera/Conjunctiva] : normal sclera/conjunctiva [PERRL] : pupils equal round and reactive to light [EOMI] : extraocular movements intact [Normal Outer Ear/Nose] : the outer ears and nose were normal in appearance [Normal Oropharynx] : the oropharynx was normal [No JVD] : no jugular venous distention [No Lymphadenopathy] : no lymphadenopathy [Supple] : supple [Thyroid Normal, No Nodules] : the thyroid was normal and there were no nodules present [No Respiratory Distress] : no respiratory distress  [No Accessory Muscle Use] : no accessory muscle use [Clear to Auscultation] : lungs were clear to auscultation bilaterally [Normal Rate] : normal rate  [Regular Rhythm] : with a regular rhythm [Normal S1, S2] : normal S1 and S2 [No Murmur] : no murmur heard [No Carotid Bruits] : no carotid bruits [No Abdominal Bruit] : a ~M bruit was not heard ~T in the abdomen [No Varicosities] : no varicosities [Pedal Pulses Present] : the pedal pulses are present [No Edema] : there was no peripheral edema [No Palpable Aorta] : no palpable aorta [No Extremity Clubbing/Cyanosis] : no extremity clubbing/cyanosis [Soft] : abdomen soft [Non Tender] : non-tender [Non-distended] : non-distended [No Masses] : no abdominal mass palpated [No HSM] : no HSM [Normal Bowel Sounds] : normal bowel sounds [Normal Posterior Cervical Nodes] : no posterior cervical lymphadenopathy [Normal Anterior Cervical Nodes] : no anterior cervical lymphadenopathy [No CVA Tenderness] : no CVA  tenderness [No Spinal Tenderness] : no spinal tenderness [No Joint Swelling] : no joint swelling [Grossly Normal Strength/Tone] : grossly normal strength/tone [No Rash] : no rash [Coordination Grossly Intact] : coordination grossly intact [No Focal Deficits] : no focal deficits [Normal Gait] : normal gait [Deep Tendon Reflexes (DTR)] : deep tendon reflexes were 2+ and symmetric [Normal Affect] : the affect was normal [Normal Insight/Judgement] : insight and judgment were intact [Normal] : affect was normal and insight and judgment were intact [de-identified] : obese [de-identified] : Dyspnea is experienced with chest pain [de-identified] : Experiences chest pain at rest, chest wall tenderness elicited [de-identified] : ankle edema

## 2021-01-07 NOTE — REVIEW OF SYSTEMS
[Fatigue] : fatigue [Chest Pain] : chest pain [Lower Ext Edema] : lower extremity edema [Paroxysmal Nocturnal Dyspnea] : paroxysmal nocturnal dyspnea [Shortness Of Breath] : shortness of breath [Nausea] : nausea [Diarrhea] : diarrhea [Heartburn] : heartburn [Back Pain] : back pain [Negative] : Heme/Lymph [Fever] : no fever [Night Sweats] : no night sweats [Earache] : no earache [Hearing Loss] : no hearing loss [Nosebleeds] : no nosebleeds [Postnasal Drip] : no postnasal drip [Nasal Discharge] : no nasal discharge [Sore Throat] : no sore throat [Hoarseness] : no hoarseness [Palpitations] : no palpitations [Wheezing] : no wheezing [Cough] : no cough [Abdominal Pain] : no abdominal pain [Constipation] : no constipation [Vomiting] : no vomiting [Melena] : no melena [Hesitancy] : no hesitancy [Joint Pain] : no joint pain [Muscle Weakness] : no muscle weakness [Itching] : no itching [Mole Changes] : no mole changes [Hair Changes] : no hair changes [Insomnia] : no insomnia [Depression] : no depression [FreeTextEntry7] : Diarrhea for a couple of weeks

## 2021-01-07 NOTE — ASSESSMENT
[FreeTextEntry1] : Patient with a PMHx of DM2, BPH, chronic low back pain presents for f/u of diabetes management, discuss labs. He has been checking his sugars, but the numbers fluctuate a lot. He complains of chest pain for a few months, chronic issue before that too. \par \par Labs discussed\par \par POCT A1C done - 8.7% last visit\par Pt aware of his noncompliance and risk of death or major complications\par Need for possible insulin management discussed - pt aware and declines \par \par had missed meds \par RTC in 1 month with recorded fingersticks\par

## 2021-01-08 LAB
ALBUMIN SERPL ELPH-MCNC: 4.4 G/DL
ALP BLD-CCNC: 100 U/L
ALT SERPL-CCNC: 41 U/L
ANION GAP SERPL CALC-SCNC: 14 MMOL/L
APPEARANCE: CLEAR
AST SERPL-CCNC: 46 U/L
BASOPHILS # BLD AUTO: 0.05 K/UL
BASOPHILS NFR BLD AUTO: 0.5 %
BILIRUB SERPL-MCNC: 0.5 MG/DL
BILIRUBIN URINE: NEGATIVE
BLOOD URINE: NEGATIVE
BUN SERPL-MCNC: 13 MG/DL
CALCIUM SERPL-MCNC: 9.6 MG/DL
CHLORIDE SERPL-SCNC: 100 MMOL/L
CHOLEST SERPL-MCNC: 168 MG/DL
CO2 SERPL-SCNC: 24 MMOL/L
COLOR: NORMAL
CREAT SERPL-MCNC: 0.81 MG/DL
EOSINOPHIL # BLD AUTO: 0.16 K/UL
EOSINOPHIL NFR BLD AUTO: 1.7 %
ERYTHROCYTE [SEDIMENTATION RATE] IN BLOOD BY WESTERGREN METHOD: 38 MM/HR
GLUCOSE QUALITATIVE U: NEGATIVE
GLUCOSE SERPL-MCNC: 165 MG/DL
HCT VFR BLD CALC: 43.7 %
HDLC SERPL-MCNC: 43 MG/DL
HGB BLD-MCNC: 14.2 G/DL
IMM GRANULOCYTES NFR BLD AUTO: 0.2 %
KETONES URINE: NEGATIVE
LDLC SERPL CALC-MCNC: 101 MG/DL
LEUKOCYTE ESTERASE URINE: NEGATIVE
LYMPHOCYTES # BLD AUTO: 2.32 K/UL
LYMPHOCYTES NFR BLD AUTO: 25.1 %
MAN DIFF?: NORMAL
MCHC RBC-ENTMCNC: 28.3 PG
MCHC RBC-ENTMCNC: 32.5 GM/DL
MCV RBC AUTO: 87.2 FL
MONOCYTES # BLD AUTO: 0.78 K/UL
MONOCYTES NFR BLD AUTO: 8.5 %
NEUTROPHILS # BLD AUTO: 5.9 K/UL
NEUTROPHILS NFR BLD AUTO: 64 %
NITRITE URINE: NEGATIVE
NONHDLC SERPL-MCNC: 125 MG/DL
PH URINE: 5.5
PLATELET # BLD AUTO: 231 K/UL
POTASSIUM SERPL-SCNC: 4 MMOL/L
PROT SERPL-MCNC: 7.7 G/DL
PROTEIN URINE: NEGATIVE
RBC # BLD: 5.01 M/UL
RBC # FLD: 13 %
SODIUM SERPL-SCNC: 139 MMOL/L
SPECIFIC GRAVITY URINE: 1.01
TRIGL SERPL-MCNC: 123 MG/DL
TSH SERPL-ACNC: 2.22 UIU/ML
UROBILINOGEN URINE: NORMAL
WBC # FLD AUTO: 9.23 K/UL

## 2021-01-25 ENCOUNTER — APPOINTMENT (OUTPATIENT)
Dept: NEUROLOGY | Facility: CLINIC | Age: 66
End: 2021-01-25
Payer: MEDICARE

## 2021-01-25 PROCEDURE — 95909 NRV CNDJ TST 5-6 STUDIES: CPT

## 2021-01-25 PROCEDURE — 99072 ADDL SUPL MATRL&STAF TM PHE: CPT

## 2021-01-25 PROCEDURE — 95886 MUSC TEST DONE W/N TEST COMP: CPT

## 2021-03-09 ENCOUNTER — APPOINTMENT (OUTPATIENT)
Dept: FAMILY MEDICINE | Facility: CLINIC | Age: 66
End: 2021-03-09
Payer: MEDICARE

## 2021-03-09 VITALS
WEIGHT: 205 LBS | HEART RATE: 108 BPM | SYSTOLIC BLOOD PRESSURE: 134 MMHG | BODY MASS INDEX: 32.18 KG/M2 | OXYGEN SATURATION: 98 % | HEIGHT: 67 IN | DIASTOLIC BLOOD PRESSURE: 78 MMHG | TEMPERATURE: 97.2 F | RESPIRATION RATE: 16 BRPM

## 2021-03-09 LAB — GLUCOSE BLDC GLUCOMTR-MCNC: 155

## 2021-03-09 PROCEDURE — 83036 HEMOGLOBIN GLYCOSYLATED A1C: CPT | Mod: QW

## 2021-03-09 PROCEDURE — 99072 ADDL SUPL MATRL&STAF TM PHE: CPT

## 2021-03-09 PROCEDURE — 99214 OFFICE O/P EST MOD 30 MIN: CPT | Mod: 25

## 2021-03-09 NOTE — HEALTH RISK ASSESSMENT
[No] : In the past 12 months have you used drugs other than those required for medical reasons? No [No falls in past year] : Patient reported no falls in the past year [0] : 2) Feeling down, depressed, or hopeless: Not at all (0) [] : No [de-identified] : Former [YearQuit] : 2012 [[id number]] : 0

## 2021-03-09 NOTE — COUNSELING
[Use proper foot wear] : Use proper foot wear [Use recommended devices] : Use recommended devices [Engage in a relaxing activity] : Engage in a relaxing activity [AUDIT-C Screening administered and reviewed] : AUDIT-C Screening administered and reviewed [Encouraged to increase physical activity] : Encouraged to increase physical activity [None] : None [Good understanding] : Patient has a good understanding of lifestyle changes and steps needed to achieve self management goal [FreeTextEntry2] : Former

## 2021-03-09 NOTE — HISTORY OF PRESENT ILLNESS
[FreeTextEntry1] : Patient with a PMHx of DM2, BPH, chronic low back pain presents for f/u of diabetes management, discuss labs. has not been on diet having foot burning in each side. Patients sister runs  and he was exposed to COVID 2 weeks ago. \par

## 2021-03-09 NOTE — ASSESSMENT
[FreeTextEntry1] : Patient with a PMHx of DM2, BPH, chronic low back pain presents for f/u of diabetes management, discuss labs. has not been on diet having foot burning in each side. Patients sister runs  and he was exposed to COVID. \par \par \par Patient care plan discussed\par diet and weight loss discussed \par Meds done\par Labs next visit - 1 month \par COVID PCR done \par Fingerstick glucose 155

## 2021-03-09 NOTE — PHYSICAL EXAM
[Well Nourished] : well nourished [Normal Sclera/Conjunctiva] : normal sclera/conjunctiva [PERRL] : pupils equal round and reactive to light [EOMI] : extraocular movements intact [Normal Outer Ear/Nose] : the outer ears and nose were normal in appearance [Normal Oropharynx] : the oropharynx was normal [No JVD] : no jugular venous distention [No Lymphadenopathy] : no lymphadenopathy [Supple] : supple [Thyroid Normal, No Nodules] : the thyroid was normal and there were no nodules present [No Respiratory Distress] : no respiratory distress  [No Accessory Muscle Use] : no accessory muscle use [Clear to Auscultation] : lungs were clear to auscultation bilaterally [Normal Rate] : normal rate  [Regular Rhythm] : with a regular rhythm [Normal S1, S2] : normal S1 and S2 [No Murmur] : no murmur heard [No Carotid Bruits] : no carotid bruits [No Abdominal Bruit] : a ~M bruit was not heard ~T in the abdomen [No Varicosities] : no varicosities [Pedal Pulses Present] : the pedal pulses are present [No Edema] : there was no peripheral edema [No Palpable Aorta] : no palpable aorta [No Extremity Clubbing/Cyanosis] : no extremity clubbing/cyanosis [Soft] : abdomen soft [Non Tender] : non-tender [Non-distended] : non-distended [No Masses] : no abdominal mass palpated [No HSM] : no HSM [Normal Bowel Sounds] : normal bowel sounds [Normal Posterior Cervical Nodes] : no posterior cervical lymphadenopathy [Normal Anterior Cervical Nodes] : no anterior cervical lymphadenopathy [No CVA Tenderness] : no CVA  tenderness [No Spinal Tenderness] : no spinal tenderness [No Joint Swelling] : no joint swelling [Grossly Normal Strength/Tone] : grossly normal strength/tone [No Rash] : no rash [Coordination Grossly Intact] : coordination grossly intact [No Focal Deficits] : no focal deficits [Normal Gait] : normal gait [Deep Tendon Reflexes (DTR)] : deep tendon reflexes were 2+ and symmetric [Normal Affect] : the affect was normal [Normal Insight/Judgement] : insight and judgment were intact [Normal] : affect was normal and insight and judgment were intact [de-identified] : obese [de-identified] : Dyspnea is experienced with chest pain [de-identified] : Experiences chest pain at rest, chest wall tenderness elicited [de-identified] : ankle edema

## 2021-03-11 LAB — SARS-COV-2 N GENE NPH QL NAA+PROBE: NOT DETECTED

## 2021-04-29 ENCOUNTER — INPATIENT (INPATIENT)
Facility: HOSPITAL | Age: 66
LOS: 1 days | Discharge: HOME | DRG: 246 | End: 2021-05-01
Attending: HOSPITALIST | Admitting: HOSPITALIST
Payer: COMMERCIAL

## 2021-04-29 VITALS
DIASTOLIC BLOOD PRESSURE: 86 MMHG | TEMPERATURE: 98 F | RESPIRATION RATE: 18 BRPM | HEART RATE: 79 BPM | SYSTOLIC BLOOD PRESSURE: 155 MMHG | OXYGEN SATURATION: 98 %

## 2021-04-29 DIAGNOSIS — R07.9 CHEST PAIN, UNSPECIFIED: ICD-10-CM

## 2021-04-29 LAB
ALBUMIN SERPL ELPH-MCNC: 4.1 G/DL — SIGNIFICANT CHANGE UP (ref 3.3–5.2)
ALP SERPL-CCNC: 110 U/L — SIGNIFICANT CHANGE UP (ref 40–120)
ALT FLD-CCNC: 43 U/L — HIGH
ANION GAP SERPL CALC-SCNC: 12 MMOL/L — SIGNIFICANT CHANGE UP (ref 5–17)
AST SERPL-CCNC: 49 U/L — HIGH
BASOPHILS # BLD AUTO: 0.03 K/UL — SIGNIFICANT CHANGE UP (ref 0–0.2)
BASOPHILS NFR BLD AUTO: 0.5 % — SIGNIFICANT CHANGE UP (ref 0–2)
BILIRUB SERPL-MCNC: 0.4 MG/DL — SIGNIFICANT CHANGE UP (ref 0.4–2)
BUN SERPL-MCNC: 9 MG/DL — SIGNIFICANT CHANGE UP (ref 8–20)
CALCIUM SERPL-MCNC: 9.5 MG/DL — SIGNIFICANT CHANGE UP (ref 8.6–10.2)
CHLORIDE SERPL-SCNC: 100 MMOL/L — SIGNIFICANT CHANGE UP (ref 98–107)
CO2 SERPL-SCNC: 27 MMOL/L — SIGNIFICANT CHANGE UP (ref 22–29)
CREAT SERPL-MCNC: 0.58 MG/DL — SIGNIFICANT CHANGE UP (ref 0.5–1.3)
EOSINOPHIL # BLD AUTO: 0.17 K/UL — SIGNIFICANT CHANGE UP (ref 0–0.5)
EOSINOPHIL NFR BLD AUTO: 2.6 % — SIGNIFICANT CHANGE UP (ref 0–6)
GLUCOSE SERPL-MCNC: 202 MG/DL — HIGH (ref 70–99)
HCT VFR BLD CALC: 44.7 % — SIGNIFICANT CHANGE UP (ref 39–50)
HGB BLD-MCNC: 14.9 G/DL — SIGNIFICANT CHANGE UP (ref 13–17)
IMM GRANULOCYTES NFR BLD AUTO: 0.3 % — SIGNIFICANT CHANGE UP (ref 0–1.5)
LYMPHOCYTES # BLD AUTO: 1.58 K/UL — SIGNIFICANT CHANGE UP (ref 1–3.3)
LYMPHOCYTES # BLD AUTO: 23.7 % — SIGNIFICANT CHANGE UP (ref 13–44)
MCHC RBC-ENTMCNC: 27.8 PG — SIGNIFICANT CHANGE UP (ref 27–34)
MCHC RBC-ENTMCNC: 33.3 GM/DL — SIGNIFICANT CHANGE UP (ref 32–36)
MCV RBC AUTO: 83.4 FL — SIGNIFICANT CHANGE UP (ref 80–100)
MONOCYTES # BLD AUTO: 0.6 K/UL — SIGNIFICANT CHANGE UP (ref 0–0.9)
MONOCYTES NFR BLD AUTO: 9 % — SIGNIFICANT CHANGE UP (ref 2–14)
NEUTROPHILS # BLD AUTO: 4.26 K/UL — SIGNIFICANT CHANGE UP (ref 1.8–7.4)
NEUTROPHILS NFR BLD AUTO: 63.9 % — SIGNIFICANT CHANGE UP (ref 43–77)
PLATELET # BLD AUTO: 197 K/UL — SIGNIFICANT CHANGE UP (ref 150–400)
POTASSIUM SERPL-MCNC: 4.1 MMOL/L — SIGNIFICANT CHANGE UP (ref 3.5–5.3)
POTASSIUM SERPL-SCNC: 4.1 MMOL/L — SIGNIFICANT CHANGE UP (ref 3.5–5.3)
PROT SERPL-MCNC: 7.6 G/DL — SIGNIFICANT CHANGE UP (ref 6.6–8.7)
RBC # BLD: 5.36 M/UL — SIGNIFICANT CHANGE UP (ref 4.2–5.8)
RBC # FLD: 12.2 % — SIGNIFICANT CHANGE UP (ref 10.3–14.5)
SODIUM SERPL-SCNC: 139 MMOL/L — SIGNIFICANT CHANGE UP (ref 135–145)
TROPONIN T SERPL-MCNC: <0.01 NG/ML — SIGNIFICANT CHANGE UP (ref 0–0.06)
TROPONIN T SERPL-MCNC: <0.01 NG/ML — SIGNIFICANT CHANGE UP (ref 0–0.06)
WBC # BLD: 6.66 K/UL — SIGNIFICANT CHANGE UP (ref 3.8–10.5)
WBC # FLD AUTO: 6.66 K/UL — SIGNIFICANT CHANGE UP (ref 3.8–10.5)

## 2021-04-29 PROCEDURE — 75571 CT HRT W/O DYE W/CA TEST: CPT | Mod: 26,MA

## 2021-04-29 PROCEDURE — 99285 EMERGENCY DEPT VISIT HI MDM: CPT | Mod: 25

## 2021-04-29 PROCEDURE — 99223 1ST HOSP IP/OBS HIGH 75: CPT

## 2021-04-29 PROCEDURE — 71046 X-RAY EXAM CHEST 2 VIEWS: CPT | Mod: 26

## 2021-04-29 RX ORDER — ASPIRIN/CALCIUM CARB/MAGNESIUM 324 MG
325 TABLET ORAL ONCE
Refills: 0 | Status: COMPLETED | OUTPATIENT
Start: 2021-04-29 | End: 2021-04-29

## 2021-04-29 RX ORDER — DEXTROSE 50 % IN WATER 50 %
25 SYRINGE (ML) INTRAVENOUS ONCE
Refills: 0 | Status: DISCONTINUED | OUTPATIENT
Start: 2021-04-29 | End: 2021-05-01

## 2021-04-29 RX ORDER — ASPIRIN/CALCIUM CARB/MAGNESIUM 324 MG
1 TABLET ORAL
Qty: 0 | Refills: 0 | DISCHARGE

## 2021-04-29 RX ORDER — DEXTROSE 50 % IN WATER 50 %
12.5 SYRINGE (ML) INTRAVENOUS ONCE
Refills: 0 | Status: DISCONTINUED | OUTPATIENT
Start: 2021-04-29 | End: 2021-05-01

## 2021-04-29 RX ORDER — PIOGLITAZONE HYDROCHLORIDE 15 MG/1
1 TABLET ORAL
Qty: 0 | Refills: 0 | DISCHARGE

## 2021-04-29 RX ORDER — GLUCAGON INJECTION, SOLUTION 0.5 MG/.1ML
1 INJECTION, SOLUTION SUBCUTANEOUS ONCE
Refills: 0 | Status: DISCONTINUED | OUTPATIENT
Start: 2021-04-29 | End: 2021-05-01

## 2021-04-29 RX ORDER — SODIUM CHLORIDE 9 MG/ML
1000 INJECTION, SOLUTION INTRAVENOUS
Refills: 0 | Status: DISCONTINUED | OUTPATIENT
Start: 2021-04-29 | End: 2021-05-01

## 2021-04-29 RX ORDER — DEXTROSE 50 % IN WATER 50 %
15 SYRINGE (ML) INTRAVENOUS ONCE
Refills: 0 | Status: DISCONTINUED | OUTPATIENT
Start: 2021-04-29 | End: 2021-05-01

## 2021-04-29 RX ORDER — SITAGLIPTIN 50 MG/1
1 TABLET, FILM COATED ORAL
Qty: 0 | Refills: 0 | DISCHARGE

## 2021-04-29 RX ORDER — INSULIN LISPRO 100/ML
VIAL (ML) SUBCUTANEOUS
Refills: 0 | Status: DISCONTINUED | OUTPATIENT
Start: 2021-04-29 | End: 2021-05-01

## 2021-04-29 RX ORDER — METOPROLOL TARTRATE 50 MG
100 TABLET ORAL ONCE
Refills: 0 | Status: COMPLETED | OUTPATIENT
Start: 2021-04-29 | End: 2021-04-29

## 2021-04-29 RX ORDER — AMLODIPINE BESYLATE 2.5 MG/1
5 TABLET ORAL DAILY
Refills: 0 | Status: DISCONTINUED | OUTPATIENT
Start: 2021-04-29 | End: 2021-05-01

## 2021-04-29 RX ADMIN — AMLODIPINE BESYLATE 5 MILLIGRAM(S): 2.5 TABLET ORAL at 14:13

## 2021-04-29 RX ADMIN — Medication 325 MILLIGRAM(S): at 09:45

## 2021-04-29 RX ADMIN — Medication 4: at 17:27

## 2021-04-29 RX ADMIN — Medication 100 MILLIGRAM(S): at 10:03

## 2021-04-29 NOTE — ED PROVIDER NOTE - CLINICAL SUMMARY MEDICAL DECISION MAKING FREE TEXT BOX
Patient with chest pain x2 months. Will evaluate with labwork, cxr, ekg. Patient with chest pain x2 months. Will evaluate with labwork, cxr, ekg. Patient CT calcium showing focal disease. As per cardiology, patient to be stented tomorrow.

## 2021-04-29 NOTE — ED PROVIDER NOTE - ATTENDING CONTRIBUTION TO CARE
The patient seen and examined    Chest Pain    I, Sam Mazariegos, performed the initial face to face bedside interview with this patient regarding history of present illness, review of symptoms and relevant past medical, social and family history.  I completed an independent physical examination.  I was the initial provider who evaluated this patient. I have signed out the follow up of any pending tests (i.e. labs, radiological studies) to the resident.  I have communicated the patient’s plan of care and disposition with the resident.

## 2021-04-29 NOTE — ED ADULT TRIAGE NOTE - MEANS OF ARRIVAL
ambulatory
How Severe Are Your Spot(S)?: mild
Have Your Spot(S) Been Treated In The Past?: has not been treated
Hpi Title: Evaluation of Skin Lesions

## 2021-04-29 NOTE — CONSULT NOTE ADULT - SUBJECTIVE AND OBJECTIVE BOX
[place] [facility]                                                               Office:  [address] [address]                                                              Telephone: [phone]. Fax:[phone]                                                                        CARDIOLOGY CONSULTATION NOTE                                                                                             Consult requested by:  Dr. Holman  Reason for Consultation: Chest Pain  History obtained by: Patient and medical record   obtained: No    Chief complaint:    Patient is a 65y old  Male who presents with a chief complaint of chest pain      HPI: Pt is a 66 y/o male with medical history of DM, who presents to [facility]ED for chest pain. Pt states that he has been experiencing chest pain over the past month. Pt describes chest pain as sharp (sometimes pressure), constant, left sided radiating down left arm and left leg. Pain is worse with deep inspiration, and with certain movements. Pain also effects ROM in left hand and wrist. Pt took OTC Tylenol and Motrin which minimally relieved pain. In ED, Trop#1-negative, ECG-NSR HR @ 81, XRAY-No acute radiographic cardiopulmonary pathology.           REVIEW OF SYMPTOMS:     CONSTITUTIONAL: No fever, weight loss, or fatigue  ENMT:  No difficulty hearing, tinnitus, vertigo; No sinus or throat pain  NECK: No pain or stiffness  CARDIOVASCULAR: See HPI  RESPIRATORY: No Dyspnea on exertion, Shortness of breath, cough, wheezing  : No dysuria, no hematuria   GI: No dark color stool, no melena, no diarrhea, no constipation, no abdominal pain   NEURO: No headache, no dizziness, no slurred speech   MUSCULOSKELETAL: No joint pain or swelling; No muscle, back, or extremity pain  PSYCH: No agitation, no anxiety.    ALL OTHER REVIEW OF SYSTEMS ARE NEGATIVE.      ALLERGIES: Allergies    No Known Allergies    Intolerances      PAST MEDICAL HISTORY  No pertinent past medical history    DM (diabetes mellitus)        PAST SURGICAL HISTORY  No significant past surgical history        FAMILY HISTORY:      SOCIAL HISTORY:    CIGARETTES:  quit 10 years ago, smoked since age 15   ALCOHOL: Occasional  DRUGS: Denies      CURRENT MEDICATIONS:       HOME MEDICATIONS:  Pioglitazone 15mg  Metformin 1000mg  Januvia 100mg      Vital Signs Last 24 Hrs  T(C): 36.7 (29 Apr 2021 07:37), Max: 36.7 (29 Apr 2021 07:37)  T(F): 98.1 (29 Apr 2021 07:37), Max: 98.1 (29 Apr 2021 07:37)  HR: 70 (29 Apr 2021 11:47) (67 - 79)  BP: 165/81 (29 Apr 2021 11:47) (155/78 - 165/81)  RR: 18 (29 Apr 2021 07:37) (18 - 18)  SpO2: 98% (29 Apr 2021 07:37) (98% - 98%)      PHYSICAL EXAM:  Constitutional: Comfortable . No acute distress.   HEENT: Atraumatic and normocephalic , neck is supple . no JVD. No carotid bruit. PEERL   CNS: A&Ox3. No focal deficits. EOMI.    Lymph Nodes: Cervical : Not palpable.  Respiratory: CTAB  Cardiovascular: S1S2 RRR. No murmur/rubs or gallop  Gastrointestinal: Soft non-tender and non distended . +Bowel sounds. negative El's sign  Extremities: No edema  Psychiatric: Calm . no agitation.  M/S: +chest pain upon palpation, limited ROM left wrist  Skin: No skin rash/ulcers visualized to face, hands or feet    Intake and output:     LABS:                        14.9   6.66  )-----------( 197      ( 29 Apr 2021 08:39 )             44.7     04-29    139  |  100  |  9.0  ----------------------------<  202<H>  4.1   |  27.0  |  0.58    Ca    9.5      29 Apr 2021 08:39    TPro  7.6  /  Alb  4.1  /  TBili  0.4  /  DBili  x   /  AST  49<H>  /  ALT  43<H>  /  AlkPhos  110  04-29    CARDIAC MARKERS ( 29 Apr 2021 08:39 )  x     / <0.01 ng/mL / x     / x     / x        ;p-BNP=        INTERPRETATION OF TELEMETRY: NSR HR @ 60-80s PACs  ECG: NSR HR @ 81    RADIOLOGY & ADDITIONAL STUDIES:      X-ray: < from: Xray Chest 2 Views PA/Lat (05.07.20 @ 12:43) >  IMPRESSION:    No acute radiographic cardiopulmonary pathology.

## 2021-04-29 NOTE — H&P ADULT - NSHPPHYSICALEXAM_GEN_ALL_CORE
Vital Signs Last 24 Hrs  T(C): 36.6 (29 Apr 2021 16:09), Max: 36.7 (29 Apr 2021 07:37)  T(F): 97.9 (29 Apr 2021 16:09), Max: 98.1 (29 Apr 2021 07:37)  HR: 66 (29 Apr 2021 16:09) (65 - 79)  BP: 138/69 (29 Apr 2021 16:09) (108/64 - 165/81)  BP(mean): --  RR: 18 (29 Apr 2021 16:09) (18 - 18)  SpO2: 97% (29 Apr 2021 16:09) (95% - 98%)    General appearance: No acute distress, Awake, Alert  HEENT: Normocephalic, Atraumatic, Conjunctiva clear, EOMI  Neck: Supple, No JVD, No tenderness  Lungs: Breath sound equal bilaterally, No wheezes, No rales  Cardiovascular: S1S2, Regular rhythm  Abdomen: Soft, Nontender, Nondistended, No guarding/rebound, Positive bowel sounds  Extremities: No clubbing, No cyanosis, No edema, No calf tenderness  Neuro: Strength equal bilaterally, No tremors  Psychiatric: Appropriate mood, Normal affect

## 2021-04-29 NOTE — ED ADULT NURSE REASSESSMENT NOTE - NS ED NURSE REASSESS COMMENT FT1
spoke with ct department regarding ct cardiac order. informed to give lopressor at 10am in preparation for exam. pt educated on medication and updated on  plan of care.

## 2021-04-29 NOTE — ED ADULT NURSE NOTE - OBJECTIVE STATEMENT
pt c/o of left sharp 8/10 sided mid sternal chest pain for last month. pt states pain radiates down to left leg. pain gets worse with activity. denies n/v. numbness tingling present. pt also complains of right flank pain for last 3 weeks. dizziness present in the morning. denies SOB. dry cough present.  anox3.placed on cardiac monitor. rr even and unlabored. moving all extremities well. skin warm to touch. pt educated on plan of care, pt able to successfully teach back plan of care to RN, RN will continue to reeducate pt during hospital stay.

## 2021-04-29 NOTE — H&P ADULT - ASSESSMENT
65M with a history of diabetes, BPH, and back pain who presented with chest discomfort with exertion and noted to have an elevated LAD calcium score?    Chest pain / Acute coronary syndrome - Troponin level was not elevated and EKG was without acute changes. Cardiology consultation noted. Planned for cardiac catheterization for further ischemic evaluation. Continue on aspirin. Telemetry monitoring.    Diabetes - Insulin coverage, close monitoring of blood glucose levels. To hold oral diabetes medications for now pending cardiac catheterization. The patient also reported symptoms suggestive of diabetic neuropathy in the fingers and feet.    Transaminitis - Mild elevation in AST/ALT. Suspect underlying steatohepatitis.

## 2021-04-29 NOTE — ED ADULT NURSE NOTE - NS ED NURSE LEVEL OF CONSCIOUSNESS AFFECT
Hemigard Postcare Instructions: The HEMIGARD strips are to remain completely dry for at least 5-7 days. Calm/Appropriate

## 2021-04-29 NOTE — ED PROVIDER NOTE - PHYSICAL EXAMINATION
General: Well appearing male in no acute distress  HEENT: Normocephalic, atraumatic. Moist mucous membranes. Oropharynx clear. No lymphadenopathy.  Eyes: No scleral icterus. EOMI. PEREZ.  Neck: Soft and supple. Full ROM without pain. No midline tenderness  Cardiac: Regular rate and regular rhythm. No murmurs. No LE edema.  Resp: Lungs CTAB. No wheezes, rales or rhonchi.  Abd: Soft, non-tender, non-distended. No guarding or rebound. No scars, masses, or lesions.  Back: Spine midline and non-tender. No CVA tenderness.    Skin: No rashes, abrasions, or lacerations.  Neuro: AO x 3. Moves all extremities symmetrically. Motor strength and sensation grossly intact.

## 2021-04-29 NOTE — ED PROVIDER NOTE - NS ED ROS FT
General: Denies fever, chills  HEENT: Denies visual changes, sore throat  Neck: Denies neck pain, neck stiffness  Resp: Denies coughing, SOB  Cardiovascular: Endorses CP. Denies palpitations, LE edema  GI: Denies abdominal pain, nausea, vomiting, diarrhea  : Denies dysuria, hematuria, incontinence  MSK: Endorses leg and back pain  Neuro: Denies HA, dizziness, numbness, weakness  Skin: Denies rashes

## 2021-04-29 NOTE — ED ADULT NURSE NOTE - ED STAT RN HANDOFF DETAILS
report given to LEE Henry. pt transported to a15R on telebox. rr even and unlabored. no chest pain or sob noted at this time.  rn made aware of transfer.

## 2021-04-29 NOTE — H&P ADULT - HISTORY OF PRESENT ILLNESS
65M presented with complaints of chest discomfort. He reported a long-standing history of intermittent chest discomfort over may years. Most recently, the patient noted that he had more frequent bouts of chest pain which he described as sharp in quality and located mostly in the left chest. Initially he thought the pain was due to heavy lifting but he also noted the chest discomfort while walking up stairs which was associated with dyspnea and intermittent episodes of palpitations. He denied any associated lightheadedness or dizziness. He is unaware of any relieving factors except resting but noted that the chest discomfort would be recurrent with exertion. He had taken ibuprofen with mild relief. He noted a stress test a few years prior which was reported as normal. In the emergency department, the patient underwent a cardiac CT with elevated LAD calcium score.

## 2021-04-29 NOTE — ED PROVIDER NOTE - OBJECTIVE STATEMENT
Pt is a 64yo M with chest pain. States it has been present for 2 months. Notes it is worse with exertion or heavy lifting. States that it radiates down to his legs and lower back. He reports that he saw Wolf from cardiology 3 years ago, had an echo done at that time and was told it was ok. He reports that the pain is in the middle of the chest, notes it as pressure or sharp pain. States it is relieved with ibuprofen. Denies f/c/n/v/sob

## 2021-04-29 NOTE — CONSULT NOTE ADULT - ASSESSMENT
Pt is a 66 y/o male with medical history of DM, who presents to [facility]-ED for chest pain. Pt states that he has been experiencing chest pain over the past month. Pt describes chest pain as sharp (sometimes pressure), constant, left sided radiating down left arm and left leg. Pain is worse with deep inspiration, and with certain movements. Pain also effects ROM in left hand and wrist. Pt took OTC Tylenol and Motrin which minimally relieved pain. In ED, Trop#1-negative, ECG-NSR HR @ 81, XRAY-No acute radiographic cardiopulmonary pathology.     Chest Pain  -Trop#1-negative  -ECG-NSR HR @ 81  - XRAY-No acute radiographic cardiopulmonary pathology  -Pt given ASA 325mg  -Pain description M/S in nature however patient has risk factors for CAD (former smoker, DM, HTN)   -CCTA ordered to evaluate for CAD  -Further ischemic w/u pendingn results of CCTA    HTN  -Recent BP-165/81  -Consider starting patient on Amlodipine 5mg

## 2021-04-29 NOTE — H&P ADULT - NSHPSOCIALHISTORY_GEN_ALL_CORE
Prior tobacco use  Denied alcohol or illicit drug use    Lives at home by himself    Family History: Mother with hypertension, Uncle with diabetes

## 2021-04-29 NOTE — CONSULT NOTE ADULT - ATTENDING COMMENTS
Pt is seen, examined, chart reviewed, d/w np/pa.  Management as outlined above.  Chest Pain: CCTA was ordered However due to elevated HR only CA Score was done which showed  Left anterior Descending Ca Score  227. Pt is planned to have C.

## 2021-04-30 ENCOUNTER — TRANSCRIPTION ENCOUNTER (OUTPATIENT)
Age: 66
End: 2021-04-30

## 2021-04-30 LAB
A1C WITH ESTIMATED AVERAGE GLUCOSE RESULT: 10.2 % — HIGH (ref 4–5.6)
ANION GAP SERPL CALC-SCNC: 10 MMOL/L — SIGNIFICANT CHANGE UP (ref 5–17)
BUN SERPL-MCNC: 10 MG/DL — SIGNIFICANT CHANGE UP (ref 8–20)
CALCIUM SERPL-MCNC: 9 MG/DL — SIGNIFICANT CHANGE UP (ref 8.6–10.2)
CHLORIDE SERPL-SCNC: 104 MMOL/L — SIGNIFICANT CHANGE UP (ref 98–107)
CHOLEST SERPL-MCNC: 151 MG/DL — SIGNIFICANT CHANGE UP
CO2 SERPL-SCNC: 26 MMOL/L — SIGNIFICANT CHANGE UP (ref 22–29)
COVID-19 SPIKE DOMAIN AB INTERP: NEGATIVE — SIGNIFICANT CHANGE UP
COVID-19 SPIKE DOMAIN ANTIBODY RESULT: 0.4 U/ML — SIGNIFICANT CHANGE UP
CREAT SERPL-MCNC: 0.72 MG/DL — SIGNIFICANT CHANGE UP (ref 0.5–1.3)
ESTIMATED AVERAGE GLUCOSE: 246 MG/DL — HIGH (ref 68–114)
GLUCOSE BLDC GLUCOMTR-MCNC: 126 MG/DL — HIGH (ref 70–99)
GLUCOSE BLDC GLUCOMTR-MCNC: 157 MG/DL — HIGH (ref 70–99)
GLUCOSE BLDC GLUCOMTR-MCNC: 180 MG/DL — HIGH (ref 70–99)
GLUCOSE BLDC GLUCOMTR-MCNC: 197 MG/DL — HIGH (ref 70–99)
GLUCOSE SERPL-MCNC: 166 MG/DL — HIGH (ref 70–99)
HCT VFR BLD CALC: 43.3 % — SIGNIFICANT CHANGE UP (ref 39–50)
HCV AB S/CO SERPL IA: 0.13 S/CO — SIGNIFICANT CHANGE UP (ref 0–0.99)
HCV AB SERPL-IMP: SIGNIFICANT CHANGE UP
HDLC SERPL-MCNC: 41 MG/DL — SIGNIFICANT CHANGE UP
HGB BLD-MCNC: 14.2 G/DL — SIGNIFICANT CHANGE UP (ref 13–17)
LIPID PNL WITH DIRECT LDL SERPL: 94 MG/DL — SIGNIFICANT CHANGE UP
MCHC RBC-ENTMCNC: 27.5 PG — SIGNIFICANT CHANGE UP (ref 27–34)
MCHC RBC-ENTMCNC: 32.8 GM/DL — SIGNIFICANT CHANGE UP (ref 32–36)
MCV RBC AUTO: 83.9 FL — SIGNIFICANT CHANGE UP (ref 80–100)
NON HDL CHOLESTEROL: 110 MG/DL — SIGNIFICANT CHANGE UP
PLATELET # BLD AUTO: 196 K/UL — SIGNIFICANT CHANGE UP (ref 150–400)
POTASSIUM SERPL-MCNC: 4.1 MMOL/L — SIGNIFICANT CHANGE UP (ref 3.5–5.3)
POTASSIUM SERPL-SCNC: 4.1 MMOL/L — SIGNIFICANT CHANGE UP (ref 3.5–5.3)
RBC # BLD: 5.16 M/UL — SIGNIFICANT CHANGE UP (ref 4.2–5.8)
RBC # FLD: 12.2 % — SIGNIFICANT CHANGE UP (ref 10.3–14.5)
SARS-COV-2 IGG+IGM SERPL QL IA: 0.4 U/ML — SIGNIFICANT CHANGE UP
SARS-COV-2 IGG+IGM SERPL QL IA: NEGATIVE — SIGNIFICANT CHANGE UP
SARS-COV-2 RNA SPEC QL NAA+PROBE: SIGNIFICANT CHANGE UP
SODIUM SERPL-SCNC: 139 MMOL/L — SIGNIFICANT CHANGE UP (ref 135–145)
TRIGL SERPL-MCNC: 80 MG/DL — SIGNIFICANT CHANGE UP
WBC # BLD: 8.43 K/UL — SIGNIFICANT CHANGE UP (ref 3.8–10.5)
WBC # FLD AUTO: 8.43 K/UL — SIGNIFICANT CHANGE UP (ref 3.8–10.5)

## 2021-04-30 PROCEDURE — 92929: CPT | Mod: LD

## 2021-04-30 PROCEDURE — 93010 ELECTROCARDIOGRAM REPORT: CPT

## 2021-04-30 PROCEDURE — 92928 PRQ TCAT PLMT NTRAC ST 1 LES: CPT | Mod: LD

## 2021-04-30 PROCEDURE — 99152 MOD SED SAME PHYS/QHP 5/>YRS: CPT

## 2021-04-30 PROCEDURE — 93458 L HRT ARTERY/VENTRICLE ANGIO: CPT | Mod: 26,XU

## 2021-04-30 PROCEDURE — 99232 SBSQ HOSP IP/OBS MODERATE 35: CPT

## 2021-04-30 RX ORDER — METOPROLOL TARTRATE 50 MG
25 TABLET ORAL
Refills: 0 | Status: DISCONTINUED | OUTPATIENT
Start: 2021-04-30 | End: 2021-05-01

## 2021-04-30 RX ORDER — SODIUM CHLORIDE 9 MG/ML
1000 INJECTION INTRAMUSCULAR; INTRAVENOUS; SUBCUTANEOUS
Refills: 0 | Status: DISCONTINUED | OUTPATIENT
Start: 2021-04-30 | End: 2021-05-01

## 2021-04-30 RX ORDER — TICAGRELOR 90 MG/1
90 TABLET ORAL EVERY 12 HOURS
Refills: 0 | Status: DISCONTINUED | OUTPATIENT
Start: 2021-05-01 | End: 2021-05-01

## 2021-04-30 RX ORDER — ALPRAZOLAM 0.25 MG
0.25 TABLET ORAL ONCE
Refills: 0 | Status: DISCONTINUED | OUTPATIENT
Start: 2021-04-30 | End: 2021-04-30

## 2021-04-30 RX ORDER — ATORVASTATIN CALCIUM 80 MG/1
80 TABLET, FILM COATED ORAL AT BEDTIME
Refills: 0 | Status: DISCONTINUED | OUTPATIENT
Start: 2021-04-30 | End: 2021-05-01

## 2021-04-30 RX ORDER — ASPIRIN/CALCIUM CARB/MAGNESIUM 324 MG
81 TABLET ORAL DAILY
Refills: 0 | Status: DISCONTINUED | OUTPATIENT
Start: 2021-04-30 | End: 2021-05-01

## 2021-04-30 RX ORDER — ACETAMINOPHEN 500 MG
1000 TABLET ORAL ONCE
Refills: 0 | Status: COMPLETED | OUTPATIENT
Start: 2021-04-30 | End: 2021-04-30

## 2021-04-30 RX ADMIN — AMLODIPINE BESYLATE 5 MILLIGRAM(S): 2.5 TABLET ORAL at 05:01

## 2021-04-30 RX ADMIN — SODIUM CHLORIDE 150 MILLILITER(S): 9 INJECTION INTRAMUSCULAR; INTRAVENOUS; SUBCUTANEOUS at 19:02

## 2021-04-30 RX ADMIN — Medication 0.25 MILLIGRAM(S): at 23:04

## 2021-04-30 RX ADMIN — Medication 2: at 08:02

## 2021-04-30 RX ADMIN — ATORVASTATIN CALCIUM 80 MILLIGRAM(S): 80 TABLET, FILM COATED ORAL at 20:08

## 2021-04-30 RX ADMIN — Medication 1000 MILLIGRAM(S): at 20:20

## 2021-04-30 RX ADMIN — Medication 400 MILLIGRAM(S): at 20:05

## 2021-04-30 RX ADMIN — Medication 2: at 12:08

## 2021-04-30 RX ADMIN — Medication 81 MILLIGRAM(S): at 12:34

## 2021-04-30 RX ADMIN — Medication 25 MILLIGRAM(S): at 20:08

## 2021-04-30 NOTE — DISCHARGE NOTE PROVIDER - CARE PROVIDER_API CALL
Bernardo Urbano)  Cardiovascular Disease  [address] [facility], [place] [address]  Phone: [phone]  Fax: [phone]  Follow Up Time:    Bernardo Urbano)  Cardiovascular Disease  [address] [facility], [address]  [place] [address]  Phone: [phone]  Fax: [phone]  Follow Up Time:     Gonzalo Bloom  CARDIOVASCULAR DISEASE  [address] [place] [address]  Phone: [phone]  Fax: [phone]  Follow Up Time: 1 week

## 2021-04-30 NOTE — DISCHARGE NOTE PROVIDER - CARE PROVIDERS DIRECT ADDRESSES
[email or web address]

## 2021-04-30 NOTE — DISCHARGE NOTE PROVIDER - PROVIDER TOKENS
PROVIDER:[TOKEN:[[id number]]] PROVIDER:[TOKEN:[[id number]]],PROVIDER:[TOKEN:[[id number]],FOLLOWUP:[1 week]]

## 2021-04-30 NOTE — DISCHARGE NOTE PROVIDER - NSDCCPTREATMENT_GEN_ALL_CORE_FT
PRINCIPAL PROCEDURE  Procedure: Left heart catheterization  Findings and Treatment: KEV LAD and KEV X3 to Diag-DAPT/Statin/BB/Norvasc

## 2021-04-30 NOTE — PROGRESS NOTE ADULT - SUBJECTIVE AND OBJECTIVE BOX
[place] [facility]                                                               Office: [address] [address]                                                              Telephone: [phone]. Fax:[phone]                                                                             PROGRESS NOTE  Reason for follow up: Chest pain  Overnight: No new events.   Update: [facility] today    Subjective: "I feel pretty good except my kidney hurts"      Review of symptoms:   Cardiac:  No chest pain. No dyspnea. No palpitations.  Respiratory:no cough. No dyspnea  Gastrointestinal: No diarrhea. No abdominal pain. No bleeding.   Neuro: No focal neuro complaints.  MS: R flank and LBP    Vitals:  T(C): 36.5 (04-29-21 @ 19:10), Max: 36.7 (04-29-21 @ 13:01)  HR: 70 (04-29-21 @ 19:10) (65 - 70)  BP: 135/84 (04-29-21 @ 19:10) (108/64 - 138/69)  RR: 18 (04-29-21 @ 19:10) (18 - 18)  SpO2: 97% (04-29-21 @ 19:10) (95% - 97%)  Wt(kg): --  I&O's Summary    Weight (kg): 102.058 (04-30 @ 12:17)      PHYSICAL EXAM:  Appearance: Comfortable. No acute distress  HEENT:  Atraumatic. Normocephalic.  Normal oral mucosa, PERRL, Neck is supple. No carotid bruit.   Neurologic: A & O x 3, no focal deficits. EOMI.  Cardiovascular: Normal S1 S2, No murmur, rubs/gallops. No JVD, No edema  Respiratory: Lungs clear to auscultation, unlabored   Gastrointestinal:  Soft, Non-tender, + BS  Lower Extremities: No edema, FEM 2+ bilat, DP 2+ bilat, RAD 2+ bilat  Psychiatry: Patient is calm. No agitation. Mood & affect appropriate  Skin: No rashes/ ecchymoses/cyanosis/ulcers visualized on the face, hands or feet.      MEDICATIONS  (STANDING):  amLODIPine   Tablet 5 milliGRAM(s) Oral daily  aspirin enteric coated 81 milliGRAM(s) Oral daily  dextrose 40% Gel 15 Gram(s) Oral once  dextrose 5%. 1000 milliLiter(s) (50 mL/Hr) IV Continuous <Continuous>  dextrose 5%. 1000 milliLiter(s) (100 mL/Hr) IV Continuous <Continuous>  dextrose 50% Injectable 25 Gram(s) IV Push once  dextrose 50% Injectable 12.5 Gram(s) IV Push once  dextrose 50% Injectable 25 Gram(s) IV Push once  glucagon  Injectable 1 milliGRAM(s) IntraMuscular once  insulin lispro (ADMELOG) corrective regimen sliding scale   SubCutaneous three times a day before meals    MEDICATIONS  (PRN):      DIAGNOSTIC TESTING:  [x ] CT cardiac:   < from: CT Heart Calcium Score (04.29.21 @ 12:57) >  EXAM:  CT HEART CALCIUM SCORE                          *** ADDENDUM 04/29/2021  ***    Evaluation of extracardiac findings was performed by radiology.    There is no mediastinal or hilar adenopathy. No discrete pulmonary nodule or confluent airspace opacities identified in the visualized lung. There is no evidence of interstitial lung disease, bronchiectasis, or fibrosis.    *** END OF ADDENDUM 04/29/2021  ***      PROCEDURE DATE:  04/29/2021          INTERPRETATION:  EXAM:  CT HEART CALCIUM SCORE    PROCEDURE DATE:  04/29/2021      INTERPRETATION:  History:  Cardiac calcium score. Risk factors for coronary artery disease, chest pain, cardiac CT angiogram not able to be performed due to persistently elevated HR >80 bpm despite beta-blockade and calcium channel blocker use.    Technique: Noncontrast CT of the heart was performed with cardiac gating for  calcium score.    Comparison: None    Findings:The heart is normal in size. Normal pericardium. No Aortic calcification.      Calcium score as described based on the Agatston equivalent.    Segment                                Score  --------------------------------------------------  Left Main                                0  Left anterior Descending      227  Circumflex                    0  Right                                       0  --------------------------------------------------  Total                                      227    Age/Sex Adjusted Percentile Rating (Raggi, Circulation 2000):  More than 50-70percentile. Coronary age: >70  years. Moderate calcification.      IMPRESSION:  Total Coronary Artery Calcium Score = 227.  50-75 percentile. Moderate calcification predominately in the LAD. Moderate atherosclerotic burden.    Cardiac CT angiogram not able to be performed due to persistently elevated HR >80 bpm despite beta-blockade and calcium channel blocker use. Recommend alternative means of ischemic workup or repeat attempt after HR is adequately controlled <60 bpm.    For noncardiac structures, kindly refer to radiology addendum.    < end of copied text >        LABS:	 	  CARDIAC MARKERS ( 29 Apr 2021 11:52 )  x     / <0.01 ng/mL / x     / x     / x      p-BNP 29 Apr 2021 11:52: x    , CARDIAC MARKERS ( 29 Apr 2021 08:39 )  x     / <0.01 ng/mL / x     / x     / x      p-BNP 29 Apr 2021 08:39: x                              14.2   8.43  )-----------( 196      ( 30 Apr 2021 03:26 )             43.3     04-30    139  |  104  |  10.0  ----------------------------<  166<H>  4.1   |  26.0  |  0.72    Ca    9.0      30 Apr 2021 03:26    TPro  7.6  /  Alb  4.1  /  TBili  0.4  /  DBili  x   /  AST  49<H>  /  ALT  43<H>  /  AlkPhos  110  04-29    Lipid Profile: Date: 04-30 @ 03:26  Total cholesterol 151; Direct LDL: --; HDL: 41; Triglycerides:80    TELEMETRY: Reviewed    ECG:  Reviewed by me. 	                                                                      [place] [facility]                                                               Office: [address] [address]                                                              Telephone: [phone]. Fax:[phone]                                                                             PROGRESS NOTE  Reason for follow up: Chest pain  Overnight: No new events.   Update: [facility] today    Subjective: "I feel pretty good except my kidney hurts"      Review of symptoms:   Cardiac:  No chest pain. No dyspnea. No palpitations.  Respiratory:no cough. No dyspnea  Gastrointestinal: No diarrhea. No abdominal pain. No bleeding.   Neuro: No focal neuro complaints.  MS: R flank and LBP    ASA 2  Mallampati 2  GFR 97  Cr. 0.72  BRA 0.9%      Vitals:  T(C): 36.5 (04-29-21 @ 19:10), Max: 36.7 (04-29-21 @ 13:01)  HR: 70 (04-29-21 @ 19:10) (65 - 70)  BP: 135/84 (04-29-21 @ 19:10) (108/64 - 138/69)  RR: 18 (04-29-21 @ 19:10) (18 - 18)  SpO2: 97% (04-29-21 @ 19:10) (95% - 97%)  Wt(kg): --  I&O's Summary    Weight (kg): 102.058 (04-30 @ 12:17)      PHYSICAL EXAM:  Appearance: Comfortable. No acute distress  HEENT:  Atraumatic. Normocephalic.  Normal oral mucosa, PERRL, Neck is supple. No carotid bruit.   Neurologic: A & O x 3, no focal deficits. EOMI.  Cardiovascular: Normal S1 S2, No murmur, rubs/gallops. No JVD, No edema  Respiratory: Lungs clear to auscultation, unlabored   Gastrointestinal:  Soft, Non-tender, + BS  Lower Extremities: No edema, FEM 2+ bilat, DP 2+ bilat, RAD 2+ bilat  Psychiatry: Patient is calm. No agitation. Mood & affect appropriate  Skin: No rashes/ ecchymoses/cyanosis/ulcers visualized on the face, hands or feet.      MEDICATIONS  (STANDING):  amLODIPine   Tablet 5 milliGRAM(s) Oral daily  aspirin enteric coated 81 milliGRAM(s) Oral daily  dextrose 40% Gel 15 Gram(s) Oral once  dextrose 5%. 1000 milliLiter(s) (50 mL/Hr) IV Continuous <Continuous>  dextrose 5%. 1000 milliLiter(s) (100 mL/Hr) IV Continuous <Continuous>  dextrose 50% Injectable 25 Gram(s) IV Push once  dextrose 50% Injectable 12.5 Gram(s) IV Push once  dextrose 50% Injectable 25 Gram(s) IV Push once  glucagon  Injectable 1 milliGRAM(s) IntraMuscular once  insulin lispro (ADMELOG) corrective regimen sliding scale   SubCutaneous three times a day before meals    MEDICATIONS  (PRN):      DIAGNOSTIC TESTING:  [x ] CT cardiac:   < from: CT Heart Calcium Score (04.29.21 @ 12:57) >  EXAM:  CT HEART CALCIUM SCORE                          *** ADDENDUM 04/29/2021  ***    Evaluation of extracardiac findings was performed by radiology.    There is no mediastinal or hilar adenopathy. No discrete pulmonary nodule or confluent airspace opacities identified in the visualized lung. There is no evidence of interstitial lung disease, bronchiectasis, or fibrosis.    *** END OF ADDENDUM 04/29/2021  ***      PROCEDURE DATE:  04/29/2021          INTERPRETATION:  EXAM:  CT HEART CALCIUM SCORE    PROCEDURE DATE:  04/29/2021      INTERPRETATION:  History:  Cardiac calcium score. Risk factors for coronary artery disease, chest pain, cardiac CT angiogram not able to be performed due to persistently elevated HR >80 bpm despite beta-blockade and calcium channel blocker use.    Technique: Noncontrast CT of the heart was performed with cardiac gating for  calcium score.    Comparison: None    Findings:The heart is normal in size. Normal pericardium. No Aortic calcification.      Calcium score as described based on the Agatston equivalent.    Segment                                Score  --------------------------------------------------  Left Main                                0  Left anterior Descending      227  Circumflex                    0  Right                                       0  --------------------------------------------------  Total                                      227    Age/Sex Adjusted Percentile Rating (Raggi, Circulation 2000):  More than 50-70percentile. Coronary age: >70  years. Moderate calcification.      IMPRESSION:  Total Coronary Artery Calcium Score = 227.  50-75 percentile. Moderate calcification predominately in the LAD. Moderate atherosclerotic burden.    Cardiac CT angiogram not able to be performed due to persistently elevated HR >80 bpm despite beta-blockade and calcium channel blocker use. Recommend alternative means of ischemic workup or repeat attempt after HR is adequately controlled <60 bpm.    For noncardiac structures, kindly refer to radiology addendum.    < end of copied text >        LABS:	 	  CARDIAC MARKERS ( 29 Apr 2021 11:52 )  x     / <0.01 ng/mL / x     / x     / x      p-BNP 29 Apr 2021 11:52: x    , CARDIAC MARKERS ( 29 Apr 2021 08:39 )  x     / <0.01 ng/mL / x     / x     / x      p-BNP 29 Apr 2021 08:39: x                              14.2   8.43  )-----------( 196      ( 30 Apr 2021 03:26 )             43.3     04-30    139  |  104  |  10.0  ----------------------------<  166<H>  4.1   |  26.0  |  0.72    Ca    9.0      30 Apr 2021 03:26    TPro  7.6  /  Alb  4.1  /  TBili  0.4  /  DBili  x   /  AST  49<H>  /  ALT  43<H>  /  AlkPhos  110  04-29    Lipid Profile: Date: 04-30 @ 03:26  Total cholesterol 151; Direct LDL: --; HDL: 41; Triglycerides:80    TELEMETRY: Reviewed    ECG:  Reviewed by me.

## 2021-04-30 NOTE — DISCHARGE NOTE PROVIDER - NSDCMRMEDTOKEN_GEN_ALL_CORE_FT
Aspirin Enteric Coated 81 mg oral delayed release tablet: 1 tab(s) orally once a day  Januvia 100 mg oral tablet: 1 tab(s) orally once a day  metFORMIN 1000 mg oral tablet: 1 tab(s) orally 2 times a day  pioglitazone 15 mg oral tablet: 1 tab(s) orally once a day   amLODIPine 5 mg oral tablet: 1 tab(s) orally once a day  Aspirin Enteric Coated 81 mg oral delayed release tablet: 1 tab(s) orally once a day  atorvastatin 80 mg oral tablet: 1 tab(s) orally once a day (at bedtime)  Januvia 100 mg oral tablet: 1 tab(s) orally once a day  metFORMIN 1000 mg oral tablet: 1 tab(s) orally 2 times a day  Resume on Monday 5/3/2021  metoprolol tartrate 37.5 mg oral tablet: 1 tab(s) orally 2 times a day  pioglitazone 15 mg oral tablet: 1 tab(s) orally once a day  ticagrelor 90 mg oral tablet: 1 tab(s) orally every 12 hours

## 2021-04-30 NOTE — PROGRESS NOTE ADULT - ASSESSMENT
Pt is a 66 y/o male with medical history of DM, who presents to [facility]-ED for chest pain. Pt states that he has been experiencing chest pain over the past month. Pt describes chest pain as sharp (sometimes pressure), constant, left sided radiating down left arm and left leg. Pain is worse with deep inspiration, and with certain movements. Pain also effects ROM in left hand and wrist. Pt took OTC Tylenol and Motrin which minimally relieved pain. In ED, Trop#1-negative, ECG-NSR HR @ 81, XRAY-No acute radiographic cardiopulmonary pathology.     Chest Pain  -Trops-negative  -ASA 81mg given  -[facility] today with abn Calcium score mod atherosclerotic plaque burden    HTN  -Cont Amlodipine 5mg

## 2021-04-30 NOTE — PROGRESS NOTE ADULT - SUBJECTIVE AND OBJECTIVE BOX
Department of Cardiology                                                                  [facility]/[address] [address] [address] [facility]                                                            Telephone: [phone]. Fax:[phone]                                                    Post- Procedure Note: Left Heart Cardiac Catheterization       65M presented with complaints of chest discomfort. He reported a long-standing history of intermittent chest discomfort over may years. Most recently, the patient noted that he had more frequent bouts of chest pain which he described as sharp in quality and located mostly in the left chest. Initially he thought the pain was due to heavy lifting but he also noted the chest discomfort while walking up stairs which was associated with dyspnea and intermittent episodes of palpitations. He denied any associated lightheadedness or dizziness. He is unaware of any relieving factors except resting but noted that the chest discomfort would be recurrent with exertion. He had taken ibuprofen with mild relief. He noted a stress test a few years prior which was reported as normal. In the emergency department, the patient underwent a cardiac CT with elevated LAD calcium score.   Now s/p left heart catheterization via right radial approach with Dr. Bloom: S/P KEV to mLAD HOLA 3.0 x 12mm, KEV-D1 HOLA 2.25 x 8mm, HOLA 2.5 x 8mm, HOLA 3.0x 12mm  Pt tolerated procedure well. Denies CP or SOB    Medications:  Fentanyl 50 mcg  Versed 1 mg  Heparin 19290 units  Brilinta 180 mg  Omnipaque 143cc         PAST MEDICAL & SURGICAL HISTORY:  DM (diabetes mellitus)  No significant past surgical history    Objective:  Vital Signs Last 24 Hrs  T(C): 36.4 (30 Apr 2021 12:19), Max: 36.5 (29 Apr 2021 19:10)  T(F): 97.5 (30 Apr 2021 12:19), Max: 97.7 (29 Apr 2021 19:10)  HR: 84 (30 Apr 2021 12:19) (70 - 84)  BP: 146/82 (30 Apr 2021 12:19) (135/84 - 146/82)  BP(mean): --  RR: 18 (30 Apr 2021 12:19) (18 - 18)  SpO2: 97% (30 Apr 2021 12:19) (97% - 97%)    CM: SR  Neuro: A&OX3, CN 2-12 intact  HEENT: NC, AT  Lungs: CTA B/L  CV: S1, S2, no murmur, RRR  Abd: Soft  Right radial band in place: Soft, no bleeding, no hematoma. R hand cool, 2+ radial, sensory and motor intact. 2 cc air removed from band  Extremity: + distal pulses  EKG: SR without any ST/T wave depressions or changes                        14.2   8.43  )-----------( 196      ( 30 Apr 2021 03:26 )             43.3     04-30    139  |  104  |  10.0  ----------------------------<  166<H>  4.1   |  26.0  |  0.72    Ca    9.0      30 Apr 2021 03:26    TPro  7.6  /  Alb  4.1  /  TBili  0.4  /  DBili  x   /  AST  49<H>  /  ALT  43<H>  /  AlkPhos  110  04-29      PI:  65M presented with complaints of chest discomfort. He reported a long-standing history of intermittent chest discomfort over may years. Most recently, the patient noted that he had more frequent bouts of chest pain which he described as sharp in quality and located mostly in the left chest. Initially he thought the pain was due to heavy lifting but he also noted the chest discomfort while walking up stairs which was associated with dyspnea and intermittent episodes of palpitations. He denied any associated lightheadedness or dizziness. He is unaware of any relieving factors except resting but noted that the chest discomfort would be recurrent with exertion. He had taken ibuprofen with mild relief. He noted a stress test a few years prior which was reported as normal. In the emergency department, the patient underwent a cardiac CT with elevated LAD calcium score. (29 Apr 2021 16:34)    Now s/p left heart catheterization via right radial approach with Dr. Bloom: S/P KEV to mLAD HOLA 3.0 x 12mm, KEV-D1 HOLA 2.25 x 8mm, HOLA 2.5 x 8mm, HOLA 3.0x 12mm    Pt is already in-patient  -post cardiac cath orders  -radial precautions  -bedrest x 2 hours post procedure  -EKG post cath  -labs and EKG in am  -Dual anti platelet therapy with aspirin/ brilinta  -statin therapy Lipitor 40mg qhs-will need to monitor LFT's  -beta blocker added metoprolol 25 mg bid  -DM management  -follow up outpt in 2 weeks with Cardiologist-Dr Urbano  -[facility] following during hospitalization  -cardiac rehab info provided/referral and communication to cardiac rehab completed  -Management per Hospitalist   -Discharge in am if overnight tele, EKG, labs in am all remain WNL

## 2021-04-30 NOTE — PROGRESS NOTE ADULT - SUBJECTIVE AND OBJECTIVE BOX
Cardiology Nurse Practitioner Note    Pt states that he has chest pain in his chest to left shoulder.  VSS.  EKG done and unchanged.  Upon further questioning pt admits to having this pain for 30-40 years.  IV tylenol ordered for pain.

## 2021-04-30 NOTE — DISCHARGE NOTE PROVIDER - NSDCCPCAREPLAN_GEN_ALL_CORE_FT
PRINCIPAL DISCHARGE DIAGNOSIS  Diagnosis: Chest pain  Assessment and Plan of Treatment:       SECONDARY DISCHARGE DIAGNOSES  Diagnosis: CAD (coronary artery disease)  Assessment and Plan of Treatment:

## 2021-04-30 NOTE — PROGRESS NOTE ADULT - SUBJECTIVE AND OBJECTIVE BOX
ADÁN SHABAZZ  ----------------------------------------  The patient was seen at bedside. Patient with chest pain and elevated calcium score on cardiac CT. Reported episode of chest discomfort this morning.    Vital Signs Last 24 Hrs  T(C): 36.5 (29 Apr 2021 19:10), Max: 36.7 (29 Apr 2021 13:01)  T(F): 97.7 (29 Apr 2021 19:10), Max: 98.1 (29 Apr 2021 13:01)  HR: 70 (29 Apr 2021 19:10) (65 - 70)  BP: 135/84 (29 Apr 2021 19:10) (108/64 - 165/81)  BP(mean): --  RR: 18 (29 Apr 2021 19:10) (18 - 18)  SpO2: 97% (29 Apr 2021 19:10) (95% - 97%)    CAPILLARY BLOOD GLUCOSE  POCT Blood Glucose.: 180 mg/dL (30 Apr 2021 08:02)    PHYSICAL EXAMINATION:  ----------------------------------------  General appearance: No acute distress, Awake, Alert  HEENT: Normocephalic, Atraumatic, Conjunctiva clear, EOMI  Neck: Supple, No JVD, No tenderness  Lungs: Breath sound equal bilaterally, No wheezes, No rales  Cardiovascular: S1S2, Regular rhythm  Abdomen: Soft, Nontender, Nondistended, No guarding/rebound, Positive bowel sounds  Extremities: No clubbing, No cyanosis, No edema, No calf tenderness  Neuro: Strength equal bilaterally, No tremors  Psychiatric: Appropriate mood, Normal affect    LABORATORY STUDIES:  ----------------------------------------             14.2   8.43  )-----------( 196      ( 30 Apr 2021 03:26 )             43.3     04-30    139  |  104  |  10.0  ----------------------------<  166<H>  4.1   |  26.0  |  0.72    Ca    9.0      30 Apr 2021 03:26    TPro  7.6  /  Alb  4.1  /  TBili  0.4  /  DBili  x   /  AST  49<H>  /  ALT  43<H>  /  AlkPhos  110  04-29    LIVER FUNCTIONS - ( 29 Apr 2021 08:39 )  Alb: 4.1 g/dL / Pro: 7.6 g/dL / ALK PHOS: 110 U/L / ALT: 43 U/L / AST: 49 U/L / GGT: x           CARDIAC MARKERS ( 29 Apr 2021 11:52 )  x     / <0.01 ng/mL / x     / x     / x      CARDIAC MARKERS ( 29 Apr 2021 08:39 )  x     / <0.01 ng/mL / x     / x     / x        MEDICATIONS  (STANDING):  amLODIPine   Tablet 5 milliGRAM(s) Oral daily  dextrose 40% Gel 15 Gram(s) Oral once  dextrose 5%. 1000 milliLiter(s) (50 mL/Hr) IV Continuous <Continuous>  dextrose 5%. 1000 milliLiter(s) (100 mL/Hr) IV Continuous <Continuous>  dextrose 50% Injectable 25 Gram(s) IV Push once  dextrose 50% Injectable 12.5 Gram(s) IV Push once  dextrose 50% Injectable 25 Gram(s) IV Push once  glucagon  Injectable 1 milliGRAM(s) IntraMuscular once  insulin lispro (ADMELOG) corrective regimen sliding scale   SubCutaneous three times a day before meals      ASSESSMENT / PLAN:  ----------------------------------------  65M with a history of diabetes, BPH, and back pain who presented with chest discomfort with exertion and noted to have an elevated LAD calcium score.    Chest pain / Acute coronary syndrome - Serial troponin levels were not elevated. Continue on aspirin. Cardiology consultation noted with recommendations for further ischemic evaluation.    Diabetes - Insulin coverage, close monitoring of blood glucose levels. The patient also reported symptoms suggestive of diabetic neuropathy in the fingers and feet.    Transaminitis - Mild elevation in AST/ALT. Suspect underlying steatohepatitis. Repeat laboratory studies ordered to monitor.      Anticipate discharge in 24-48 hours pending test results and clinical course. ADÁN SHABAZZ  ----------------------------------------  The patient was seen at bedside. Patient with chest pain and elevated calcium score on cardiac CT. Reported episode of chest discomfort this morning.    HPI:  65M with a history of diabetes and BPH who presented with increasing chest discomfort which he has had for many months but had been progressively more intense over the past few days. He was seen by Cardiology in consultation with cardiac CT noting an elevated calcium score in the LAD and the patient was thought to benefit from admission to the hospital for further ischemic evaluation.    Vital Signs Last 24 Hrs  T(C): 36.5 (29 Apr 2021 19:10), Max: 36.7 (29 Apr 2021 13:01)  T(F): 97.7 (29 Apr 2021 19:10), Max: 98.1 (29 Apr 2021 13:01)  HR: 70 (29 Apr 2021 19:10) (65 - 70)  BP: 135/84 (29 Apr 2021 19:10) (108/64 - 165/81)  BP(mean): --  RR: 18 (29 Apr 2021 19:10) (18 - 18)  SpO2: 97% (29 Apr 2021 19:10) (95% - 97%)    CAPILLARY BLOOD GLUCOSE  POCT Blood Glucose.: 180 mg/dL (30 Apr 2021 08:02)    PHYSICAL EXAMINATION:  ----------------------------------------  General appearance: No acute distress, Awake, Alert  HEENT: Normocephalic, Atraumatic, Conjunctiva clear, EOMI  Neck: Supple, No JVD, No tenderness  Lungs: Breath sound equal bilaterally, No wheezes, No rales  Cardiovascular: S1S2, Regular rhythm  Abdomen: Soft, Nontender, Nondistended, No guarding/rebound, Positive bowel sounds  Extremities: No clubbing, No cyanosis, No edema, No calf tenderness  Neuro: Strength equal bilaterally, No tremors  Psychiatric: Appropriate mood, Normal affect    LABORATORY STUDIES:  ----------------------------------------             14.2   8.43  )-----------( 196      ( 30 Apr 2021 03:26 )             43.3     04-30    139  |  104  |  10.0  ----------------------------<  166<H>  4.1   |  26.0  |  0.72    Ca    9.0      30 Apr 2021 03:26    TPro  7.6  /  Alb  4.1  /  TBili  0.4  /  DBili  x   /  AST  49<H>  /  ALT  43<H>  /  AlkPhos  110  04-29    LIVER FUNCTIONS - ( 29 Apr 2021 08:39 )  Alb: 4.1 g/dL / Pro: 7.6 g/dL / ALK PHOS: 110 U/L / ALT: 43 U/L / AST: 49 U/L / GGT: x           CARDIAC MARKERS ( 29 Apr 2021 11:52 )  x     / <0.01 ng/mL / x     / x     / x      CARDIAC MARKERS ( 29 Apr 2021 08:39 )  x     / <0.01 ng/mL / x     / x     / x        MEDICATIONS  (STANDING):  amLODIPine   Tablet 5 milliGRAM(s) Oral daily  dextrose 40% Gel 15 Gram(s) Oral once  dextrose 5%. 1000 milliLiter(s) (50 mL/Hr) IV Continuous <Continuous>  dextrose 5%. 1000 milliLiter(s) (100 mL/Hr) IV Continuous <Continuous>  dextrose 50% Injectable 25 Gram(s) IV Push once  dextrose 50% Injectable 12.5 Gram(s) IV Push once  dextrose 50% Injectable 25 Gram(s) IV Push once  glucagon  Injectable 1 milliGRAM(s) IntraMuscular once  insulin lispro (ADMELOG) corrective regimen sliding scale   SubCutaneous three times a day before meals      ASSESSMENT / PLAN:  ----------------------------------------  65M with a history of diabetes, BPH, and back pain who presented with chest discomfort with exertion and noted to have an elevated LAD calcium score.    Chest pain / Acute coronary syndrome - Serial troponin levels were not elevated. Continue on aspirin. Cardiology consultation noted with recommendations for further ischemic evaluation.    Diabetes - Insulin coverage, close monitoring of blood glucose levels. The patient also reported symptoms suggestive of diabetic neuropathy in the fingers and feet.    Transaminitis - Mild elevation in AST/ALT. Suspect underlying steatohepatitis. Repeat laboratory studies ordered to monitor.      Anticipate discharge in 24-48 hours pending test results and clinical course.

## 2021-05-01 ENCOUNTER — TRANSCRIPTION ENCOUNTER (OUTPATIENT)
Age: 66
End: 2021-05-01

## 2021-05-01 VITALS
HEART RATE: 81 BPM | OXYGEN SATURATION: 98 % | SYSTOLIC BLOOD PRESSURE: 110 MMHG | DIASTOLIC BLOOD PRESSURE: 64 MMHG | RESPIRATION RATE: 16 BRPM

## 2021-05-01 LAB
ALBUMIN SERPL ELPH-MCNC: 3.7 G/DL — SIGNIFICANT CHANGE UP (ref 3.3–5.2)
ALP SERPL-CCNC: 99 U/L — SIGNIFICANT CHANGE UP (ref 40–120)
ALT FLD-CCNC: 37 U/L — SIGNIFICANT CHANGE UP
ANION GAP SERPL CALC-SCNC: 10 MMOL/L — SIGNIFICANT CHANGE UP (ref 5–17)
AST SERPL-CCNC: 41 U/L — HIGH
BASOPHILS # BLD AUTO: 0.03 K/UL — SIGNIFICANT CHANGE UP (ref 0–0.2)
BASOPHILS NFR BLD AUTO: 0.4 % — SIGNIFICANT CHANGE UP (ref 0–2)
BILIRUB SERPL-MCNC: 0.7 MG/DL — SIGNIFICANT CHANGE UP (ref 0.4–2)
BUN SERPL-MCNC: 13 MG/DL — SIGNIFICANT CHANGE UP (ref 8–20)
CALCIUM SERPL-MCNC: 9.3 MG/DL — SIGNIFICANT CHANGE UP (ref 8.6–10.2)
CHLORIDE SERPL-SCNC: 102 MMOL/L — SIGNIFICANT CHANGE UP (ref 98–107)
CO2 SERPL-SCNC: 23 MMOL/L — SIGNIFICANT CHANGE UP (ref 22–29)
CREAT SERPL-MCNC: 0.48 MG/DL — LOW (ref 0.5–1.3)
EOSINOPHIL # BLD AUTO: 0.14 K/UL — SIGNIFICANT CHANGE UP (ref 0–0.5)
EOSINOPHIL NFR BLD AUTO: 1.6 % — SIGNIFICANT CHANGE UP (ref 0–6)
GLUCOSE BLDC GLUCOMTR-MCNC: 183 MG/DL — HIGH (ref 70–99)
GLUCOSE SERPL-MCNC: 206 MG/DL — HIGH (ref 70–99)
HCT VFR BLD CALC: 44.4 % — SIGNIFICANT CHANGE UP (ref 39–50)
HGB BLD-MCNC: 15.2 G/DL — SIGNIFICANT CHANGE UP (ref 13–17)
IMM GRANULOCYTES NFR BLD AUTO: 0.5 % — SIGNIFICANT CHANGE UP (ref 0–1.5)
LYMPHOCYTES # BLD AUTO: 1.35 K/UL — SIGNIFICANT CHANGE UP (ref 1–3.3)
LYMPHOCYTES # BLD AUTO: 15.9 % — SIGNIFICANT CHANGE UP (ref 13–44)
MCHC RBC-ENTMCNC: 28.1 PG — SIGNIFICANT CHANGE UP (ref 27–34)
MCHC RBC-ENTMCNC: 34.2 GM/DL — SIGNIFICANT CHANGE UP (ref 32–36)
MCV RBC AUTO: 82.1 FL — SIGNIFICANT CHANGE UP (ref 80–100)
MONOCYTES # BLD AUTO: 0.73 K/UL — SIGNIFICANT CHANGE UP (ref 0–0.9)
MONOCYTES NFR BLD AUTO: 8.6 % — SIGNIFICANT CHANGE UP (ref 2–14)
NEUTROPHILS # BLD AUTO: 6.2 K/UL — SIGNIFICANT CHANGE UP (ref 1.8–7.4)
NEUTROPHILS NFR BLD AUTO: 73 % — SIGNIFICANT CHANGE UP (ref 43–77)
PLATELET # BLD AUTO: 195 K/UL — SIGNIFICANT CHANGE UP (ref 150–400)
POTASSIUM SERPL-MCNC: 3.7 MMOL/L — SIGNIFICANT CHANGE UP (ref 3.5–5.3)
POTASSIUM SERPL-SCNC: 3.7 MMOL/L — SIGNIFICANT CHANGE UP (ref 3.5–5.3)
PROT SERPL-MCNC: 7 G/DL — SIGNIFICANT CHANGE UP (ref 6.6–8.7)
RBC # BLD: 5.41 M/UL — SIGNIFICANT CHANGE UP (ref 4.2–5.8)
RBC # FLD: 12.2 % — SIGNIFICANT CHANGE UP (ref 10.3–14.5)
SODIUM SERPL-SCNC: 135 MMOL/L — SIGNIFICANT CHANGE UP (ref 135–145)
WBC # BLD: 8.49 K/UL — SIGNIFICANT CHANGE UP (ref 3.8–10.5)
WBC # FLD AUTO: 8.49 K/UL — SIGNIFICANT CHANGE UP (ref 3.8–10.5)

## 2021-05-01 PROCEDURE — 85025 COMPLETE CBC W/AUTO DIFF WBC: CPT

## 2021-05-01 PROCEDURE — U0003: CPT

## 2021-05-01 PROCEDURE — U0005: CPT

## 2021-05-01 PROCEDURE — 93010 ELECTROCARDIOGRAM REPORT: CPT

## 2021-05-01 PROCEDURE — 83036 HEMOGLOBIN GLYCOSYLATED A1C: CPT

## 2021-05-01 PROCEDURE — C1874: CPT

## 2021-05-01 PROCEDURE — 80061 LIPID PANEL: CPT

## 2021-05-01 PROCEDURE — 71046 X-RAY EXAM CHEST 2 VIEWS: CPT

## 2021-05-01 PROCEDURE — 99285 EMERGENCY DEPT VISIT HI MDM: CPT | Mod: 25

## 2021-05-01 PROCEDURE — 86769 SARS-COV-2 COVID-19 ANTIBODY: CPT

## 2021-05-01 PROCEDURE — 80048 BASIC METABOLIC PNL TOTAL CA: CPT

## 2021-05-01 PROCEDURE — C9600: CPT | Mod: LD

## 2021-05-01 PROCEDURE — 75571 CT HRT W/O DYE W/CA TEST: CPT

## 2021-05-01 PROCEDURE — 85027 COMPLETE CBC AUTOMATED: CPT

## 2021-05-01 PROCEDURE — C9601: CPT | Mod: LD

## 2021-05-01 PROCEDURE — 36415 COLL VENOUS BLD VENIPUNCTURE: CPT

## 2021-05-01 PROCEDURE — 84484 ASSAY OF TROPONIN QUANT: CPT

## 2021-05-01 PROCEDURE — 82962 GLUCOSE BLOOD TEST: CPT

## 2021-05-01 PROCEDURE — C1894: CPT

## 2021-05-01 PROCEDURE — 93005 ELECTROCARDIOGRAM TRACING: CPT

## 2021-05-01 PROCEDURE — C1725: CPT

## 2021-05-01 PROCEDURE — C1887: CPT

## 2021-05-01 PROCEDURE — 86803 HEPATITIS C AB TEST: CPT

## 2021-05-01 PROCEDURE — 99153 MOD SED SAME PHYS/QHP EA: CPT

## 2021-05-01 PROCEDURE — C1769: CPT

## 2021-05-01 PROCEDURE — 99152 MOD SED SAME PHYS/QHP 5/>YRS: CPT

## 2021-05-01 PROCEDURE — 93458 L HRT ARTERY/VENTRICLE ANGIO: CPT | Mod: XU

## 2021-05-01 PROCEDURE — 99233 SBSQ HOSP IP/OBS HIGH 50: CPT

## 2021-05-01 PROCEDURE — 80053 COMPREHEN METABOLIC PANEL: CPT

## 2021-05-01 RX ORDER — METOPROLOL TARTRATE 50 MG
1.5 TABLET ORAL
Qty: 270 | Refills: 0
Start: 2021-05-01 | End: 2021-07-29

## 2021-05-01 RX ORDER — METFORMIN HYDROCHLORIDE 850 MG/1
1 TABLET ORAL
Qty: 0 | Refills: 0 | DISCHARGE

## 2021-05-01 RX ORDER — ATORVASTATIN CALCIUM 80 MG/1
1 TABLET, FILM COATED ORAL
Qty: 90 | Refills: 0
Start: 2021-05-01 | End: 2021-07-29

## 2021-05-01 RX ORDER — AMLODIPINE BESYLATE 2.5 MG/1
1 TABLET ORAL
Qty: 90 | Refills: 0
Start: 2021-05-01 | End: 2021-07-29

## 2021-05-01 RX ORDER — METOPROLOL TARTRATE 50 MG
25 TABLET ORAL ONCE
Refills: 0 | Status: COMPLETED | OUTPATIENT
Start: 2021-05-01 | End: 2021-05-01

## 2021-05-01 RX ORDER — TICAGRELOR 90 MG/1
1 TABLET ORAL
Qty: 180 | Refills: 0
Start: 2021-05-01 | End: 2021-07-29

## 2021-05-01 RX ORDER — METOPROLOL TARTRATE 50 MG
37.5 TABLET ORAL
Refills: 0 | Status: DISCONTINUED | OUTPATIENT
Start: 2021-05-01 | End: 2021-05-01

## 2021-05-01 RX ORDER — METOPROLOL TARTRATE 50 MG
1 TABLET ORAL
Qty: 180 | Refills: 0
Start: 2021-05-01 | End: 2021-07-29

## 2021-05-01 RX ADMIN — Medication 250 MILLIGRAM(S): at 10:18

## 2021-05-01 RX ADMIN — Medication 25 MILLIGRAM(S): at 08:24

## 2021-05-01 RX ADMIN — AMLODIPINE BESYLATE 5 MILLIGRAM(S): 2.5 TABLET ORAL at 05:18

## 2021-05-01 RX ADMIN — Medication 250 MILLIGRAM(S): at 09:15

## 2021-05-01 RX ADMIN — Medication 2: at 07:53

## 2021-05-01 RX ADMIN — TICAGRELOR 90 MILLIGRAM(S): 90 TABLET ORAL at 05:17

## 2021-05-01 RX ADMIN — Medication 81 MILLIGRAM(S): at 07:54

## 2021-05-01 RX ADMIN — Medication 25 MILLIGRAM(S): at 05:17

## 2021-05-01 NOTE — PROGRESS NOTE ADULT - SUBJECTIVE AND OBJECTIVE BOX
[place] [facility]                                                               Office: [address] [address]                                                              Telephone: [phone]. Fax:[phone]                                                                             PROGRESS NOTE  Reason for follow up: Chest pain  Overnight: No new events.   Update: Now s/p left heart catheterization via right radial approach with Dr. Bloom:   < from: Cardiac Cath Lab - Adult (04.30.21 @ 16:23) >  Successful PCI of Mid LAD with KEV x 2 ([place] 3.0x12 mm x 2)  Successful PCI of Prox Diag 1 with KEV x 2 (Luis Felipe 2.25x8 and 2.5x8mm) Left  < end of copied text >  Pt tolerated procedure well. Denies CP or SOB, now, however he is complaining of chest discomfort when he moves his arms or touches his chest.     Subjective: "  I do not have chest pain now"      	  Vitals:  T(C): 36.8 (05-01-21 @ 07:55), Max: 36.8 (04-30-21 @ 20:00)  HR: 83 (05-01-21 @ 07:55) (69 - 101)  BP: 126/89 (05-01-21 @ 07:55) (126/89 - 173/90)  RR: 16 (05-01-21 @ 07:55) (16 - 20)  SpO2: 98% (05-01-21 @ 07:55) (95% - 99%)    I&O's Summary    Weight (kg): 92.986 (04-30 @ 12:30)      PHYSICAL EXAM:  Appearance: Comfortable. No acute distress  HEENT:  Head and neck: Atraumatic. Normocephalic.  Normal oral mucosa, PERRL, Neck is supple. No JVD, No carotid bruit.   Neurologic: A & O x 3, no focal deficits. EOMI.  Cardiovascular: Normal S1 S2, No murmur, rubs/gallops. No JVD, No edema  Respiratory: Lungs clear to auscultation  Gastrointestinal:  Soft, Non-tender, + BS  Lower Extremities: No edema. R radial approach site w/o s/s bleeding or hematoma. Site left DAIANA,   Psychiatry: Patient is calm. No agitation. Anxious. Mood & affect appropriate  Skin: No rashes/ ecchymoses/cyanosis/ulcers visualized on the face, hands or feet.        CURRENT MEDICATIONS:  amLODIPine   Tablet 5 milliGRAM(s) Oral daily  metoprolol tartrate 37.7 milliGRAM(s) Oral two times a day  metoprolol tartrate 25 milliGRAM(s) Oral once    atorvastatin  dextrose 40% Gel  dextrose 50% Injectable  dextrose 50% Injectable  dextrose 50% Injectable  glucagon  Injectable  insulin lispro (ADMELOG) corrective regimen sliding scale  aspirin enteric coated  dextrose 5%.  dextrose 5%.  sodium chloride 0.9%.  ticagrelor      DIAGNOSTIC TESTING:  < from: CT Heart Calcium Score (04.29.21 @ 12:57) >  IMPRESSION:  Total Coronary Artery Calcium Score = 227.  50-75 percentile. Moderate calcification predominately in the LAD. Moderate atherosclerotic burden.    Cardiac CT angiogram not able to be performed due to persistently elevated HR >80 bpm despite beta-blockade and calcium channel blocker use. Recommend alternative means of ischemic workup or repeat attempt after HR is adequately controlled <60 bpm.    For noncardiac structures, kindly refer to radiology addendum.      < from: Cardiac Cath Lab - Adult (04.30.21 @ 16:23) >  CORONARY VESSELS: The coronary circulation is left dominant.  LM:   --  LM: Normal. The vessel was normal sized, not calcified, and not  tortuous. Angiography showed no evidence of disease.  LAD:   --  LAD: The vessel was normal sized, moderately calcified, and  mildly tortuous. Angiography showed multiple discrete lesions.  --  Mid LAD: There was a diffuse 75 % stenosis in the middle third of the  vessel segment.The lesion was without evidence of thrombus. There was  WALESKA grade 3 flow through the vessel (brisk flow). It appears amenable to  percutaneous intervention.  --  D1: The vessel was normal sized, mildly calcified, and mildly tortuous.  Angiography showed multiple discrete lesions. There was a diffuse 90 %  stenosis in the proximal third of the vessel segment. The lesion was  without evidence of thrombus. There was WALESKA grade 3 flow through the  vessel (brisk flow). This lesion is a likely culpritfor the patient's  anginal symptoms. It appears amenable to percutaneous intervention.  CX:   --  Circumflex: The vessel was large sized (dominant), not calcified,  and not tortuous. Angiography showed minor luminal irregularities with no  flow limiting lesions.  RCA:   --  RCA: The vessel was small (non-dominant), not calcified, and not  tortuous. Angiography showed minor luminal irregularities with no flow  limiting lesions.  COMPLICATIONS: No complications occurred during the cath lab visit.  DIAGNOSTIC IMPRESSIONS: There is significant single vessel coronary artery  disease.  Mid LAD=75%  Prox Diag=90%  Successful PCI of Mid LAD with KEV x 2 ([place] 3.0x12 mm x 2)  Successful PCI of Prox Diag 1 with KEV x 2 ([place] 2.25x8 and 2.5x8mm) Left  ventricular function is normal.  LVEF=60%  DIAGNOSTIC RECOMMENDATIONS: The patient should continue with the present  medications. Patient management should include aggressive medical therapy,  close monitoring of BUN and creatinine, and resumption of all previous  activities in 2 days. The patient should follow a low calorie diet.  Medical management is recommended.  Prepared and signed by  Gonzalo Bloom MD  Signed 04/30/2021 18:48:02    < end of copied text >  : 	      LABS:	 	  CARDIAC MARKERS ( 29 Apr 2021 11:52 )  x     / <0.01 ng/mL / x     / x     / x      p-BNP 29 Apr 2021 11:52: x    , CARDIAC MARKERS ( 29 Apr 2021 08:39 )  x     / <0.01 ng/mL / x     / x     / x      p-BNP 29 Apr 2021 08:39: x                              15.2   8.49  )-----------( 195      ( 01 May 2021 05:32 )             44.4     05-01    135  |  102  |  13.0  ----------------------------<  206<H>  3.7   |  23.0  |  0.48<L>    Ca    9.3      01 May 2021 05:32    TPro  7.0  /  Alb  3.7  /  TBili  0.7  /  DBili  x   /  AST  41<H>  /  ALT  37  /  AlkPhos  99  05-01    proBNP:   Lipid Profile: Date: 04-30 @ 03:26  Total cholesterol 151; Direct LDL: --; HDL: 41; Triglycerides:80      TELEMETRY: Sinus Rhythm 80s-105  ECG:  Sinus Rhythm     Assessment and Plan:   · Assessment	  Pt is a 64 y/o male with medical history of DM, who presents to [facility]ED for chest pain. Pt states that he has been experiencing chest pain over the past month. Pt describes chest pain as sharp (sometimes pressure), constant, left sided radiating down left arm and left leg. Pain is worse with deep inspiration, and with certain movements. Pain also effects ROM in left hand and wrist. Pt took OTC Tylenol and Motrin which minimally relieved pain. In ED, Trop#1-negative, ECG-NSR HR @ 81, XRAY-No acute radiographic cardiopulmonary pathology.  Patient now [facility] with successful PCI to the LAD and first Diag.     Chest Pain - Patient continues to c/o CP which he describes as occurring with movement or his arms and when he presses on his chest, he admits this has been happening for 20-30 years. He admits to lack of any physical activity and to having gained weight. EKG normal SR, no changes.  -Naproxen 250 mg po x 1   -Patient now [facility] with 4DES, VSS overnight, no events on telemetry  -ASA 81mg given  -Brilinta 90 mg po  Q12 hrs  -Atrovastatin 80 mg po QHS  -Metoprolol increased to 37.5 mg po twice a day for HR  while awake  -cardiac rehab info provided/referral and communication to cardiac rehab completed. Explained to patient that this might assist with the muscular discomfort that    -Patient to be discharged to home        HTN  -Cont Amlodipine 5mg  -Metoprolol 37.5 mg po twice daily

## 2021-05-01 NOTE — DISCHARGE NOTE NURSING/CASE MANAGEMENT/SOCIAL WORK - PATIENT PORTAL LINK FT
You can access the FollowMyHealth Patient Portal offered by [facility] by registering at the following website: [email or web address]. By joining [employer]’s FollowMyHealth portal, you will also be able to view your health information using other applications (apps) compatible with our system.

## 2021-05-01 NOTE — PROGRESS NOTE ADULT - ATTENDING COMMENTS
Patient was seen and examined post LHC with KEV ot the mid LAD  and prox diag   No chest pain in AM     Labs reviewed       Dx: CAD s/p KEV x 2 to the mid LAD and KEV x2 to the prox LAD, DM2   - patient notes no chest pain or shortness of breath   - on ASA , Brilinta and Lipitor   - continue with Lopressor 37.5 mg po q 12hrs   - stable for discharge home and follow up with Dr. Castle in the outpatient setting     Della Venegas D.O. [facility]  Cardiology/Vascular Cardiology -[facility]   Telephone # [phone]

## 2021-05-03 ENCOUNTER — APPOINTMENT (OUTPATIENT)
Dept: FAMILY MEDICINE | Facility: CLINIC | Age: 66
End: 2021-05-03
Payer: MEDICARE

## 2021-05-03 ENCOUNTER — NON-APPOINTMENT (OUTPATIENT)
Age: 66
End: 2021-05-03

## 2021-05-03 VITALS
OXYGEN SATURATION: 98 % | RESPIRATION RATE: 16 BRPM | BODY MASS INDEX: 31.55 KG/M2 | HEART RATE: 85 BPM | DIASTOLIC BLOOD PRESSURE: 86 MMHG | SYSTOLIC BLOOD PRESSURE: 130 MMHG | WEIGHT: 201 LBS | HEIGHT: 67 IN | TEMPERATURE: 97.3 F

## 2021-05-03 DIAGNOSIS — G89.29 LOW BACK PAIN: ICD-10-CM

## 2021-05-03 DIAGNOSIS — Z20.822 CONTACT WITH AND (SUSPECTED) EXPOSURE TO COVID-19: ICD-10-CM

## 2021-05-03 DIAGNOSIS — M54.5 LOW BACK PAIN: ICD-10-CM

## 2021-05-03 DIAGNOSIS — R20.2 PARESTHESIA OF SKIN: ICD-10-CM

## 2021-05-03 PROBLEM — E11.9 TYPE 2 DIABETES MELLITUS WITHOUT COMPLICATIONS: Chronic | Status: ACTIVE | Noted: 2021-04-29

## 2021-05-03 PROCEDURE — 99072 ADDL SUPL MATRL&STAF TM PHE: CPT

## 2021-05-03 PROCEDURE — 99214 OFFICE O/P EST MOD 30 MIN: CPT | Mod: 25

## 2021-05-03 NOTE — PHYSICAL EXAM
[Well Nourished] : well nourished [Normal Sclera/Conjunctiva] : normal sclera/conjunctiva [PERRL] : pupils equal round and reactive to light [EOMI] : extraocular movements intact [Normal Outer Ear/Nose] : the outer ears and nose were normal in appearance [Normal Oropharynx] : the oropharynx was normal [No JVD] : no jugular venous distention [No Lymphadenopathy] : no lymphadenopathy [Supple] : supple [Thyroid Normal, No Nodules] : the thyroid was normal and there were no nodules present [No Respiratory Distress] : no respiratory distress  [No Accessory Muscle Use] : no accessory muscle use [Clear to Auscultation] : lungs were clear to auscultation bilaterally [Normal Rate] : normal rate  [Regular Rhythm] : with a regular rhythm [Normal S1, S2] : normal S1 and S2 [No Murmur] : no murmur heard [No Carotid Bruits] : no carotid bruits [No Abdominal Bruit] : a ~M bruit was not heard ~T in the abdomen [No Varicosities] : no varicosities [Pedal Pulses Present] : the pedal pulses are present [No Edema] : there was no peripheral edema [No Palpable Aorta] : no palpable aorta [No Extremity Clubbing/Cyanosis] : no extremity clubbing/cyanosis [Soft] : abdomen soft [Non Tender] : non-tender [Non-distended] : non-distended [No Masses] : no abdominal mass palpated [No HSM] : no HSM [Normal Bowel Sounds] : normal bowel sounds [Normal Posterior Cervical Nodes] : no posterior cervical lymphadenopathy [Normal Anterior Cervical Nodes] : no anterior cervical lymphadenopathy [No CVA Tenderness] : no CVA  tenderness [No Spinal Tenderness] : no spinal tenderness [No Joint Swelling] : no joint swelling [Grossly Normal Strength/Tone] : grossly normal strength/tone [No Rash] : no rash [Coordination Grossly Intact] : coordination grossly intact [No Focal Deficits] : no focal deficits [Normal Gait] : normal gait [Deep Tendon Reflexes (DTR)] : deep tendon reflexes were 2+ and symmetric [Normal Affect] : the affect was normal [Normal Insight/Judgement] : insight and judgment were intact [Normal] : affect was normal and insight and judgment were intact [de-identified] : obese [de-identified] : Dyspnea is experienced with chest pain [de-identified] : Experiences chest pain at rest, chest wall tenderness elicited [de-identified] : ankle edema

## 2021-05-03 NOTE — HISTORY OF PRESENT ILLNESS
[FreeTextEntry1] : Patient with a PMHx of DM2, BPH, chronic low back pain presents for f/u of diabetes management, discuss labs.  Failed stress Test was in hospital had 4 stents placed in LAD has cardio follow up 2 days by Dr Bloom\par

## 2021-05-03 NOTE — ASSESSMENT
[FreeTextEntry1] : Patient with a PMHx of DM2, BPH, chronic low back pain presents for f/u of diabetes management, discuss labs.  Failed stress Test was in hospital had 4 stents placed in LAD has cardio follow up 2 days by Dr Howard. \par Patient care plan discussed\par diet and weight loss discussed \par Meds done  EKG reviewed\par COMPLIANCE WITH MEDS AND DIET REVIEWED\par follow up with cardio 2 days \par rtc 8 weeks

## 2021-05-03 NOTE — HEALTH RISK ASSESSMENT
[No] : In the past 12 months have you used drugs other than those required for medical reasons? No [No falls in past year] : Patient reported no falls in the past year [0] : 2) Feeling down, depressed, or hopeless: Not at all (0) [] : No [de-identified] : Former [YearQuit] : 2012 [[id number]] : 0

## 2021-05-04 LAB
24R-OH-CALCIDIOL SERPL-MCNC: 65.2 PG/ML
ERYTHROCYTE [SEDIMENTATION RATE] IN BLOOD BY WESTERGREN METHOD: 33 MM/HR

## 2021-05-05 ENCOUNTER — NON-APPOINTMENT (OUTPATIENT)
Age: 66
End: 2021-05-05

## 2021-05-05 ENCOUNTER — EMERGENCY (EMERGENCY)
Facility: HOSPITAL | Age: 66
LOS: 1 days | Discharge: HOME | End: 2021-05-05
Attending: EMERGENCY MEDICINE
Payer: COMMERCIAL

## 2021-05-05 ENCOUNTER — APPOINTMENT (OUTPATIENT)
Dept: CARDIOLOGY | Facility: CLINIC | Age: 66
End: 2021-05-05
Payer: MEDICARE

## 2021-05-05 VITALS
RESPIRATION RATE: 19 BRPM | SYSTOLIC BLOOD PRESSURE: 146 MMHG | OXYGEN SATURATION: 98 % | TEMPERATURE: 98 F | DIASTOLIC BLOOD PRESSURE: 89 MMHG | HEART RATE: 92 BPM

## 2021-05-05 VITALS
TEMPERATURE: 98.8 F | OXYGEN SATURATION: 96 % | HEIGHT: 67 IN | BODY MASS INDEX: 30.76 KG/M2 | WEIGHT: 196 LBS | HEART RATE: 89 BPM | SYSTOLIC BLOOD PRESSURE: 124 MMHG | DIASTOLIC BLOOD PRESSURE: 77 MMHG

## 2021-05-05 VITALS — HEIGHT: 67 IN | WEIGHT: 190.04 LBS

## 2021-05-05 LAB
ALBUMIN SERPL ELPH-MCNC: 4.1 G/DL — SIGNIFICANT CHANGE UP (ref 3.3–5.2)
ALBUMIN SERPL ELPH-MCNC: 4.6 G/DL
ALP BLD-CCNC: 110 U/L
ALP SERPL-CCNC: 97 U/L — SIGNIFICANT CHANGE UP (ref 40–120)
ALT FLD-CCNC: 42 U/L — HIGH
ALT SERPL-CCNC: 49 U/L
ANION GAP SERPL CALC-SCNC: 14 MMOL/L — SIGNIFICANT CHANGE UP (ref 5–17)
ANION GAP SERPL CALC-SCNC: 17 MMOL/L
AST SERPL-CCNC: 47 U/L — HIGH
AST SERPL-CCNC: 50 U/L
BILIRUB SERPL-MCNC: 0.7 MG/DL
BILIRUB SERPL-MCNC: 0.8 MG/DL — SIGNIFICANT CHANGE UP (ref 0.4–2)
BUN SERPL-MCNC: 15 MG/DL — SIGNIFICANT CHANGE UP (ref 8–20)
BUN SERPL-MCNC: 18 MG/DL
CALCIUM SERPL-MCNC: 10.4 MG/DL
CALCIUM SERPL-MCNC: 9.6 MG/DL — SIGNIFICANT CHANGE UP (ref 8.6–10.2)
CHLORIDE SERPL-SCNC: 101 MMOL/L — SIGNIFICANT CHANGE UP (ref 98–107)
CHLORIDE SERPL-SCNC: 98 MMOL/L
CHOLEST SERPL-MCNC: 128 MG/DL
CO2 SERPL-SCNC: 22 MMOL/L
CO2 SERPL-SCNC: 24 MMOL/L — SIGNIFICANT CHANGE UP (ref 22–29)
CREAT SERPL-MCNC: 0.66 MG/DL — SIGNIFICANT CHANGE UP (ref 0.5–1.3)
CREAT SERPL-MCNC: 0.81 MG/DL
GLUCOSE SERPL-MCNC: 173 MG/DL — HIGH (ref 70–99)
GLUCOSE SERPL-MCNC: 174 MG/DL
HCT VFR BLD CALC: 43.8 % — SIGNIFICANT CHANGE UP (ref 39–50)
HDLC SERPL-MCNC: 43 MG/DL
HGB BLD-MCNC: 15 G/DL — SIGNIFICANT CHANGE UP (ref 13–17)
LDLC SERPL CALC-MCNC: 70 MG/DL
MCHC RBC-ENTMCNC: 28.2 PG — SIGNIFICANT CHANGE UP (ref 27–34)
MCHC RBC-ENTMCNC: 34.2 GM/DL — SIGNIFICANT CHANGE UP (ref 32–36)
MCV RBC AUTO: 82.3 FL — SIGNIFICANT CHANGE UP (ref 80–100)
NONHDLC SERPL-MCNC: 85 MG/DL
PLATELET # BLD AUTO: 253 K/UL — SIGNIFICANT CHANGE UP (ref 150–400)
POTASSIUM SERPL-MCNC: 4.1 MMOL/L — SIGNIFICANT CHANGE UP (ref 3.5–5.3)
POTASSIUM SERPL-SCNC: 3.7 MMOL/L
POTASSIUM SERPL-SCNC: 4.1 MMOL/L — SIGNIFICANT CHANGE UP (ref 3.5–5.3)
PROT SERPL-MCNC: 7.8 G/DL
PROT SERPL-MCNC: 7.8 G/DL — SIGNIFICANT CHANGE UP (ref 6.6–8.7)
RBC # BLD: 5.32 M/UL — SIGNIFICANT CHANGE UP (ref 4.2–5.8)
RBC # FLD: 12.1 % — SIGNIFICANT CHANGE UP (ref 10.3–14.5)
SODIUM SERPL-SCNC: 136 MMOL/L
SODIUM SERPL-SCNC: 138 MMOL/L — SIGNIFICANT CHANGE UP (ref 135–145)
TRIGL SERPL-MCNC: 76 MG/DL
TROPONIN T SERPL-MCNC: 0.02 NG/ML — SIGNIFICANT CHANGE UP (ref 0–0.06)
TSH SERPL-ACNC: 2.17 UIU/ML
WBC # BLD: 12.26 K/UL — HIGH (ref 3.8–10.5)
WBC # FLD AUTO: 12.26 K/UL — HIGH (ref 3.8–10.5)

## 2021-05-05 PROCEDURE — 99285 EMERGENCY DEPT VISIT HI MDM: CPT

## 2021-05-05 PROCEDURE — 71045 X-RAY EXAM CHEST 1 VIEW: CPT

## 2021-05-05 PROCEDURE — 96374 THER/PROPH/DIAG INJ IV PUSH: CPT

## 2021-05-05 PROCEDURE — 99215 OFFICE O/P EST HI 40 MIN: CPT | Mod: 25

## 2021-05-05 PROCEDURE — 84484 ASSAY OF TROPONIN QUANT: CPT

## 2021-05-05 PROCEDURE — 93010 ELECTROCARDIOGRAM REPORT: CPT

## 2021-05-05 PROCEDURE — 93000 ELECTROCARDIOGRAM COMPLETE: CPT

## 2021-05-05 PROCEDURE — 99284 EMERGENCY DEPT VISIT MOD MDM: CPT | Mod: 25

## 2021-05-05 PROCEDURE — 93005 ELECTROCARDIOGRAM TRACING: CPT

## 2021-05-05 PROCEDURE — 36415 COLL VENOUS BLD VENIPUNCTURE: CPT

## 2021-05-05 PROCEDURE — 85027 COMPLETE CBC AUTOMATED: CPT

## 2021-05-05 PROCEDURE — 80053 COMPREHEN METABOLIC PANEL: CPT

## 2021-05-05 PROCEDURE — 71045 X-RAY EXAM CHEST 1 VIEW: CPT | Mod: 26

## 2021-05-05 RX ORDER — KETOROLAC TROMETHAMINE 30 MG/ML
15 SYRINGE (ML) INJECTION ONCE
Refills: 0 | Status: DISCONTINUED | OUTPATIENT
Start: 2021-05-05 | End: 2021-05-05

## 2021-05-05 RX ORDER — METHOCARBAMOL 500 MG/1
2 TABLET, FILM COATED ORAL
Qty: 18 | Refills: 0
Start: 2021-05-05 | End: 2021-05-07

## 2021-05-05 RX ORDER — METHOCARBAMOL 500 MG/1
1500 TABLET, FILM COATED ORAL ONCE
Refills: 0 | Status: COMPLETED | OUTPATIENT
Start: 2021-05-05 | End: 2021-05-05

## 2021-05-05 RX ADMIN — METHOCARBAMOL 1500 MILLIGRAM(S): 500 TABLET, FILM COATED ORAL at 19:51

## 2021-05-05 RX ADMIN — Medication 15 MILLIGRAM(S): at 19:51

## 2021-05-05 RX ADMIN — Medication 30 MILLILITER(S): at 19:51

## 2021-05-05 NOTE — ED PROVIDER NOTE - NSFOLLOWUPINSTRUCTIONS_ED_ALL_ED_FT
- Follow up with your doctor within 2-3 days.   - Bring results with you to the appointment.   - Take Tylenol (Acetaminophen) 650mg or Motrin (Ibuprofen/Advil) 600mg every 6 hours as needed for pain.   - For worsened pain take Robaxin 1500mg every 8 hours as needed.   - Return to the ED for any new or worsening symptoms.    Chest Pain    Chest pain can be caused by many different conditions which may or may not be dangerous. Causes include heartburn, lung infections, heart attack, blood clot in lungs, skin infections, strain or damage to muscle, cartilage, or bones, etc. In addition to a history and physical examination, an electrocardiogram (ECG) or other lab tests may have been performed to determine the cause of your chest pain. Follow up with your primary care provider or with a cardiologist as instructed.     SEEK IMMEDIATE MEDICAL CARE IF YOU HAVE ANY OF THE FOLLOWING SYMPTOMS: worsening chest pain, coughing up blood, unexplained back/neck/jaw pain, severe abdominal pain, dizziness or lightheadedness, fainting, shortness of breath, sweaty or clammy skin, vomiting, or racing heart beat. These symptoms may represent a serious problem that is an emergency. Do not wait to see if the symptoms will go away. Get medical help right away. Call 911 and do not drive yourself to the hospital.

## 2021-05-05 NOTE — ED PROVIDER NOTE - PROGRESS NOTE DETAILS
Natalie SOLOMON: patient feeling better. Discussed With Dr Riggs from [facility], agrees that patient can be cleared for outpatient follow up. Return precautions given.

## 2021-05-05 NOTE — ED PROVIDER NOTE - PATIENT PORTAL LINK FT
You can access the FollowMyHealth Patient Portal offered by [facility] by registering at the following website: [email or web address]. By joining [employer]’s FollowMyHealth portal, you will also be able to view your health information using other applications (apps) compatible with our system.

## 2021-05-05 NOTE — ED PROVIDER NOTE - OBJECTIVE STATEMENT
64 y/o male with PMHx of DM presents to ED c/o chest pain. Patient had 4 cardiac stents placed 5 days ago at [facility], discharged 4 days ago. Since being discharged, patient endorses non-radiating chest tightness at rest, states it feels like gas, and pain with inspiration. Patient endorses that he has been having diarrhea since the surgery, and back pain since yesterday.     Denies shortness of breath, nausea, vomiting, smoking fever, cough, runny nose, recent COVID exposure  Cardiologist: Dr. Flaquito Trevino 64 y/o male with PMHx of DM presents to ED c/o chest pain. Patient had 4 cardiac stents placed 5 days ago at [facility], discharged 4 days ago. Since being discharged, patient endorses intermittent non-radiating non exertional chest tightness at rest, states it feels like gas, and pain with palpation and inspiration. Patient endorses that he has been having diarrhea since the surgery, and mild back pain since yesterday.   Denies shortness of breath, nausea, vomiting, smoking, fever, cough, runny nose, recent COVID exposure.  Cardiologist: Dr. Flaquito Trevino

## 2021-05-05 NOTE — ED ADULT NURSE NOTE - OBJECTIVE STATEMENT
Pt AAOX3, pt c/o intermittent non-radiating non exertional chest tightness at rest, states it feels like gas, and pain with palpation and inspiration. Patient endorses that he has been having diarrhea since the surgery, and mild back pain since yesterday. pt respirations even and unlabored, pt able to move all extremities well

## 2021-05-05 NOTE — ED PROVIDER NOTE - CLINICAL SUMMARY MEDICAL DECISION MAKING FREE TEXT BOX
Patient with atypical likely msk chest wall pain, intermittent x1 week. Plan for pain control, CXR, EKG, and re-assess. Patient with atypical likely msk chest wall pain, intermittent x1 week. Plan for pain control, CXR, trop, EKG, and re-assess.

## 2021-05-06 ENCOUNTER — APPOINTMENT (OUTPATIENT)
Dept: CARE COORDINATION | Facility: HOME HEALTH | Age: 66
End: 2021-05-06
Payer: MEDICARE

## 2021-05-06 LAB
BASOPHILS # BLD AUTO: 0.04 K/UL
BASOPHILS NFR BLD AUTO: 0.4 %
EOSINOPHIL # BLD AUTO: 0.26 K/UL
EOSINOPHIL NFR BLD AUTO: 2.5 %
HCT VFR BLD CALC: 45.3 %
HGB BLD-MCNC: 15.3 G/DL
IMM GRANULOCYTES NFR BLD AUTO: 0.4 %
LYMPHOCYTES # BLD AUTO: 2.24 K/UL
LYMPHOCYTES NFR BLD AUTO: 21.4 %
MAN DIFF?: NORMAL
MCHC RBC-ENTMCNC: 28.2 PG
MCHC RBC-ENTMCNC: 33.8 GM/DL
MCV RBC AUTO: 83.6 FL
MONOCYTES # BLD AUTO: 1.19 K/UL
MONOCYTES NFR BLD AUTO: 11.3 %
NEUTROPHILS # BLD AUTO: 6.72 K/UL
NEUTROPHILS NFR BLD AUTO: 64 %
PLATELET # BLD AUTO: 263 K/UL
RBC # BLD: 5.42 M/UL
RBC # FLD: 12.8 %
WBC # FLD AUTO: 10.49 K/UL

## 2021-05-06 PROCEDURE — 99496 TRANSJ CARE MGMT HIGH F2F 7D: CPT | Mod: 95

## 2021-05-09 NOTE — DATA REVIEWED
[FreeTextEntry1] : Inpatient chart reviewed including labs, vitals, radiology reports, care coordination/consult/progress notes\par

## 2021-05-09 NOTE — REVIEW OF SYSTEMS
[Chest Pain] : chest pain [Diarrhea] : diarrhea [Anxiety] : anxiety [Negative] : Heme/Lymph [Fever] : no fever [Fatigue] : no fatigue [Chills] : no chills [Palpitations] : no palpitations [Claudication] : no  leg claudication [Lower Ext Edema] : no lower extremity edema [Orthopena] : no orthopnea [Paroxysmal Nocturnal Dyspnea] : no paroxysmal nocturnal dyspnea [Shortness Of Breath] : no shortness of breath [Wheezing] : no wheezing [Dyspnea on Exertion] : not dyspnea on exertion [Cough] : no cough [Abdominal Pain] : no abdominal pain [Nausea] : no nausea [Constipation] : no constipation [Vomiting] : no vomiting [Heartburn] : no heartburn [Melena] : no melena [Incontinence] : no incontinence [Dysuria] : no dysuria [Hematuria] : no hematuria [Frequency] : no frequency [Joint Pain] : no joint pain [Joint Stiffness] : no joint stiffness [Muscle Pain] : no muscle pain [Back Pain] : no back pain [Joint Swelling] : no joint swelling [Headache] : no headache [Dizziness] : no dizziness [Fainting] : no fainting [Confusion] : no confusion [Unsteady Walk] : no ataxia [Memory Loss] : no memory loss [Depression] : no depression

## 2021-05-09 NOTE — PHYSICAL EXAM
Consent: The patient's consent was obtained including but not limited to risks of crusting, scabbing, blistering, scarring, darker or lighter pigmentary change, recurrence, incomplete removal and infection. Duration Of Freeze Thaw-Cycle (Seconds): 0 Detail Level: Simple Post-Care Instructions: I reviewed with the patient in detail post-care instructions. Patient is to wear sunprotection, and avoid picking at any of the treated lesions. Pt may apply Vaseline to crusted or scabbing areas. Render Post-Care Instructions In Note?: no [Well Nourished] : well nourished [No Acute Distress] : no acute distress [Well Developed] : well developed [Well-Appearing] : well-appearing [Normal Voice/Communication] : normal voice/communication [PERRL] : pupils equal round and reactive to light [No JVD] : no jugular venous distention [No Lymphadenopathy] : no lymphadenopathy [No Respiratory Distress] : no respiratory distress  [No Accessory Muscle Use] : no accessory muscle use [No Edema] : there was no peripheral edema [Speech Grossly Normal] : speech grossly normal [Memory Grossly Normal] : memory grossly normal [Normal Affect] : the affect was normal [Alert and Oriented x3] : oriented to person, place, and time [Normal Mood] : the mood was normal [Normal Insight/Judgement] : insight and judgment were intact [de-identified] : no audible wheezing or visibly increased work of breathing noted [de-identified] : normal cardiac function approximated [de-identified] : i [de-identified] : wrist access site open to air c/d/i

## 2021-05-09 NOTE — HEALTH RISK ASSESSMENT
[No] : No [No falls in past year] : Patient reported no falls in the past year [0] : 2) Feeling down, depressed, or hopeless: Not at all (0) [] : No [[id number]] : 0

## 2021-05-09 NOTE — HISTORY OF PRESENT ILLNESS
[Home] : at home, [unfilled] , at the time of the visit. [Other Location: e.g. Home (Enter Location, City,State)___] : at [unfilled] [Verbal consent obtained from patient] : the patient, [unfilled] [Spouse] : spouse [FreeTextEntry1] : chest pain [de-identified] : Copied from [employer]\par ' 65M presented with complaints of chest discomfort. He reported a long-standing history of intermittent chest discomfort over may years. Most recently, the patient noted that he had more frequent chest pain which he described as sharp in quality and located mostly in the left chest. Initially he thought the pain was due to heavy lifting but he also noted the chest discomfort while walking up stairs which was associated with dyspnea and intermittent episodes of palpitations. He denied any associated lightheadedness or dizziness. He is unaware of any relieving factors except resting but noted that the chest discomfort would be recurrent with exertion. He had taken ibuprofen with mild relief. He noted a stress test a few years prior which was reported as normal. In the emergency department, the patient underwent a cardiac CT with elevated LAD calcium score. \par Now s/p left heart catheterization via right radial approach with Dr. Bloom: S/P KEV to mLAD HOLA 3.0 x 12mm, KEV-D1 HOLA 2.25 x 8mm, HOLA 2.5 x 8mm, HOLA 3.0x 12mm\par Pt tolerated procedure well.' \par \par Pt contacted by TCM RN in 24hr post discharge. discharge instruction reviewed, medication reconciliation completed. on 5/5 pt followed up with Cardiologist, at where pt was advised that it possibly is related to GI. Then pt presented to ED early evening with chest pain. cardiac work-up all negative, pt was given maalox, Toradol 15mg IV and Robaxin 1500mg discharged home. When followed up this morning wife reported that pt slept well first time in weeks, then reported slight pain coming back around 5am. Pt is having difficulty describe the location, or quality of pain. Pt continues having watery BM. \par Pt was seen via telehealth with wife Bita. Pt started taking Robaxin, rating 3-4/10 pain, diarrhea started in the hospital. Pt denies IBS, but has loose BMs in the past when being stressed out.  Pt still has intermittent mild pain in chest. Denies SOB, CP or dizziness.

## 2021-05-09 NOTE — ASSESSMENT
[FreeTextEntry1] : Pt reporting that he does have anxiety around recent hospitalization and long standing history of intermittent chest pain. Pt reported that he took Gas-x earlier today, but not as helpful as when he took Maalox  and Robaxin in the hospital, reporting intermittent pain at epigastric area \par Pt never was diagnosed with GERD or IBS. Advised to follow up with PCP for if diarrhea persists.\par \par [] chest pain\par - successful Left heart cath. no bruise. highly likely musculoskeletal related\par - Continue Robaxin 1500mg Q8hrs PRN\par - Continue Tylenol 1000mg Q8hrs for pain\par \par [] multivessel CAD\par - s/p LHC\par - GEF86ty, Lipitor 80mg, Brilinta 90mg BID\par - tight BG control.\par - low fat low salt DASH diet\par \par \par [] Epigastic pain and diarrhea\par - GERD and ?IBS\par - START Pantoprazole 40mg QD\par - Follow up with PCP for further workup\par \par \par [] HTN\par - continue amlodipine 5mg QD, Lopressor 37.5mg BID\par - monitor BP daily prior to taking BP meds \par \par [] DMII\par - Daily BG monitor\par - Continue Januvia 100mg QD, Metformin 1000mg BID (resumed 5/3 post cath)\par - Follow up with Dr. Salinas\par \par Reviewed all medications at length with patient, All questions addressed. Worsening symptoms discussed with pt understanding. No issues or concerns at this time. Pt encouraged to call CCC/JAYSHREE at [phone] w/any questions, concerns or issues. Will continue to follow up with patient status\par \par

## 2021-05-09 NOTE — COUNSELING
[Fall prevention counseling provided] : Fall prevention counseling provided [Adequate lighting] : Adequate lighting [No throw rugs] : No throw rugs [Use proper foot wear] : Use proper foot wear [Use recommended devices] : Use recommended devices [Good understanding] : Patient has a good understanding of lifestyle changes and steps needed to achieve self management goal [de-identified] : .

## 2021-06-02 ENCOUNTER — NON-APPOINTMENT (OUTPATIENT)
Age: 66
End: 2021-06-02

## 2021-06-08 ENCOUNTER — APPOINTMENT (OUTPATIENT)
Dept: CARDIOLOGY | Facility: CLINIC | Age: 66
End: 2021-06-08
Payer: MEDICARE

## 2021-06-08 PROCEDURE — 78452 HT MUSCLE IMAGE SPECT MULT: CPT

## 2021-06-08 PROCEDURE — 93306 TTE W/DOPPLER COMPLETE: CPT

## 2021-06-08 PROCEDURE — 99072 ADDL SUPL MATRL&STAF TM PHE: CPT

## 2021-06-08 PROCEDURE — 93015 CV STRESS TEST SUPVJ I&R: CPT

## 2021-06-08 PROCEDURE — A9500: CPT

## 2021-06-16 ENCOUNTER — NON-APPOINTMENT (OUTPATIENT)
Age: 66
End: 2021-06-16

## 2021-06-16 ENCOUNTER — APPOINTMENT (OUTPATIENT)
Dept: CARDIOLOGY | Facility: CLINIC | Age: 66
End: 2021-06-16
Payer: MEDICARE

## 2021-06-16 VITALS
BODY MASS INDEX: 31.08 KG/M2 | HEIGHT: 67 IN | SYSTOLIC BLOOD PRESSURE: 121 MMHG | HEART RATE: 86 BPM | OXYGEN SATURATION: 97 % | TEMPERATURE: 97.7 F | WEIGHT: 198 LBS | DIASTOLIC BLOOD PRESSURE: 76 MMHG

## 2021-06-16 PROCEDURE — 93000 ELECTROCARDIOGRAM COMPLETE: CPT

## 2021-06-16 PROCEDURE — 99213 OFFICE O/P EST LOW 20 MIN: CPT

## 2021-06-16 NOTE — HISTORY OF PRESENT ILLNESS
[FreeTextEntry1] : 67 yo man, , father of one son. No prior history of heart disease. C/O chest pain since 1995 that has been evaluated in the past, atypical, constant, pressure like, associated with SOB, especially when moving the left arm. Was treated with NTG for a period (2000) with no response. \par In May 2018 underwent a nuclear stress test (8 METS) and there was no evidence of ischemia and echocardiogram revealed a normal LV function (55%) with mild dilatation of the aortic root. Not clear what tests he had done in the past.\par Lost to follow up. In 4/30/2021 he underwent PCI to the LAD/Diag with KEV x 4. LV function was normal.\par Still C/O effort related chest pain, mostly when moving the arms or lifting heavy objects. \par Echo done on 6/13/2021 revealed normal LV function. \par Stress test 6/2021 8 METS, normal LV functiion. No ischemia.  \par Still C/O chest pain, associated with any type of movement of the chest or by simply touching the chest/ribs, or with deep inspiration, radiates to the back. Worse also when he climbs stairs. Denies palpitations, dizziness or ankle swelling. No orthopnea or PND. \par Has DM since 2013 that is treated with meds. On oral meds. Last NaI1o=2.3%\par Smoked for 30 years and stopped in 2013 \par Used to drink ETOH in the past. States that stopped in Jan 2021\david Has not received the vaccine

## 2021-06-16 NOTE — ASSESSMENT
[FreeTextEntry1] : ECG performed today at the clinic revealed a NSR 80 BPM, normal AQRS, AK, QRS and QTc

## 2021-06-16 NOTE — DISCUSSION/SUMMARY
[FreeTextEntry1] : Mr. ADÁN SHABAZZ is a 66 year male with known CAD. Underwent PCI to the LAD and Diag. Since then still C/O atypical chest pain, at the Lt costocondral articulation. Stress test and echo were WNL.\par I have recommended to continue the same medications.\par I also recommended lifestyle modification with weight loss and exercise.\par We reviewed all his blood work, NST and echo and discussed these results with him.\par Recommended to receive the COVID 19 vaccine. He is skeptical. \par Routine follow up in 6 months\par

## 2021-06-28 ENCOUNTER — APPOINTMENT (OUTPATIENT)
Dept: FAMILY MEDICINE | Facility: CLINIC | Age: 66
End: 2021-06-28
Payer: MEDICARE

## 2021-06-28 VITALS
HEIGHT: 67 IN | HEART RATE: 79 BPM | BODY MASS INDEX: 31.23 KG/M2 | WEIGHT: 199 LBS | TEMPERATURE: 98.2 F | DIASTOLIC BLOOD PRESSURE: 70 MMHG | RESPIRATION RATE: 12 BRPM | OXYGEN SATURATION: 98 % | SYSTOLIC BLOOD PRESSURE: 124 MMHG

## 2021-06-28 DIAGNOSIS — Z95.5 PRESENCE OF CORONARY ANGIOPLASTY IMPLANT AND GRAFT: ICD-10-CM

## 2021-06-28 DIAGNOSIS — Z91.19 PATIENT'S NONCOMPLIANCE WITH OTHER MEDICAL TREATMENT AND REGIMEN: ICD-10-CM

## 2021-06-28 DIAGNOSIS — Z00.00 ENCOUNTER FOR GENERAL ADULT MEDICAL EXAMINATION W/OUT ABNORMAL FINDINGS: ICD-10-CM

## 2021-06-28 LAB — HCV RNA SERPL NAA DL=5-ACNC: NON REACTIVE

## 2021-06-28 PROCEDURE — 99214 OFFICE O/P EST MOD 30 MIN: CPT | Mod: 25

## 2021-06-28 PROCEDURE — 83036 HEMOGLOBIN GLYCOSYLATED A1C: CPT | Mod: QW

## 2021-06-28 NOTE — HISTORY OF PRESENT ILLNESS
[FreeTextEntry1] : Patient with a PMHx of DM2, BPH, chronic low back pain presents for f/u of diabetes management, last labs were on 5/3/21.  Failed stress Test was in hospital had 4 stents placed in LAD has been F/U with cardio. Pt states feels well at visit today. \par

## 2021-06-28 NOTE — HEALTH RISK ASSESSMENT
[No] : In the past 12 months have you used drugs other than those required for medical reasons? No [No falls in past year] : Patient reported no falls in the past year [0] : 2) Feeling down, depressed, or hopeless: Not at all (0) [] : No [de-identified] : Former [YearQuit] : 2012 [[id number]] : 0

## 2021-06-28 NOTE — ASSESSMENT
[FreeTextEntry1] : 65 yo M presents to clinic for a follow up. Pt states he had a stent placed 6 weeks ago. Pt c/o SOB for the past 6 weeks when he lays down, sleeps, bends down, or walks down the stairs. Pt states he has been sleeping with 2 pillows and also c/o neck pain. Pt tried taking OTC Motrin (2 tablets) w/ some relief of symptoms. Pt states he saw the Cardiologist 3 weeks ago and had a stress test with chest tightness. Pt is a former smoker (quit 7-8 yrs ago, used to smoke 1 pack per week). Pt is requesting a COVID-19 PCR test to make sure he is okay - he states he gets tested monthly. Pt denies fever, chills, headache, chest pain, palpitations, dizziness, N/V/D. \par Patient care plan discussed\par diet and weight loss discussed \par Meds done \par COMPLIANCE WITH MEDS AND DIET REVIEWED\par follow up with cardiologist 2 days \par rtc 8 weeks \par Alc 7.5, reviewed with pt

## 2021-06-28 NOTE — CURRENT MEDS
Detail Level: Detailed Prep Text (Optional): Prior to removal the treatment areas were prepped in the usual fashion. Consent was obtained and risks were reviewed including but not limited to scarring, infection, bleeding, scabbing, incomplete removal, and allergy to anesthesia. Extraction Method: 18 gauge needle, cotton-tipped applicators and gentle lateral pressure Render Number Of Lesions Treated: yes Post-Care Instructions: I reviewed with the patient in detail post-care instructions. Patient is to keep the treatment areas dry overnight, and then apply bacitracin twice daily until healed. Patient may apply hydrogen peroxide soaks to remove any crusting. Acne Type: Comedonal Lesions Render Post-Care Instructions In Note?: no [Takes medication as prescribed] : takes [None] : Patient does not have any barriers to medication adherence

## 2021-06-28 NOTE — PHYSICAL EXAM
[Well Nourished] : well nourished [Normal Sclera/Conjunctiva] : normal sclera/conjunctiva [PERRL] : pupils equal round and reactive to light [EOMI] : extraocular movements intact [Normal Outer Ear/Nose] : the outer ears and nose were normal in appearance [Normal Oropharynx] : the oropharynx was normal [No JVD] : no jugular venous distention [No Lymphadenopathy] : no lymphadenopathy [Supple] : supple [Thyroid Normal, No Nodules] : the thyroid was normal and there were no nodules present [No Respiratory Distress] : no respiratory distress  [No Accessory Muscle Use] : no accessory muscle use [Clear to Auscultation] : lungs were clear to auscultation bilaterally [Normal Rate] : normal rate  [Regular Rhythm] : with a regular rhythm [Normal S1, S2] : normal S1 and S2 [No Murmur] : no murmur heard [No Carotid Bruits] : no carotid bruits [No Abdominal Bruit] : a ~M bruit was not heard ~T in the abdomen [No Varicosities] : no varicosities [Pedal Pulses Present] : the pedal pulses are present [No Edema] : there was no peripheral edema [No Palpable Aorta] : no palpable aorta [No Extremity Clubbing/Cyanosis] : no extremity clubbing/cyanosis [Soft] : abdomen soft [Non Tender] : non-tender [Non-distended] : non-distended [No Masses] : no abdominal mass palpated [No HSM] : no HSM [Normal Bowel Sounds] : normal bowel sounds [Normal Posterior Cervical Nodes] : no posterior cervical lymphadenopathy [Normal Anterior Cervical Nodes] : no anterior cervical lymphadenopathy [No CVA Tenderness] : no CVA  tenderness [No Spinal Tenderness] : no spinal tenderness [No Joint Swelling] : no joint swelling [Grossly Normal Strength/Tone] : grossly normal strength/tone [No Rash] : no rash [Coordination Grossly Intact] : coordination grossly intact [No Focal Deficits] : no focal deficits [Normal Gait] : normal gait [Deep Tendon Reflexes (DTR)] : deep tendon reflexes were 2+ and symmetric [Normal Affect] : the affect was normal [Normal Insight/Judgement] : insight and judgment were intact [Normal] : affect was normal and insight and judgment were intact [de-identified] : obese [de-identified] : Dyspnea is experienced with chest pain [de-identified] : ankle edema [de-identified] : Experiences chest pain at rest, chest wall tenderness elicited

## 2021-06-30 LAB
ALBUMIN SERPL ELPH-MCNC: 3.9 G/DL
ALP BLD-CCNC: 125 U/L
ALT SERPL-CCNC: 30 U/L
ANION GAP SERPL CALC-SCNC: 11 MMOL/L
AST SERPL-CCNC: 31 U/L
BILIRUB SERPL-MCNC: 0.5 MG/DL
BUN SERPL-MCNC: 13 MG/DL
CALCIUM SERPL-MCNC: 8.8 MG/DL
CHLORIDE SERPL-SCNC: 101 MMOL/L
CHOLEST SERPL-MCNC: 90 MG/DL
CO2 SERPL-SCNC: 25 MMOL/L
CREAT SERPL-MCNC: 0.72 MG/DL
ERYTHROCYTE [SEDIMENTATION RATE] IN BLOOD BY WESTERGREN METHOD: 16 MM/HR
GLUCOSE SERPL-MCNC: 265 MG/DL
HDLC SERPL-MCNC: 41 MG/DL
LDLC SERPL CALC-MCNC: 34 MG/DL
NONHDLC SERPL-MCNC: 49 MG/DL
POTASSIUM SERPL-SCNC: 4.1 MMOL/L
PROT SERPL-MCNC: 7 G/DL
SARS-COV-2 N GENE NPH QL NAA+PROBE: NOT DETECTED
SODIUM SERPL-SCNC: 137 MMOL/L
TRIGL SERPL-MCNC: 74 MG/DL
TSH SERPL-ACNC: 1.17 UIU/ML

## 2021-07-12 LAB — GLUCOSE BLDC GLUCOMTR-MCNC: 232 MG/DL — HIGH (ref 70–99)

## 2021-07-13 LAB
BASOPHILS # BLD AUTO: 0.04 K/UL
BASOPHILS NFR BLD AUTO: 0.5 %
EOSINOPHIL # BLD AUTO: 0.13 K/UL
EOSINOPHIL NFR BLD AUTO: 1.7 %
HCT VFR BLD CALC: 37.4 %
HGB BLD-MCNC: 12 G/DL
IMM GRANULOCYTES NFR BLD AUTO: 0.4 %
LYMPHOCYTES # BLD AUTO: 1.31 K/UL
LYMPHOCYTES NFR BLD AUTO: 16.8 %
MAN DIFF?: NORMAL
MCHC RBC-ENTMCNC: 28 PG
MCHC RBC-ENTMCNC: 32.1 GM/DL
MCV RBC AUTO: 87.2 FL
MONOCYTES # BLD AUTO: 0.52 K/UL
MONOCYTES NFR BLD AUTO: 6.7 %
NEUTROPHILS # BLD AUTO: 5.78 K/UL
NEUTROPHILS NFR BLD AUTO: 73.9 %
PLATELET # BLD AUTO: 221 K/UL
RBC # BLD: 4.29 M/UL
RBC # FLD: 14.5 %
WBC # FLD AUTO: 7.81 K/UL

## 2021-07-21 ENCOUNTER — EMERGENCY (EMERGENCY)
Facility: HOSPITAL | Age: 66
LOS: 1 days | Discharge: HOME | End: 2021-07-21
Attending: EMERGENCY MEDICINE
Payer: COMMERCIAL

## 2021-07-21 VITALS
RESPIRATION RATE: 19 BRPM | OXYGEN SATURATION: 98 % | SYSTOLIC BLOOD PRESSURE: 139 MMHG | TEMPERATURE: 98 F | DIASTOLIC BLOOD PRESSURE: 77 MMHG | HEART RATE: 86 BPM

## 2021-07-21 VITALS — WEIGHT: 199.96 LBS | HEIGHT: 67 IN

## 2021-07-21 LAB
ALBUMIN SERPL ELPH-MCNC: 3.9 G/DL — SIGNIFICANT CHANGE UP (ref 3.3–5.2)
ALP SERPL-CCNC: 126 U/L — HIGH (ref 40–120)
ALT FLD-CCNC: 24 U/L — SIGNIFICANT CHANGE UP
ANION GAP SERPL CALC-SCNC: 12 MMOL/L — SIGNIFICANT CHANGE UP (ref 5–17)
APPEARANCE UR: CLEAR — SIGNIFICANT CHANGE UP
AST SERPL-CCNC: 30 U/L — SIGNIFICANT CHANGE UP
BASOPHILS # BLD AUTO: 0.04 K/UL — SIGNIFICANT CHANGE UP (ref 0–0.2)
BASOPHILS NFR BLD AUTO: 0.4 % — SIGNIFICANT CHANGE UP (ref 0–2)
BILIRUB SERPL-MCNC: 0.7 MG/DL — SIGNIFICANT CHANGE UP (ref 0.4–2)
BILIRUB UR-MCNC: NEGATIVE — SIGNIFICANT CHANGE UP
BUN SERPL-MCNC: 9.8 MG/DL — SIGNIFICANT CHANGE UP (ref 8–20)
CALCIUM SERPL-MCNC: 9.6 MG/DL — SIGNIFICANT CHANGE UP (ref 8.6–10.2)
CHLORIDE SERPL-SCNC: 103 MMOL/L — SIGNIFICANT CHANGE UP (ref 98–107)
CO2 SERPL-SCNC: 25 MMOL/L — SIGNIFICANT CHANGE UP (ref 22–29)
COLOR SPEC: YELLOW — SIGNIFICANT CHANGE UP
CREAT SERPL-MCNC: 0.69 MG/DL — SIGNIFICANT CHANGE UP (ref 0.5–1.3)
DIFF PNL FLD: NEGATIVE — SIGNIFICANT CHANGE UP
EOSINOPHIL # BLD AUTO: 0.16 K/UL — SIGNIFICANT CHANGE UP (ref 0–0.5)
EOSINOPHIL NFR BLD AUTO: 1.5 % — SIGNIFICANT CHANGE UP (ref 0–6)
GLUCOSE SERPL-MCNC: 93 MG/DL — SIGNIFICANT CHANGE UP (ref 70–99)
GLUCOSE UR QL: NEGATIVE MG/DL — SIGNIFICANT CHANGE UP
HCT VFR BLD CALC: 37.6 % — LOW (ref 39–50)
HGB BLD-MCNC: 12.4 G/DL — LOW (ref 13–17)
IMM GRANULOCYTES NFR BLD AUTO: 0.3 % — SIGNIFICANT CHANGE UP (ref 0–1.5)
KETONES UR-MCNC: NEGATIVE — SIGNIFICANT CHANGE UP
LEUKOCYTE ESTERASE UR-ACNC: NEGATIVE — SIGNIFICANT CHANGE UP
LIDOCAIN IGE QN: 16 U/L — LOW (ref 22–51)
LYMPHOCYTES # BLD AUTO: 1.53 K/UL — SIGNIFICANT CHANGE UP (ref 1–3.3)
LYMPHOCYTES # BLD AUTO: 14.7 % — SIGNIFICANT CHANGE UP (ref 13–44)
MAGNESIUM SERPL-MCNC: 1.6 MG/DL — SIGNIFICANT CHANGE UP (ref 1.6–2.6)
MCHC RBC-ENTMCNC: 27.9 PG — SIGNIFICANT CHANGE UP (ref 27–34)
MCHC RBC-ENTMCNC: 33 GM/DL — SIGNIFICANT CHANGE UP (ref 32–36)
MCV RBC AUTO: 84.7 FL — SIGNIFICANT CHANGE UP (ref 80–100)
MONOCYTES # BLD AUTO: 0.87 K/UL — SIGNIFICANT CHANGE UP (ref 0–0.9)
MONOCYTES NFR BLD AUTO: 8.4 % — SIGNIFICANT CHANGE UP (ref 2–14)
NEUTROPHILS # BLD AUTO: 7.77 K/UL — HIGH (ref 1.8–7.4)
NEUTROPHILS NFR BLD AUTO: 74.7 % — SIGNIFICANT CHANGE UP (ref 43–77)
NITRITE UR-MCNC: NEGATIVE — SIGNIFICANT CHANGE UP
NT-PROBNP SERPL-SCNC: 129 PG/ML — SIGNIFICANT CHANGE UP (ref 0–300)
PH UR: 6 — SIGNIFICANT CHANGE UP (ref 5–8)
PLATELET # BLD AUTO: 265 K/UL — SIGNIFICANT CHANGE UP (ref 150–400)
POTASSIUM SERPL-MCNC: 4 MMOL/L — SIGNIFICANT CHANGE UP (ref 3.5–5.3)
POTASSIUM SERPL-SCNC: 4 MMOL/L — SIGNIFICANT CHANGE UP (ref 3.5–5.3)
PROT SERPL-MCNC: 7.8 G/DL — SIGNIFICANT CHANGE UP (ref 6.6–8.7)
PROT UR-MCNC: NEGATIVE MG/DL — SIGNIFICANT CHANGE UP
RBC # BLD: 4.44 M/UL — SIGNIFICANT CHANGE UP (ref 4.2–5.8)
RBC # FLD: 13.7 % — SIGNIFICANT CHANGE UP (ref 10.3–14.5)
SARS-COV-2 RNA SPEC QL NAA+PROBE: SIGNIFICANT CHANGE UP
SODIUM SERPL-SCNC: 140 MMOL/L — SIGNIFICANT CHANGE UP (ref 135–145)
SP GR SPEC: 1 — LOW (ref 1.01–1.02)
TROPONIN T SERPL-MCNC: <0.01 NG/ML — SIGNIFICANT CHANGE UP (ref 0–0.06)
TROPONIN T SERPL-MCNC: <0.01 NG/ML — SIGNIFICANT CHANGE UP (ref 0–0.06)
UROBILINOGEN FLD QL: NEGATIVE MG/DL — SIGNIFICANT CHANGE UP
WBC # BLD: 10.4 K/UL — SIGNIFICANT CHANGE UP (ref 3.8–10.5)
WBC # FLD AUTO: 10.4 K/UL — SIGNIFICANT CHANGE UP (ref 3.8–10.5)

## 2021-07-21 PROCEDURE — 93010 ELECTROCARDIOGRAM REPORT: CPT

## 2021-07-21 PROCEDURE — 85025 COMPLETE CBC W/AUTO DIFF WBC: CPT

## 2021-07-21 PROCEDURE — 71045 X-RAY EXAM CHEST 1 VIEW: CPT | Mod: 26

## 2021-07-21 PROCEDURE — 83880 ASSAY OF NATRIURETIC PEPTIDE: CPT

## 2021-07-21 PROCEDURE — 99284 EMERGENCY DEPT VISIT MOD MDM: CPT | Mod: 25

## 2021-07-21 PROCEDURE — 71045 X-RAY EXAM CHEST 1 VIEW: CPT

## 2021-07-21 PROCEDURE — U0003: CPT

## 2021-07-21 PROCEDURE — 83735 ASSAY OF MAGNESIUM: CPT

## 2021-07-21 PROCEDURE — 84484 ASSAY OF TROPONIN QUANT: CPT

## 2021-07-21 PROCEDURE — 87086 URINE CULTURE/COLONY COUNT: CPT

## 2021-07-21 PROCEDURE — 83690 ASSAY OF LIPASE: CPT

## 2021-07-21 PROCEDURE — 81003 URINALYSIS AUTO W/O SCOPE: CPT

## 2021-07-21 PROCEDURE — 36415 COLL VENOUS BLD VENIPUNCTURE: CPT

## 2021-07-21 PROCEDURE — 99285 EMERGENCY DEPT VISIT HI MDM: CPT

## 2021-07-21 PROCEDURE — 80053 COMPREHEN METABOLIC PANEL: CPT

## 2021-07-21 PROCEDURE — U0005: CPT

## 2021-07-21 PROCEDURE — 99283 EMERGENCY DEPT VISIT LOW MDM: CPT

## 2021-07-21 PROCEDURE — 93005 ELECTROCARDIOGRAM TRACING: CPT

## 2021-07-21 RX ORDER — BNT162B2 0.23 MG/2.25ML
0.3 INJECTION, SUSPENSION INTRAMUSCULAR ONCE
Refills: 0 | Status: DISCONTINUED | OUTPATIENT
Start: 2021-07-21 | End: 2021-07-21

## 2021-07-21 RX ORDER — TICAGRELOR 90 MG/1
0 TABLET ORAL
Qty: 0 | Refills: 0 | DISCHARGE

## 2021-07-21 NOTE — ED PROVIDER NOTE - PHYSICAL EXAMINATION
General:     NAD  Head:     NC/AT, EOMI, oral mucosa moist  Neck:     trachea midline  Lungs:     CTA b/l, no w/r/r  CVS:     S1S2, RRR, no m/g/r  Abd:     +BS, s/nt/nd, no organomegaly  Ext:    2+ radial and pedal pulses, no c/c/e  Neuro: AAOx3, no sensory/motor deficits

## 2021-07-21 NOTE — CONSULT NOTE ADULT - ATTENDING COMMENTS
66M with chronic midsternal chest pain for few years incidentally found with CAD s/p KEV in 4/2021 continues to have persistent chest pain despite PCI, had outpatient exercise nuclear stress done in 6/2021 without chest pain on exertion, no ischemia, reports adherent with meds including DAPT, now p/w the same chest discomfort and also back pain for few days, EKG and Troponin negative, UA no blood to suggest kidney stone, suspect noncardiac etiology of the pain  -recommend outpatient GI eval. for EGD, add omeprazole 40mg empirically  -trial of lidocaine patch for back pain  -continue outpatient cardiology follow up with Dr. Wolf Willson DO, [facility]  Faculty Non-Invasive Cardiologist  [phone]

## 2021-07-21 NOTE — ED PROVIDER NOTE - CLINICAL SUMMARY MEDICAL DECISION MAKING FREE TEXT BOX
patient with chest painx 3 days, atypical, seen by cardiology who recommends trop and f/up in office if normal. will dc home with return precautions.

## 2021-07-21 NOTE — CONSULT NOTE ADULT - ASSESSMENT
Pt is a 67 y/o male with medical history of DM, CAD s/p PCI mLAD-75% stenosis KEV X2, prox. Diag-90% KEV X2, who presents to [facility]-ED for chest pain.  Pt has been experiencing chest pain even after angiogram 4/2021. Pt has completed NST and echo in outaptient cardiology office to address complaint of chest pain-both were normal, no evidence of new ischemia. Pt called cardiology office today complaining of chest pain radiating down left arm, and back, sharp. Pt states that chest pain is worse with movement and palpation. Pt also complaining of right flank pain. In ED, ECG-NSR HR @ 80, no ischemic changes, Trop#1-0.02.    Chest Pain  -ECG-NSR HR @ 80, no ischemic changes  -Trop#1-0.02  -Echo-6/2021- EF 55-60%, grade I diastolic dysfx. , sclerotic AV with normal opening  -NST- 6/2021- Exercise stress test 8 mets, normal study  -Cath 4/2021- PCI to mLAD and D1  -Likely not cardiac in nature since its reproducible   -In presence of recent normal ischemic workup as outpatient, negative trop and no ischemic changes on ECG pt may be DC'd from cardiology perspective with outpatient follow up  -Evaluation right flank pain recommended

## 2021-07-21 NOTE — CONSULT NOTE ADULT - SUBJECTIVE AND OBJECTIVE BOX
[place] [facility]                                                               Office:  [address] [place] [address]                                                              Telephone: [phone]. Fax:[phone]                                                                        CARDIOLOGY CONSULTATION NOTE                                                                                             Consult requested by:    Reason for Consultation: Chest Pain  PMD: Fátima  Primary Cardiology: Wolf  History obtained by: Patient and medical record   obtained: No    Chief complaint:    Patient is a 66y old  Male who presents with a chief complaint of chest pain      HPI: Pt is a 67 y/o male with medical history of DM, CAD s/p PCI mLAD-75% stenosis KEV X2, prox. Diag-90% KEV X2, who presents to [facility]-ED for chest pain.          REVIEW OF SYMPTOMS:     CONSTITUTIONAL: No fever, weight loss, or fatigue  ENMT:  No difficulty hearing, tinnitus, vertigo; No sinus or throat pain  NECK: No pain or stiffness  CARDIOVASCULAR: See HPI  RESPIRATORY: No Dyspnea on exertion, Shortness of breath, cough, wheezing  : No dysuria, no hematuria   GI: No dark color stool, no melena, no diarrhea, no constipation, no abdominal pain   NEURO: No headache, no dizziness, no slurred speech   MUSCULOSKELETAL: No joint pain or swelling; No muscle, back, or extremity pain  PSYCH: No agitation, no anxiety.    ALL OTHER REVIEW OF SYSTEMS ARE NEGATIVE.      PREVIOUS DIAGNOSTIC TESTING      CATHETERIZATION FINDINGS: < from: Cardiac Cath Lab - Adult (04.30.21 @ 16:23) >  CORONARY VESSELS: The coronary circulation is left dominant.  LM:   --  LM: Normal. The vessel was normal sized, not calcified, and not  tortuous. Angiography showed no evidence of disease.  LAD:   --  LAD: The vessel was normal sized, moderately calcified, and  mildly tortuous. Angiography showed multiple discrete lesions.  --  Mid LAD: There was a diffuse 75 % stenosis in the middle third of the  vessel segment.The lesion was without evidence of thrombus. There was  WALESKA grade 3 flow through the vessel (brisk flow). It appears amenable to  percutaneous intervention.  --  D1: The vessel was normal sized, mildly calcified, and mildly tortuous.  Angiography showed multiple discrete lesions. There was a diffuse 90 %  stenosis in the proximal third of the vessel segment. The lesion was  without evidence of thrombus. There was WALESKA grade 3 flow through the  vessel (brisk flow). This lesion is a likely culpritfor the patient's  anginal symptoms. It appears amenable to percutaneous intervention.  CX:   --  Circumflex: The vessel was large sized (dominant), not calcified,  and not tortuous. Angiography showed minor luminal irregularities with no  flow limiting lesions.  RCA:   --  RCA: The vessel was small (non-dominant), not calcified, and not  tortuous. Angiography showed minor luminal irregularities with no flow  limiting lesions.  COMPLICATIONS: No complications occurred during the cath lab visit.  DIAGNOSTIC IMPRESSIONS: There is significant single vessel coronary artery  disease.  Mid LAD=75%  Prox Diag=90%  Successful PCI of Mid LAD with KEV x 2 ([place] 3.0x12 mm x 2)  Successful PCI of Prox Diag 1 with KEV x 2 (Luis Felipe 2.25x8 and 2.5x8mm) Left  ventricular function is normal.  LVEF=60%      ALLERGIES: Allergies    No Known Allergies    Intolerances      PAST MEDICAL HISTORY  No pertinent past medical history    DM (diabetes mellitus)        PAST SURGICAL HISTORY  No significant past surgical history        FAMILY HISTORY:      SOCIAL HISTORY:   CIGARETTES:  quit 10 years ago, smoked since age 15   ALCOHOL: Occasional  DRUGS: Denies      CURRENT MEDICATIONS:           HOME MEDICATIONS:    Aspirin Enteric Coated 81 mg oral delayed release tablet: 1 tab(s) orally once a day (29 Apr 2021 18:51)  Januvia 100 mg oral tablet: 1 tab(s) orally once a day (29 Apr 2021 18:51)  metFORMIN 1000 mg oral tablet: 1 tab(s) orally 2 times a day  Resume on Monday 5/3/2021 (01 May 2021 08:58)  pioglitazone 15 mg oral tablet: 1 tab(s) orally once a day (29 Apr 2021 18:51)      Vital Signs Last 24 Hrs  T(C): --  T(F): --  HR: --  BP: --  BP(mean): --  RR: --  SpO2: --      PHYSICAL EXAM:  Constitutional: Comfortable . No acute distress.   HEENT: Atraumatic and normocephalic , neck is supple . no JVD. No carotid bruit. PEERL   CNS: A&Ox3. No focal deficits. EOMI.   Lymph Nodes: Cervical : Not palpable.  Respiratory: CTAB  Cardiovascular: S1S2 RRR. No murmur/rubs or gallop.  Gastrointestinal: Soft non-tender and non distended . +Bowel sounds. negative El's sign.  Extremities: No edema.   Psychiatric: Calm . no agitation.  Skin: No skin rash/ulcers visualized to face, hands or feet.    Intake and output:     LABS:            ;p-BNP=        INTERPRETATION OF TELEMETRY:   ECG:  NSR HR @ 80 , no ischemic changes     RADIOLOGY & ADDITIONAL STUDIES:    X-ray:     CT scan:   MRI:                                                                          [place] [facility]                                                               Office:  [address] [place] [address]                                                              Telephone: [phone]. Fax:[phone]                                                                        CARDIOLOGY CONSULTATION NOTE                                                                                             Consult requested by:  Dr. Rudolph  Reason for Consultation: Chest Pain  PMD: Fátima  Primary Cardiology: Wolf  History obtained by: Patient and medical record   obtained: No    Chief complaint:    Patient is a 66y old  Male who presents with a chief complaint of chest pain      HPI: Pt is a 67 y/o male with medical history of DM, CAD s/p PCI mLAD-75% stenosis KEV X2, prox. Diag-90% KEV X2, who presents to [facility]ED for chest pain.  Pt has been experiencing chest pain even after angiogram 4/2021.         REVIEW OF SYMPTOMS:     CONSTITUTIONAL: No fever, weight loss, or fatigue  ENMT:  No difficulty hearing, tinnitus, vertigo; No sinus or throat pain  NECK: No pain or stiffness  CARDIOVASCULAR: See HPI  RESPIRATORY: No Dyspnea on exertion, Shortness of breath, cough, wheezing  : No dysuria, no hematuria   GI: No dark color stool, no melena, no diarrhea, no constipation, no abdominal pain   NEURO: No headache, no dizziness, no slurred speech   MUSCULOSKELETAL: No joint pain or swelling; No muscle, back, or extremity pain  PSYCH: No agitation, no anxiety.    ALL OTHER REVIEW OF SYSTEMS ARE NEGATIVE.      PREVIOUS DIAGNOSTIC TESTING      CATHETERIZATION FINDINGS: < from: Cardiac Cath Lab - Adult (04.30.21 @ 16:23) >  CORONARY VESSELS: The coronary circulation is left dominant.  LM:   --  LM: Normal. The vessel was normal sized, not calcified, and not  tortuous. Angiography showed no evidence of disease.  LAD:   --  LAD: The vessel was normal sized, moderately calcified, and  mildly tortuous. Angiography showed multiple discrete lesions.  --  Mid LAD: There was a diffuse 75 % stenosis in the middle third of the  vessel segment.The lesion was without evidence of thrombus. There was  WALESKA grade 3 flow through the vessel (brisk flow). It appears amenable to  percutaneous intervention.  --  D1: The vessel was normal sized, mildly calcified, and mildly tortuous.  Angiography showed multiple discrete lesions. There was a diffuse 90 %  stenosis in the proximal third of the vessel segment. The lesion was  without evidence of thrombus. There was WALESKA grade 3 flow through the  vessel (brisk flow). This lesion is a likely culpritfor the patient's  anginal symptoms. It appears amenable to percutaneous intervention.  CX:   --  Circumflex: The vessel was large sized (dominant), not calcified,  and not tortuous. Angiography showed minor luminal irregularities with no  flow limiting lesions.  RCA:   --  RCA: The vessel was small (non-dominant), not calcified, and not  tortuous. Angiography showed minor luminal irregularities with no flow  limiting lesions.  COMPLICATIONS: No complications occurred during the cath lab visit.  DIAGNOSTIC IMPRESSIONS: There is significant single vessel coronary artery  disease.  Mid LAD=75%  Prox Diag=90%  Successful PCI of Mid LAD with KEV x 2 ([place] 3.0x12 mm x 2)  Successful PCI of Prox Diag 1 with KEV x 2 (Luis Felipe 2.25x8 and 2.5x8mm) Left  ventricular function is normal.  LVEF=60%      ALLERGIES: Allergies    No Known Allergies    Intolerances      PAST MEDICAL HISTORY  No pertinent past medical history    DM (diabetes mellitus)        PAST SURGICAL HISTORY  No significant past surgical history        FAMILY HISTORY:      SOCIAL HISTORY:   CIGARETTES:  quit 10 years ago, smoked since age 15   ALCOHOL: Occasional  DRUGS: Denies      CURRENT MEDICATIONS:           HOME MEDICATIONS:    Aspirin Enteric Coated 81 mg oral delayed release tablet: 1 tab(s) orally once a day (29 Apr 2021 18:51)  Januvia 100 mg oral tablet: 1 tab(s) orally once a day (29 Apr 2021 18:51)  metFORMIN 1000 mg oral tablet: 1 tab(s) orally 2 times a day  Resume on Monday 5/3/2021 (01 May 2021 08:58)  pioglitazone 15 mg oral tablet: 1 tab(s) orally once a day (29 Apr 2021 18:51)      Vital Signs Last 24 Hrs  T(C): --  T(F): --  HR: --  BP: --  BP(mean): --  RR: --  SpO2: --      PHYSICAL EXAM:  Constitutional: Comfortable . No acute distress.   HEENT: Atraumatic and normocephalic , neck is supple . no JVD. No carotid bruit. PEERL   CNS: A&Ox3. No focal deficits. EOMI.   Lymph Nodes: Cervical : Not palpable.  Respiratory: CTAB  Cardiovascular: S1S2 RRR. No murmur/rubs or gallop.  Gastrointestinal: Soft non-tender and non distended . +Bowel sounds. negative El's sign.  Extremities: No edema.   Psychiatric: Calm . no agitation.  Skin: No skin rash/ulcers visualized to face, hands or feet.    Intake and output:     LABS:            ;p-BNP=        INTERPRETATION OF TELEMETRY:   ECG:  NSR HR @ 80 , no ischemic changes     RADIOLOGY & ADDITIONAL STUDIES:    X-ray:     CT scan:   MRI:                                                                          [place] [facility]                                                               Office:  [address] [address]                                                              Telephone: [phone]. Fax:[phone]                                                                        CARDIOLOGY CONSULTATION NOTE                                                                                             Consult requested by:  Dr. Rudolph  Reason for Consultation: Chest Pain  PMD: Fátima  Primary Cardiology: Wolf  History obtained by: Patient and medical record   obtained: No    Chief complaint:    Patient is a 66y old  Male who presents with a chief complaint of chest pain      HPI: Pt is a 65 y/o male with medical history of DM, CAD s/p PCI mLAD-75% stenosis KEV X2, prox. Diag-90% KEV X2, who presents to [facility]-ED for chest pain.  Pt has been experiencing chest pain even after angiogram 4/2021. Pt has completed NST and echo in outaptient cardiology office to address complaint of chest pain-both were normal, no evidence of new ischemia. Pt called cardiology office today complaining of chest pain radiating down left arm, and back, sharp. Pt states that chest pain is worse with movement and palpation. Pt also complaining of right flank pain. In ED, ECG-NSR HR @ 80, no ischemic changes, Trop#1-0.02.         REVIEW OF SYMPTOMS:     CONSTITUTIONAL: No fever, weight loss, or fatigue  ENMT:  No difficulty hearing, tinnitus, vertigo; No sinus or throat pain  NECK: No pain or stiffness  CARDIOVASCULAR: See HPI  RESPIRATORY: No Dyspnea on exertion, Shortness of breath, cough, wheezing  : No dysuria, no hematuria   GI: No dark color stool, no melena, no diarrhea, no constipation, no abdominal pain   NEURO: No headache, no dizziness, no slurred speech   MUSCULOSKELETAL: No joint pain or swelling; No muscle, back, or extremity pain  PSYCH: No agitation, no anxiety.    ALL OTHER REVIEW OF SYSTEMS ARE NEGATIVE.      PREVIOUS DIAGNOSTIC TESTING    ECHO: 6/2021- EF 55-60%, grade I diastolic dysfx. , sclerotic AV with normal opening    STRESS: 6/2021- Exercise stress test 8 mets, normal study       CATHETERIZATION FINDINGS: < from: Cardiac Cath Lab - Adult (04.30.21 @ 16:23) >  CORONARY VESSELS: The coronary circulation is left dominant.  LM:   --  LM: Normal. The vessel was normal sized, not calcified, and not  tortuous. Angiography showed no evidence of disease.  LAD:   --  LAD: The vessel was normal sized, moderately calcified, and  mildly tortuous. Angiography showed multiple discrete lesions.  --  Mid LAD: There was a diffuse 75 % stenosis in the middle third of the  vessel segment.The lesion was without evidence of thrombus. There was  WALESKA grade 3 flow through the vessel (brisk flow). It appears amenable to  percutaneous intervention.  --  D1: The vessel was normal sized, mildly calcified, and mildly tortuous.  Angiography showed multiple discrete lesions. There was a diffuse 90 %  stenosis in the proximal third of the vessel segment. The lesion was  without evidence of thrombus. There was WALESKA grade 3 flow through the  vessel (brisk flow). This lesion is a likely culpritfor the patient's  anginal symptoms. It appears amenable to percutaneous intervention.  CX:   --  Circumflex: The vessel was large sized (dominant), not calcified,  and not tortuous. Angiography showed minor luminal irregularities with no  flow limiting lesions.  RCA:   --  RCA: The vessel was small (non-dominant), not calcified, and not  tortuous. Angiography showed minor luminal irregularities with no flow  limiting lesions.  COMPLICATIONS: No complications occurred during the cath lab visit.  DIAGNOSTIC IMPRESSIONS: There is significant single vessel coronary artery  disease.  Mid LAD=75%  Prox Diag=90%  Successful PCI of Mid LAD with KEV x 2 (Luis Felipe 3.0x12 mm x 2)  Successful PCI of Prox Diag 1 with KEV x 2 (Luis Felipe 2.25x8 and 2.5x8mm) Left  ventricular function is normal.  LVEF=60%      ALLERGIES: Allergies    No Known Allergies    Intolerances      PAST MEDICAL HISTORY  No pertinent past medical history    DM (diabetes mellitus)        PAST SURGICAL HISTORY  No significant past surgical history        FAMILY HISTORY:      SOCIAL HISTORY:   CIGARETTES:  quit 10 years ago, smoked since age 15   ALCOHOL: Occasional  DRUGS: Denies      CURRENT MEDICATIONS:           HOME MEDICATIONS:  Brilinta  Aspirin Enteric Coated 81 mg oral delayed release tablet: 1 tab(s) orally once a day (29 Apr 2021 18:51)  Januvia 100 mg oral tablet: 1 tab(s) orally once a day (29 Apr 2021 18:51)  metFORMIN 1000 mg oral tablet: 1 tab(s) orally 2 times a day  Resume on Monday 5/3/2021 (01 May 2021 08:58)  pioglitazone 15 mg oral tablet: 1 tab(s) orally once a day (29 Apr 2021 18:51)      Vital Signs Last 24 Hrs  T(C): --  T(F): --  HR: --  BP: --  BP(mean): --  RR: --  SpO2: --      PHYSICAL EXAM:  Constitutional: Comfortable . No acute distress.   HEENT: Atraumatic and normocephalic , neck is supple . no JVD. No carotid bruit. PEERL   CNS: A&Ox3. No focal deficits. EOMI.   Lymph Nodes: Cervical : Not palpable.  Respiratory: CTAB  Cardiovascular: S1S2 RRR. No murmur/rubs or gallop.  Gastrointestinal: Soft non-tender and non distended . +Bowel sounds. negative El's sign.  : Right flank tender  Extremities: No edema.   Psychiatric: Calm . no agitation.  Skin: No skin rash/ulcers visualized to face, hands or feet.    Intake and output:     LABS:            ;p-BNP=        INTERPRETATION OF TELEMETRY: NSR HR @ 70-80s  ECG:  NSR HR @ 80 , no ischemic changes

## 2021-07-21 NOTE — ED PROVIDER NOTE - PATIENT PORTAL LINK FT
You can access the FollowMyHealth Patient Portal offered by [facility] by registering at the following website: [email or web address]. By joining [facility]’s FollowMyHealth portal, you will also be able to view your health information using other applications (apps) compatible with our system.

## 2021-07-21 NOTE — ED PROVIDER NOTE - NSFOLLOWUPCLINICS_GEN_ALL_ED_FT
[facility]  Cardiology  [address], [address]  [place] [address]  Phone: [phone]  Fax:

## 2021-07-21 NOTE — ED ADULT NURSE NOTE - OBJECTIVE STATEMENT
Assumed pt care at 1208. Pt reports nonradiating chest tightness X 3 days and right flank pain for about a year.  PMD told him the flank pain is probably related to his diabetes since his bloodwork was normal. Assumed pt care at 1208. Pt reports nonradiating chest tightness X 3 days and right flank pain for about a year.  s/p 4 cardiac stents in March/april 2021 PMD told him the flank pain is probably related to his diabetes since his bloodwork was normal.

## 2021-07-21 NOTE — ED PROVIDER NOTE - OBJECTIVE STATEMENT
66yoM; with pmh signif for CAD, (s/p stent x1), HTN, HLD, DM; now p/w chest pain--x3 days, sscp, non-radiating, exertional, non-pleuritic, constant, associated with mild lightheadedness. denies associated sob, palpitations, nausea, diaphoresis. denies f/c/s. denies cough. denies travel, trauma or current smoking. denies recent surgery. denies sick contacts.  PMH: CAD, (s/p stent x1), HTN, HLD, DM;   SOCIAL: ex-smoker / denies illicit substance use / social EtOH

## 2021-07-22 LAB
CULTURE RESULTS: SIGNIFICANT CHANGE UP
SPECIMEN SOURCE: SIGNIFICANT CHANGE UP

## 2021-07-23 ENCOUNTER — NON-APPOINTMENT (OUTPATIENT)
Age: 66
End: 2021-07-23

## 2021-07-23 PROBLEM — Z95.5 PRESENCE OF CORONARY ANGIOPLASTY IMPLANT AND GRAFT: Chronic | Status: ACTIVE | Noted: 2021-07-21

## 2021-07-23 NOTE — ED POST DISCHARGE NOTE - RESULT SUMMARY
urine culture called back the pt denies any urinary symptoms . recommended follow up with primary care to repeat the urine culture

## 2021-07-26 ENCOUNTER — APPOINTMENT (OUTPATIENT)
Dept: FAMILY MEDICINE | Facility: CLINIC | Age: 66
End: 2021-07-26
Payer: MEDICARE

## 2021-07-26 ENCOUNTER — NON-APPOINTMENT (OUTPATIENT)
Age: 66
End: 2021-07-26

## 2021-07-26 VITALS
WEIGHT: 194 LBS | DIASTOLIC BLOOD PRESSURE: 76 MMHG | HEART RATE: 81 BPM | SYSTOLIC BLOOD PRESSURE: 126 MMHG | RESPIRATION RATE: 16 BRPM | TEMPERATURE: 97.3 F | BODY MASS INDEX: 29.4 KG/M2 | OXYGEN SATURATION: 98 % | HEIGHT: 68 IN

## 2021-07-26 DIAGNOSIS — R79.89 OTHER SPECIFIED ABNORMAL FINDINGS OF BLOOD CHEMISTRY: ICD-10-CM

## 2021-07-26 LAB — HEMOCCULT STL QL IA: NEGATIVE

## 2021-07-26 PROCEDURE — 93000 ELECTROCARDIOGRAM COMPLETE: CPT

## 2021-07-26 PROCEDURE — 99214 OFFICE O/P EST MOD 30 MIN: CPT | Mod: 25

## 2021-07-27 LAB
ALBUMIN SERPL ELPH-MCNC: 4.5 G/DL
ALP BLD-CCNC: 129 U/L
ALT SERPL-CCNC: 26 U/L
ANION GAP SERPL CALC-SCNC: 13 MMOL/L
APPEARANCE: CLEAR
AST SERPL-CCNC: 30 U/L
BILIRUB SERPL-MCNC: 0.7 MG/DL
BILIRUBIN URINE: NEGATIVE
BLOOD URINE: NEGATIVE
BUN SERPL-MCNC: 12 MG/DL
CALCIUM SERPL-MCNC: 10 MG/DL
CHLORIDE SERPL-SCNC: 104 MMOL/L
CHOLEST SERPL-MCNC: 85 MG/DL
CO2 SERPL-SCNC: 25 MMOL/L
COLOR: YELLOW
CREAT SERPL-MCNC: 0.8 MG/DL
ERYTHROCYTE [SEDIMENTATION RATE] IN BLOOD BY WESTERGREN METHOD: 28 MM/HR
FERRITIN SERPL-MCNC: 360 NG/ML
FOLATE SERPL-MCNC: 17.3 NG/ML
GLUCOSE QUALITATIVE U: NEGATIVE
GLUCOSE SERPL-MCNC: 100 MG/DL
HDLC SERPL-MCNC: 41 MG/DL
IRON SATN MFR SERPL: 21 %
IRON SERPL-MCNC: 75 UG/DL
KETONES URINE: NEGATIVE
LDLC SERPL CALC-MCNC: 33 MG/DL
LEUKOCYTE ESTERASE URINE: NEGATIVE
NITRITE URINE: NEGATIVE
NONHDLC SERPL-MCNC: 44 MG/DL
PH URINE: 5.5
POTASSIUM SERPL-SCNC: 4.3 MMOL/L
PROT SERPL-MCNC: 7.5 G/DL
PROTEIN URINE: NEGATIVE
PSA SERPL-MCNC: 0.15 NG/ML
SODIUM SERPL-SCNC: 141 MMOL/L
SPECIFIC GRAVITY URINE: 1.02
TIBC SERPL-MCNC: 357 UG/DL
TRIGL SERPL-MCNC: 55 MG/DL
TSH SERPL-ACNC: 1.89 UIU/ML
UIBC SERPL-MCNC: 282 UG/DL
UROBILINOGEN URINE: NORMAL
VIT B12 SERPL-MCNC: 414 PG/ML

## 2021-07-28 ENCOUNTER — APPOINTMENT (OUTPATIENT)
Dept: CARDIOLOGY | Facility: CLINIC | Age: 66
End: 2021-07-28
Payer: MEDICARE

## 2021-07-28 ENCOUNTER — NON-APPOINTMENT (OUTPATIENT)
Age: 66
End: 2021-07-28

## 2021-07-28 VITALS
RESPIRATION RATE: 17 BRPM | HEIGHT: 68 IN | DIASTOLIC BLOOD PRESSURE: 78 MMHG | BODY MASS INDEX: 29.55 KG/M2 | SYSTOLIC BLOOD PRESSURE: 124 MMHG | TEMPERATURE: 98.1 F | HEART RATE: 82 BPM | WEIGHT: 195 LBS | OXYGEN SATURATION: 100 %

## 2021-07-28 PROCEDURE — 93000 ELECTROCARDIOGRAM COMPLETE: CPT

## 2021-07-28 PROCEDURE — 99215 OFFICE O/P EST HI 40 MIN: CPT

## 2021-07-28 RX ORDER — METHOCARBAMOL 750 MG/1
750 TABLET, FILM COATED ORAL
Qty: 18 | Refills: 0 | Status: DISCONTINUED | COMMUNITY
Start: 2021-05-05

## 2021-07-29 LAB
BASOPHILS # BLD AUTO: 0.05 K/UL
BASOPHILS NFR BLD AUTO: 0.5 %
CREAT SPEC-SCNC: 147 MG/DL
EOSINOPHIL # BLD AUTO: 0.12 K/UL
EOSINOPHIL NFR BLD AUTO: 1.2 %
HCT VFR BLD CALC: 39.2 %
HGB BLD-MCNC: 12.2 G/DL
IMM GRANULOCYTES NFR BLD AUTO: 0.3 %
LYMPHOCYTES # BLD AUTO: 1.79 K/UL
LYMPHOCYTES NFR BLD AUTO: 18.2 %
MAN DIFF?: NORMAL
MCHC RBC-ENTMCNC: 28.2 PG
MCHC RBC-ENTMCNC: 31.1 GM/DL
MCV RBC AUTO: 90.7 FL
MICROALBUMIN 24H UR DL<=1MG/L-MCNC: <1.2 MG/DL
MICROALBUMIN/CREAT 24H UR-RTO: NORMAL MG/G
MONOCYTES # BLD AUTO: 0.96 K/UL
MONOCYTES NFR BLD AUTO: 9.7 %
NEUTROPHILS # BLD AUTO: 6.91 K/UL
NEUTROPHILS NFR BLD AUTO: 70.1 %
PLATELET # BLD AUTO: 253 K/UL
RBC # BLD: 4.32 M/UL
RBC # FLD: 14.8 %
WBC # FLD AUTO: 9.86 K/UL

## 2021-08-18 ENCOUNTER — APPOINTMENT (OUTPATIENT)
Dept: GASTROENTEROLOGY | Facility: CLINIC | Age: 66
End: 2021-08-18
Payer: MEDICARE

## 2021-08-18 VITALS
DIASTOLIC BLOOD PRESSURE: 78 MMHG | HEART RATE: 98 BPM | TEMPERATURE: 97.9 F | OXYGEN SATURATION: 98 % | BODY MASS INDEX: 30.31 KG/M2 | SYSTOLIC BLOOD PRESSURE: 120 MMHG | WEIGHT: 200 LBS | RESPIRATION RATE: 14 BRPM | HEIGHT: 68 IN

## 2021-08-18 PROCEDURE — 99203 OFFICE O/P NEW LOW 30 MIN: CPT

## 2021-08-18 NOTE — HISTORY OF PRESENT ILLNESS
[de-identified] : The patient arrived for evaluation.  He has history of coronary artery disease with PCI and on aspirin and Brilinta.  He has been complaining of chest discomfort.  This is not related to eating.  However he has a lot of bloating and eructation.  He has been reevaluated with the stress test by cardiology.  As per them, this is atypical chest pain which may be GI in origin.  He has been on PPI.  He takes Maalox.  He was evaluated for a colonoscopy in July 2018 but he has not performed that.  Recent blood work seems to be unremarkable.  No previous colonoscopy or endoscopy.

## 2021-08-18 NOTE — ASSESSMENT
[FreeTextEntry1] : Differential include,  erosive esophagitis, reflux, gastroesophagitis.  Unlike malignancy.  I have discussed the need for trial of PPI therapy twice daily.  We will also schedule him for a upper endoscopy after cardiology clearance.\par \par Risks (including bleeding, pain, perforation, incomplete examination, adverse reactions to medications, aspiration and death), benefits and alternatives were discussed. Patient is agreeable for the EGD. The patient is medically optimized for the procedure. We will schedule the patient for the procedure.\par

## 2021-08-23 ENCOUNTER — APPOINTMENT (OUTPATIENT)
Dept: FAMILY MEDICINE | Facility: CLINIC | Age: 66
End: 2021-08-23
Payer: MEDICARE

## 2021-08-23 VITALS
DIASTOLIC BLOOD PRESSURE: 78 MMHG | OXYGEN SATURATION: 98 % | TEMPERATURE: 97 F | HEIGHT: 68 IN | BODY MASS INDEX: 29.55 KG/M2 | RESPIRATION RATE: 16 BRPM | HEART RATE: 80 BPM | WEIGHT: 195 LBS | SYSTOLIC BLOOD PRESSURE: 122 MMHG

## 2021-08-23 LAB — HBA1C MFR BLD HPLC: 6.5

## 2021-08-23 PROCEDURE — 83036 HEMOGLOBIN GLYCOSYLATED A1C: CPT | Mod: QW

## 2021-08-23 PROCEDURE — 99214 OFFICE O/P EST MOD 30 MIN: CPT | Mod: 25

## 2021-08-23 NOTE — PHYSICAL EXAM
[Well Nourished] : well nourished [Normal Sclera/Conjunctiva] : normal sclera/conjunctiva [PERRL] : pupils equal round and reactive to light [Normal Outer Ear/Nose] : the outer ears and nose were normal in appearance [EOMI] : extraocular movements intact [Normal Oropharynx] : the oropharynx was normal [No JVD] : no jugular venous distention [No Lymphadenopathy] : no lymphadenopathy [Supple] : supple [Thyroid Normal, No Nodules] : the thyroid was normal and there were no nodules present [No Respiratory Distress] : no respiratory distress  [No Accessory Muscle Use] : no accessory muscle use [Clear to Auscultation] : lungs were clear to auscultation bilaterally [Normal Rate] : normal rate  [Normal S1, S2] : normal S1 and S2 [Regular Rhythm] : with a regular rhythm [No Murmur] : no murmur heard [No Carotid Bruits] : no carotid bruits [No Abdominal Bruit] : a ~M bruit was not heard ~T in the abdomen [No Varicosities] : no varicosities [Pedal Pulses Present] : the pedal pulses are present [No Edema] : there was no peripheral edema [No Palpable Aorta] : no palpable aorta [No Extremity Clubbing/Cyanosis] : no extremity clubbing/cyanosis [Soft] : abdomen soft [Non Tender] : non-tender [Non-distended] : non-distended [No HSM] : no HSM [No Masses] : no abdominal mass palpated [Normal Bowel Sounds] : normal bowel sounds [Normal Anterior Cervical Nodes] : no anterior cervical lymphadenopathy [Normal Posterior Cervical Nodes] : no posterior cervical lymphadenopathy [No CVA Tenderness] : no CVA  tenderness [No Spinal Tenderness] : no spinal tenderness [No Joint Swelling] : no joint swelling [Grossly Normal Strength/Tone] : grossly normal strength/tone [No Rash] : no rash [Coordination Grossly Intact] : coordination grossly intact [No Focal Deficits] : no focal deficits [Normal Gait] : normal gait [Deep Tendon Reflexes (DTR)] : deep tendon reflexes were 2+ and symmetric [Normal Affect] : the affect was normal [Normal Insight/Judgement] : insight and judgment were intact [Normal] : affect was normal and insight and judgment were intact [de-identified] : obese [de-identified] : Dyspnea is experienced with chest pain [de-identified] : Experiences chest pain at rest, chest wall tenderness elicited [de-identified] : ankle edema

## 2021-08-23 NOTE — HISTORY OF PRESENT ILLNESS
[FreeTextEntry1] : Follow up 2 month follow up [de-identified] : 66M with a PMH of CAD, DM2. GERD, HLD, HTN, presents to the office for a 2 month follow up. Patient is following up with gastro for endoscopy in regards to heart burn symptom. Patients complains of lower back pain bilaterally that radiates to the glute. Patient denies SOB, fever, chills, and night sweats. Patient is fully COVID vaccinated as of yesterday. \par \par

## 2021-08-23 NOTE — COUNSELING
[Use proper foot wear] : Use proper foot wear [Use recommended devices] : Use recommended devices [Engage in a relaxing activity] : Engage in a relaxing activity [AUDIT-C Screening administered and reviewed] : AUDIT-C Screening administered and reviewed [Encouraged to increase physical activity] : Encouraged to increase physical activity [None] : None [Good understanding] : Patient has a good understanding of lifestyle changes and steps needed to achieve self management goal [FreeTextEntry2] : Drinks alcohol occasionally

## 2021-08-23 NOTE — HEALTH RISK ASSESSMENT
[No] : In the past 12 months have you used drugs other than those required for medical reasons? No [No falls in past year] : Patient reported no falls in the past year [0] : 2) Feeling down, depressed, or hopeless: Not at all (0) [PHQ-2 Negative - No further assessment needed] : PHQ-2 Negative - No further assessment needed [] : No [de-identified] : Former [YearQuit] : 2012 [[id number]] : 0 [Patient/Caregiver not ready to engage] : , patient/caregiver not ready to engage

## 2021-08-23 NOTE — ASSESSMENT
[FreeTextEntry1] : 66M with a PMH of CAD, DM2. GERD, HLD, HTN, presents to the office for a 2 month follow up. Patient is following up with gastro for endoscopy in regards to heart burn symptoms. Patient denies SOB, fever, chills, and night sweats. Patient is fully COVID vaccinated as of yesterday. \par \par \par \par A1c = % 6.6\par Patient care plan discussed\par diet and weight loss discussed \par Meds done \par COMPLIANCE WITH MEDS AND DIET REVIEWED\par Pt follows up with cardiology and endo \par Reviewed with pt diet

## 2021-09-07 ENCOUNTER — APPOINTMENT (OUTPATIENT)
Dept: FAMILY MEDICINE | Facility: CLINIC | Age: 66
End: 2021-09-07
Payer: MEDICARE

## 2021-09-07 VITALS
OXYGEN SATURATION: 98 % | DIASTOLIC BLOOD PRESSURE: 80 MMHG | SYSTOLIC BLOOD PRESSURE: 118 MMHG | WEIGHT: 198 LBS | HEART RATE: 100 BPM | TEMPERATURE: 98 F | HEIGHT: 68 IN | BODY MASS INDEX: 30.01 KG/M2 | RESPIRATION RATE: 16 BRPM

## 2021-09-07 VITALS
RESPIRATION RATE: 16 BRPM | OXYGEN SATURATION: 98 % | HEART RATE: 78 BPM | DIASTOLIC BLOOD PRESSURE: 78 MMHG | HEIGHT: 68 IN | WEIGHT: 226 LBS | SYSTOLIC BLOOD PRESSURE: 130 MMHG | TEMPERATURE: 98 F | BODY MASS INDEX: 34.25 KG/M2

## 2021-09-07 DIAGNOSIS — R07.89 OTHER CHEST PAIN: ICD-10-CM

## 2021-09-07 DIAGNOSIS — Z79.899 OTHER LONG TERM (CURRENT) DRUG THERAPY: ICD-10-CM

## 2021-09-07 PROCEDURE — 99215 OFFICE O/P EST HI 40 MIN: CPT

## 2021-09-07 NOTE — PHYSICAL EXAM
[Well Nourished] : well nourished [Normal Sclera/Conjunctiva] : normal sclera/conjunctiva [PERRL] : pupils equal round and reactive to light [EOMI] : extraocular movements intact [Normal Outer Ear/Nose] : the outer ears and nose were normal in appearance [Normal Oropharynx] : the oropharynx was normal [No JVD] : no jugular venous distention [No Lymphadenopathy] : no lymphadenopathy [Supple] : supple [Thyroid Normal, No Nodules] : the thyroid was normal and there were no nodules present [No Respiratory Distress] : no respiratory distress  [No Accessory Muscle Use] : no accessory muscle use [Clear to Auscultation] : lungs were clear to auscultation bilaterally [Normal Rate] : normal rate  [Regular Rhythm] : with a regular rhythm [Normal S1, S2] : normal S1 and S2 [No Murmur] : no murmur heard [No Carotid Bruits] : no carotid bruits [No Abdominal Bruit] : a ~M bruit was not heard ~T in the abdomen [No Varicosities] : no varicosities [Pedal Pulses Present] : the pedal pulses are present [No Edema] : there was no peripheral edema [No Palpable Aorta] : no palpable aorta [No Extremity Clubbing/Cyanosis] : no extremity clubbing/cyanosis [Soft] : abdomen soft [Non Tender] : non-tender [Non-distended] : non-distended [No Masses] : no abdominal mass palpated [No HSM] : no HSM [Normal Bowel Sounds] : normal bowel sounds [Normal Posterior Cervical Nodes] : no posterior cervical lymphadenopathy [Normal Anterior Cervical Nodes] : no anterior cervical lymphadenopathy [No CVA Tenderness] : no CVA  tenderness [No Spinal Tenderness] : no spinal tenderness [No Joint Swelling] : no joint swelling [Grossly Normal Strength/Tone] : grossly normal strength/tone [No Rash] : no rash [Coordination Grossly Intact] : coordination grossly intact [No Focal Deficits] : no focal deficits [Normal Gait] : normal gait [Deep Tendon Reflexes (DTR)] : deep tendon reflexes were 2+ and symmetric [Normal Affect] : the affect was normal [Normal Insight/Judgement] : insight and judgment were intact [Normal] : affect was normal and insight and judgment were intact [de-identified] : obese [de-identified] : Dyspnea is experienced with chest pain [de-identified] : Experiences chest pain at rest, chest wall tenderness elicited [de-identified] : ankle edema

## 2021-09-07 NOTE — PHYSICAL EXAM
[Well Nourished] : well nourished [Normal Sclera/Conjunctiva] : normal sclera/conjunctiva [PERRL] : pupils equal round and reactive to light [EOMI] : extraocular movements intact [Normal Outer Ear/Nose] : the outer ears and nose were normal in appearance [Normal Oropharynx] : the oropharynx was normal [No JVD] : no jugular venous distention [No Lymphadenopathy] : no lymphadenopathy [Supple] : supple [Thyroid Normal, No Nodules] : the thyroid was normal and there were no nodules present [No Respiratory Distress] : no respiratory distress  [No Accessory Muscle Use] : no accessory muscle use [Clear to Auscultation] : lungs were clear to auscultation bilaterally [Normal Rate] : normal rate  [Regular Rhythm] : with a regular rhythm [Normal S1, S2] : normal S1 and S2 [No Murmur] : no murmur heard [No Carotid Bruits] : no carotid bruits [No Abdominal Bruit] : a ~M bruit was not heard ~T in the abdomen [No Varicosities] : no varicosities [Pedal Pulses Present] : the pedal pulses are present [No Edema] : there was no peripheral edema [No Palpable Aorta] : no palpable aorta [No Extremity Clubbing/Cyanosis] : no extremity clubbing/cyanosis [Soft] : abdomen soft [Non Tender] : non-tender [Non-distended] : non-distended [No Masses] : no abdominal mass palpated [No HSM] : no HSM [Normal Bowel Sounds] : normal bowel sounds [Normal Posterior Cervical Nodes] : no posterior cervical lymphadenopathy [Normal Anterior Cervical Nodes] : no anterior cervical lymphadenopathy [No CVA Tenderness] : no CVA  tenderness [No Spinal Tenderness] : no spinal tenderness [No Joint Swelling] : no joint swelling [Grossly Normal Strength/Tone] : grossly normal strength/tone [No Rash] : no rash [Coordination Grossly Intact] : coordination grossly intact [No Focal Deficits] : no focal deficits [Normal Gait] : normal gait [Deep Tendon Reflexes (DTR)] : deep tendon reflexes were 2+ and symmetric [Normal Affect] : the affect was normal [Normal Insight/Judgement] : insight and judgment were intact [Normal] : affect was normal and insight and judgment were intact [de-identified] : obese [de-identified] : Dyspnea is experienced with chest pain [de-identified] : Experiences chest pain at rest, chest wall tenderness elicited [de-identified] : ankle edema

## 2021-09-07 NOTE — HISTORY OF PRESENT ILLNESS
[Coronary Artery Disease] : coronary artery disease [Recent Myocardial Infarction] : recent myocardial infarction [Atrial Fibrillation] : no atrial fibrillation [FreeTextEntry1] : Endoscopy [FreeTextEntry2] : 10/14/21 [FreeTextEntry3] : Pending [FreeTextEntry4] : Pt in for EGD post-stent placement.

## 2021-09-07 NOTE — ASSESSMENT
[Patient Optimized for Surgery] : Patient optimized for surgery [FreeTextEntry4] : Pt cleared for procedure.\par Cardiac clearance under separate letter.

## 2021-09-07 NOTE — REVIEW OF SYSTEMS
[Fatigue] : fatigue [Lower Ext Edema] : lower extremity edema [Paroxysmal Nocturnal Dyspnea] : paroxysmal nocturnal dyspnea [Shortness Of Breath] : shortness of breath [Diarrhea] : diarrhea [Back Pain] : back pain [Negative] : Heme/Lymph [Fever] : no fever [Night Sweats] : no night sweats [Earache] : no earache [Hearing Loss] : no hearing loss [Nosebleeds] : no nosebleeds [Postnasal Drip] : no postnasal drip [Nasal Discharge] : no nasal discharge [Sore Throat] : no sore throat [Hoarseness] : no hoarseness [Chest Pain] : no chest pain [Palpitations] : no palpitations [Wheezing] : no wheezing [Cough] : no cough [Abdominal Pain] : no abdominal pain [Nausea] : no nausea [Constipation] : no constipation [Vomiting] : no vomiting [Heartburn] : no heartburn [Melena] : no melena [Hesitancy] : no hesitancy [Joint Pain] : no joint pain [Muscle Weakness] : no muscle weakness [Itching] : no itching [Mole Changes] : no mole changes [Hair Changes] : no hair changes [Insomnia] : no insomnia [Depression] : no depression

## 2021-09-15 ENCOUNTER — APPOINTMENT (OUTPATIENT)
Dept: CARDIOLOGY | Facility: CLINIC | Age: 66
End: 2021-09-15
Payer: MEDICARE

## 2021-09-15 ENCOUNTER — NON-APPOINTMENT (OUTPATIENT)
Age: 66
End: 2021-09-15

## 2021-09-15 VITALS
BODY MASS INDEX: 29.25 KG/M2 | WEIGHT: 193 LBS | TEMPERATURE: 98.6 F | SYSTOLIC BLOOD PRESSURE: 130 MMHG | OXYGEN SATURATION: 99 % | HEIGHT: 68 IN | DIASTOLIC BLOOD PRESSURE: 70 MMHG | HEART RATE: 98 BPM

## 2021-09-15 PROCEDURE — 99214 OFFICE O/P EST MOD 30 MIN: CPT

## 2021-09-15 PROCEDURE — 93000 ELECTROCARDIOGRAM COMPLETE: CPT

## 2021-10-11 ENCOUNTER — APPOINTMENT (OUTPATIENT)
Dept: DISASTER EMERGENCY | Facility: CLINIC | Age: 66
End: 2021-10-11

## 2021-10-12 LAB — SARS-COV-2 N GENE NPH QL NAA+PROBE: NOT DETECTED

## 2021-10-13 ENCOUNTER — TRANSCRIPTION ENCOUNTER (OUTPATIENT)
Age: 66
End: 2021-10-13

## 2021-10-14 ENCOUNTER — APPOINTMENT (OUTPATIENT)
Dept: GASTROENTEROLOGY | Facility: GI CENTER | Age: 66
End: 2021-10-14
Payer: MEDICARE

## 2021-10-14 ENCOUNTER — OUTPATIENT (OUTPATIENT)
Dept: OUTPATIENT SERVICES | Facility: HOSPITAL | Age: 66
LOS: 1 days | End: 2021-10-14
Payer: COMMERCIAL

## 2021-10-14 ENCOUNTER — RESULT REVIEW (OUTPATIENT)
Age: 66
End: 2021-10-14

## 2021-10-14 DIAGNOSIS — R07.89 OTHER CHEST PAIN: ICD-10-CM

## 2021-10-14 LAB — GLUCOSE BLDC GLUCOMTR-MCNC: 96 MG/DL — SIGNIFICANT CHANGE UP (ref 70–99)

## 2021-10-14 PROCEDURE — 82962 GLUCOSE BLOOD TEST: CPT

## 2021-10-14 PROCEDURE — 88342 IMHCHEM/IMCYTCHM 1ST ANTB: CPT

## 2021-10-14 PROCEDURE — 88342 IMHCHEM/IMCYTCHM 1ST ANTB: CPT | Mod: 26

## 2021-10-14 PROCEDURE — 43239 EGD BIOPSY SINGLE/MULTIPLE: CPT

## 2021-10-14 PROCEDURE — 88305 TISSUE EXAM BY PATHOLOGIST: CPT

## 2021-10-14 PROCEDURE — 88305 TISSUE EXAM BY PATHOLOGIST: CPT | Mod: 26

## 2021-10-19 LAB — SURGICAL PATHOLOGY STUDY: SIGNIFICANT CHANGE UP

## 2021-11-17 ENCOUNTER — NON-APPOINTMENT (OUTPATIENT)
Age: 66
End: 2021-11-17

## 2021-11-22 ENCOUNTER — APPOINTMENT (OUTPATIENT)
Dept: FAMILY MEDICINE | Facility: CLINIC | Age: 66
End: 2021-11-22
Payer: MEDICARE

## 2021-11-22 VITALS
HEIGHT: 68 IN | WEIGHT: 195 LBS | SYSTOLIC BLOOD PRESSURE: 134 MMHG | TEMPERATURE: 97.2 F | DIASTOLIC BLOOD PRESSURE: 84 MMHG | HEART RATE: 80 BPM | BODY MASS INDEX: 29.55 KG/M2 | RESPIRATION RATE: 16 BRPM | OXYGEN SATURATION: 99 %

## 2021-11-22 PROCEDURE — 90662 IIV NO PRSV INCREASED AG IM: CPT

## 2021-11-22 PROCEDURE — 99214 OFFICE O/P EST MOD 30 MIN: CPT | Mod: 25

## 2021-11-22 PROCEDURE — G0008: CPT

## 2021-11-22 NOTE — PLAN
[FreeTextEntry1] : Patient given High Dose Flu vaccine in left deltoid by RN, he tolerated well.  Denied any adverse reaction to previous vaccines.  Education sheet given.  KERI, RN

## 2021-11-22 NOTE — HEALTH RISK ASSESSMENT
[No] : In the past 12 months have you used drugs other than those required for medical reasons? No [No falls in past year] : Patient reported no falls in the past year [0] : 2) Feeling down, depressed, or hopeless: Not at all (0) [] : No [de-identified] : Former [YearQuit] : 2012 [[id number]] : 0

## 2021-11-22 NOTE — ASSESSMENT
[FreeTextEntry1] : Patient was seen in ER [facility]  had work up and no cardiac events has cardio eval pending had endoscopy followed by gi Had bulge in right neck for months\par CT\par Patient care plan discussed\par diet and weight loss discussed \par Meds done \par COMPLIANCE REVIEWED\par Pt has a follow up with Cardiologist \par Reviewed with pt

## 2021-11-22 NOTE — REVIEW OF SYSTEMS
[Fatigue] : fatigue [Chest Pain] : chest pain [Lower Ext Edema] : lower extremity edema [Paroxysmal Nocturnal Dyspnea] : paroxysmal nocturnal dyspnea [Shortness Of Breath] : shortness of breath [Nausea] : nausea [Diarrhea] : diarrhea [Heartburn] : heartburn [Back Pain] : back pain [Negative] : Heme/Lymph [Fever] : no fever [Night Sweats] : no night sweats [Earache] : no earache [Hearing Loss] : no hearing loss [Nosebleeds] : no nosebleeds [Postnasal Drip] : no postnasal drip [Nasal Discharge] : no nasal discharge [Sore Throat] : no sore throat [Hoarseness] : no hoarseness [Palpitations] : no palpitations [Wheezing] : no wheezing [Cough] : no cough [Abdominal Pain] : no abdominal pain [Constipation] : no constipation [Vomiting] : no vomiting [Melena] : no melena [Hesitancy] : no hesitancy [Joint Pain] : no joint pain [Muscle Weakness] : no muscle weakness [Mole Changes] : no mole changes [Itching] : no itching [Hair Changes] : no hair changes [Insomnia] : no insomnia [Depression] : no depression [FreeTextEntry7] : Diarrhea for a couple of weeks

## 2021-11-22 NOTE — HISTORY OF PRESENT ILLNESS
[FreeTextEntry1] : Patient was seen in ER [facility]  had work up and no cardiac events has cardio eval pending had endoscopy followed by gi Had bulge in right neck for months

## 2021-11-22 NOTE — PHYSICAL EXAM
[Well Nourished] : well nourished [Normal Sclera/Conjunctiva] : normal sclera/conjunctiva [PERRL] : pupils equal round and reactive to light [EOMI] : extraocular movements intact [Normal Outer Ear/Nose] : the outer ears and nose were normal in appearance [Normal Oropharynx] : the oropharynx was normal [No JVD] : no jugular venous distention [No Lymphadenopathy] : no lymphadenopathy [Supple] : supple [Thyroid Normal, No Nodules] : the thyroid was normal and there were no nodules present [No Respiratory Distress] : no respiratory distress  [No Accessory Muscle Use] : no accessory muscle use [Clear to Auscultation] : lungs were clear to auscultation bilaterally [Normal Rate] : normal rate  [Regular Rhythm] : with a regular rhythm [Normal S1, S2] : normal S1 and S2 [No Murmur] : no murmur heard [No Carotid Bruits] : no carotid bruits [No Abdominal Bruit] : a ~M bruit was not heard ~T in the abdomen [No Varicosities] : no varicosities [Pedal Pulses Present] : the pedal pulses are present [No Edema] : there was no peripheral edema [No Palpable Aorta] : no palpable aorta [No Extremity Clubbing/Cyanosis] : no extremity clubbing/cyanosis [Soft] : abdomen soft [Non Tender] : non-tender [Non-distended] : non-distended [No Masses] : no abdominal mass palpated [No HSM] : no HSM [Normal Bowel Sounds] : normal bowel sounds [Normal Posterior Cervical Nodes] : no posterior cervical lymphadenopathy [Normal Anterior Cervical Nodes] : no anterior cervical lymphadenopathy [No CVA Tenderness] : no CVA  tenderness [No Spinal Tenderness] : no spinal tenderness [No Joint Swelling] : no joint swelling [Grossly Normal Strength/Tone] : grossly normal strength/tone [No Rash] : no rash [Coordination Grossly Intact] : coordination grossly intact [No Focal Deficits] : no focal deficits [Normal Gait] : normal gait [Deep Tendon Reflexes (DTR)] : deep tendon reflexes were 2+ and symmetric [Normal Affect] : the affect was normal [Normal Insight/Judgement] : insight and judgment were intact [Normal] : affect was normal and insight and judgment were intact [de-identified] : Dyspnea is experienced with chest pain [de-identified] : obese [de-identified] : Experiences chest pain at rest, chest wall tenderness elicited [de-identified] : ankle edema

## 2021-11-24 ENCOUNTER — NON-APPOINTMENT (OUTPATIENT)
Age: 66
End: 2021-11-24

## 2021-12-04 ENCOUNTER — APPOINTMENT (OUTPATIENT)
Dept: CT IMAGING | Facility: CLINIC | Age: 66
End: 2021-12-04

## 2021-12-15 ENCOUNTER — OUTPATIENT (OUTPATIENT)
Dept: OUTPATIENT SERVICES | Facility: HOSPITAL | Age: 66
LOS: 1 days | End: 2021-12-15
Payer: COMMERCIAL

## 2021-12-15 ENCOUNTER — APPOINTMENT (OUTPATIENT)
Dept: ULTRASOUND IMAGING | Facility: CLINIC | Age: 66
End: 2021-12-15
Payer: MEDICARE

## 2021-12-15 DIAGNOSIS — S19.9XXA UNSPECIFIED INJURY OF NECK, INITIAL ENCOUNTER: ICD-10-CM

## 2021-12-15 PROCEDURE — 76536 US EXAM OF HEAD AND NECK: CPT

## 2021-12-15 PROCEDURE — 76536 US EXAM OF HEAD AND NECK: CPT | Mod: 26

## 2021-12-21 ENCOUNTER — NON-APPOINTMENT (OUTPATIENT)
Age: 66
End: 2021-12-21

## 2021-12-21 ENCOUNTER — APPOINTMENT (OUTPATIENT)
Dept: CARDIOLOGY | Facility: CLINIC | Age: 66
End: 2021-12-21
Payer: MEDICARE

## 2021-12-21 VITALS
RESPIRATION RATE: 14 BRPM | TEMPERATURE: 97.5 F | DIASTOLIC BLOOD PRESSURE: 76 MMHG | HEIGHT: 68 IN | SYSTOLIC BLOOD PRESSURE: 124 MMHG | OXYGEN SATURATION: 99 % | WEIGHT: 190 LBS | BODY MASS INDEX: 28.79 KG/M2 | HEART RATE: 87 BPM

## 2021-12-21 PROCEDURE — 99213 OFFICE O/P EST LOW 20 MIN: CPT

## 2021-12-21 PROCEDURE — 93000 ELECTROCARDIOGRAM COMPLETE: CPT

## 2021-12-21 NOTE — REVIEW OF SYSTEMS
[Abdominal Pain] : abdominal pain [Heartburn] : heartburn [Negative] : Heme/Lymph [Change in Appetite] : no change in appetite [Constipation] : no constipation

## 2021-12-21 NOTE — HISTORY OF PRESENT ILLNESS
[FreeTextEntry1] : 65 yo man, , father of one son. No prior history of heart disease. C/O chest pain since 1995 that has been evaluated in the past, atypical, constant, pressure like, associated with SOB, especially when moving the left arm. Was treated with NTG for a period (2000) with no response. \par In May 2018 underwent a nuclear stress test (8 METS) and there was no evidence of ischemia and echocardiogram revealed a normal LV function (55%) with mild dilatation of the aortic root. Not clear what tests he had done in the past.\par Lost to follow up. In 4/30/2021 he underwent PCI to the LAD/Diag with KEV x 4. LV function was normal.\par Still C/O effort related chest pain, mostly when moving the arms or lifting heavy objects. \par Echo done on 6/13/2021 revealed normal LV function. \par Stress test 6/2021 8 METS, normal LV functiion. No ischemia.  \par Still C/O chest pain, associated with any type of movement of the chest or by simply touching the chest/ribs, or with deep inspiration, radiates to the back. Worse also when he climbs stairs. Denies palpitations, dizziness or ankle swelling. No orthopnea or PND. \david Has DM since 2013 that is treated with oral meds. Last LnY5r=1.5%\par Smoked for 30 years and stopped in 2013 \par Used to drink ETOH in the past. States that stopped in Jan 2021\david Has received the vaccine (Pfizer)

## 2021-12-21 NOTE — DISCUSSION/SUMMARY
[FreeTextEntry1] : Mr. ADÁN SHABAZZ is a 66 year male with known CAD. Underwent PCI to the LAD and Diag in 4/2021. Since then still C/O atypical chest pain, at the Lt costocondral articulation. Stress test and echo were WNL.\par I have recommended to continue the same medications.\par I also recommended lifestyle modification with weight loss and exercise.\par Routine follow up in 6 months\par

## 2021-12-21 NOTE — ASSESSMENT
[FreeTextEntry1] : ECG performed today at the clinic revealed a NSR 82 BPM, normal AQRS, AR, QRS and QTc

## 2021-12-28 ENCOUNTER — NON-APPOINTMENT (OUTPATIENT)
Age: 66
End: 2021-12-28

## 2021-12-28 ENCOUNTER — APPOINTMENT (OUTPATIENT)
Dept: CT IMAGING | Facility: CLINIC | Age: 66
End: 2021-12-28

## 2021-12-28 ENCOUNTER — OUTPATIENT (OUTPATIENT)
Dept: OUTPATIENT SERVICES | Facility: HOSPITAL | Age: 66
LOS: 1 days | End: 2021-12-28

## 2021-12-28 DIAGNOSIS — S19.9XXA UNSPECIFIED INJURY OF NECK, INITIAL ENCOUNTER: ICD-10-CM

## 2021-12-29 ENCOUNTER — EMERGENCY (EMERGENCY)
Facility: HOSPITAL | Age: 66
LOS: 1 days | Discharge: HOME | End: 2021-12-29
Payer: COMMERCIAL

## 2021-12-29 VITALS
SYSTOLIC BLOOD PRESSURE: 133 MMHG | HEART RATE: 87 BPM | HEIGHT: 67 IN | OXYGEN SATURATION: 99 % | RESPIRATION RATE: 16 BRPM | DIASTOLIC BLOOD PRESSURE: 82 MMHG | TEMPERATURE: 98 F

## 2021-12-29 LAB — SARS-COV-2 RNA SPEC QL NAA+PROBE: SIGNIFICANT CHANGE UP

## 2021-12-29 PROCEDURE — 99283 EMERGENCY DEPT VISIT LOW MDM: CPT

## 2021-12-29 PROCEDURE — U0005: CPT

## 2021-12-29 PROCEDURE — U0003: CPT

## 2021-12-29 PROCEDURE — 99282 EMERGENCY DEPT VISIT SF MDM: CPT

## 2021-12-29 NOTE — ED PROVIDER NOTE - PATIENT PORTAL LINK FT
You can access the FollowMyHealth Patient Portal offered by [facility] by registering at the following website: [email or web address]. By joining [facility]’s FollowMyHealth portal, you will also be able to view your health information using other applications (apps) compatible with our system.

## 2022-01-21 ENCOUNTER — APPOINTMENT (OUTPATIENT)
Dept: SURGERY | Facility: CLINIC | Age: 67
End: 2022-01-21
Payer: MEDICARE

## 2022-01-21 VITALS
OXYGEN SATURATION: 96 % | HEART RATE: 87 BPM | WEIGHT: 198 LBS | SYSTOLIC BLOOD PRESSURE: 143 MMHG | DIASTOLIC BLOOD PRESSURE: 79 MMHG | HEIGHT: 64.5 IN | RESPIRATION RATE: 14 BRPM | BODY MASS INDEX: 33.39 KG/M2 | TEMPERATURE: 97.3 F

## 2022-01-21 PROCEDURE — 99214 OFFICE O/P EST MOD 30 MIN: CPT

## 2022-01-21 NOTE — HISTORY OF PRESENT ILLNESS
[de-identified] : The patient comes to the office for follow up of the umbilical hernia and a new lump of the right medial clavicle.  The patient reports that he has been having pain in the right neck and shoulder area with movement.  He noted while shaving that there was a hard lump above the sternum.  The patient is without shortness of breath or chest pain.  He has also noted increased discomfort of the abdominal wall as he has gained weight and is now up about 18 pounds.

## 2022-01-21 NOTE — PHYSICAL EXAM
[JVD] : no jugular venous distention  [No Rash or Lesion] : No rash or lesion [Purpura] : no purpura  [Petechiae] : no petechiae [Skin Ulcer] : no ulcer [Skin Induration] : no induration [Alert] : alert [Oriented to Person] : oriented to person [Oriented to Place] : oriented to place [Oriented to Time] : oriented to time [Calm] : calm [de-identified] : non toxic, in no acute distress [de-identified] : NC/AT PERRL EOMI no scleral icterus [de-identified] : trachea midline, no gross mass [de-identified] : no audible wheezing or stridor [de-identified] : obese soft, redundant pannus of the lower abdominal wall, no tenderness, no guarding, no rebound  [de-identified] : phallus normal, no scrotal mass or tenderness [de-identified] : FROM of all extremities with no gross deformity or angulation, there remains a small non tender reducible umbilical hernia that remains stable, there is a prominent bony prominence of the right medial clavicular head with  ulceration, redness or tenderness  [de-identified] : bilateral inguinal scars consistent with the patient's prior surgical history [de-identified] : mood is calm

## 2022-01-21 NOTE — ASSESSMENT
[FreeTextEntry1] : The patient is an obese 66 year old male with a stable umbilical hernia, and a newly seen bony prominence that correlates with the medial clavicular head.  I so not appreciate a discrete mass.  Regarding the umbilical hernia we again discussed the importance of weight loss.  The patient has been advised that weight loss will be an important adjunct to help potentially reduce the risks of infection, hernia recurrence, and chronic post operative pain associated with surgery by potentially reducing the tension along the abdominal wall. The risks, benefits, and alternatives including the option of doing nothing to an open umbilical hernia repair with possible mesh were discussed.  The potential complications including but not limited to infection, bleeding, hernia recurrence, chronic post-operative pain, and seroma formation were discussed.  The patient was educated regarding the signs and symptoms of hernia strangulation and advised to seek immediate MD evaluation should this occur.  The patient understands and he will embark on a weight loss routine.  Regarding the neck and shoulder pain, he may benefit from an ortho evaluation as this may be related to the shoulder and not the palpable bony prominence.  A total of 30 minutes was spent coordinating the patient's care. \par

## 2022-06-07 NOTE — PLAN
[FreeTextEntry1] : Patient in for follow up of poorly controlled DM patient having weight loss and better diet, did not get test kit, Still eith hernia, PMH HTN HLD GERD  needs labs and meds \par \par Accucheck reviewed\par Diabetic care plan reviewed\par Diet and exercise\par Hydration \par RTC 2 weeks with diabetic journal 
no acute distress, non toxic appearing, awake

## 2022-06-20 RX ORDER — PANTOPRAZOLE 40 MG/1
40 TABLET, DELAYED RELEASE ORAL DAILY
Qty: 30 | Refills: 1 | Status: DISCONTINUED | COMMUNITY
Start: 2021-05-06 | End: 2022-06-20

## 2022-06-20 RX ORDER — ASPIRIN 81 MG/1
81 TABLET, CHEWABLE ORAL
Qty: 30 | Refills: 3 | Status: ACTIVE | COMMUNITY
Start: 2018-07-18

## 2022-06-21 ENCOUNTER — NON-APPOINTMENT (OUTPATIENT)
Age: 67
End: 2022-06-21

## 2022-06-21 ENCOUNTER — APPOINTMENT (OUTPATIENT)
Dept: CARDIOLOGY | Facility: CLINIC | Age: 67
End: 2022-06-21
Payer: MEDICARE

## 2022-06-21 VITALS
HEART RATE: 101 BPM | TEMPERATURE: 98.9 F | WEIGHT: 198 LBS | DIASTOLIC BLOOD PRESSURE: 76 MMHG | HEIGHT: 64.5 IN | SYSTOLIC BLOOD PRESSURE: 128 MMHG | OXYGEN SATURATION: 96 % | BODY MASS INDEX: 33.39 KG/M2

## 2022-06-21 PROCEDURE — 93000 ELECTROCARDIOGRAM COMPLETE: CPT

## 2022-06-21 PROCEDURE — 99213 OFFICE O/P EST LOW 20 MIN: CPT

## 2022-06-21 NOTE — HISTORY OF PRESENT ILLNESS
[FreeTextEntry1] : 68 yo man, , father of one son. No prior history of heart disease. C/O chest pain since 1995 that has been evaluated in the past, atypical, constant, pressure like, associated with SOB, especially when moving the left arm. Was treated with NTG for a period (2000) with no response. \par In May 2018 underwent a nuclear stress test (8 METS) and there was no evidence of ischemia and echocardiogram revealed a normal LV function (55%) with mild dilatation of the aortic root. Not clear what tests he had done in the past.\par Lost to follow up. In 4/30/2021 he underwent PCI to the LAD/Diag with KEV x 4. LV function was normal.\par Still C/O occasional chest pain, stabbing in nature. More when moving the arms or lifting heavy objects. \par Echo done on 6/13/2021 revealed normal LV function. \par Stress test 6/2021 8 METS, normal LV functiion. No ischemia.  \par Still C/O chest pain, associated with any type of movement of the chest or by simply touching the chest/ribs, or with deep inspiration, radiates to the back. Worse also when he climbs stairs. Denies palpitations, dizziness or ankle swelling. No orthopnea or PND. \david Has DM since 2013 that is treated with oral meds. Last RwZ2n=4.5%\par Smoked for 30 years and stopped in 2013 \par Used to drink ETOH in the past. States that stopped in Jan 2021\david Has received the vaccine (Pfizer)

## 2022-06-21 NOTE — DISCUSSION/SUMMARY
[FreeTextEntry1] : Mr. ADÁN SHABAZZ is a 66 year male with known CAD. Underwent PCI to the LAD and Diag in 4/2021. Since then still C/O atypical chest pain, at the Lt costocondral articulation. Stress test and echo were WNL.\par I have recommended to continue the same medications.\par I also recommended lifestyle modification with weight loss and exercise.\par Will do routine blood work.\par Routine follow up in 6 months\par

## 2022-07-06 NOTE — H&P ADULT - NSHPLABSRESULTS_GEN_ALL_CORE
Diabetes Care Specialist Progress Note  Author: Phuong Gutierrez RN  Date: 7/6/2022    Program Intake  Reason for Diabetes Program Visit:: Initial Diabetes Assessment  Current diabetes risk level:: moderate  In the last 12 months, have you:: used emergency room services  Was the ER or hospital admission related to diabetes?: Yes  Permission to speak with others about care:: yes    Lab Results   Component Value Date    HGBA1C 10.6 (H) 06/22/2022       Clinical    Patient Health Rating  Compared to other people your age, how would you rate your health?: Fair    Problem Review  Reviewed Problem List with Patient: yes  Active comorbidities affecting diabetes self-care.: no  Reviewed health maintenance: yes    Clinical Assessment  Current Diabetes Treatment: Insulin, Injectable  Have you ever experienced hypoglycemia (low blood sugar)?: no  Have you ever experienced hyperglycemia (high blood sugar)?: yes  In the last month, how often have you experienced high blood sugar?: other (see comments) (New dx 6/2022)  Are you able to tell when your blood sugar is high?: Yes  What are your symptoms?: fatigue, thirst, frequent urination  Have you ever been hospitalized because your blood sugar was high?: yes (see comments)    Medication Information  How do you obtain your medications?: Patient drives  How many days a week do you miss your medications?: Never  Do you use a pill box or medication chart to help you manage your medications?: No  Do you sometimes have difficulty refilling your medications?: No  Medication adherence impacting ability to self-manage diabetes?: No         Nutritional Status  Diet: Diabetic diet  Meal Plan 24 Hour Recall: Breakfast, Lunch, Dinner, Snack  Meal Plan 24 Hour Recall - Breakfast: egg and veggies  Meal Plan 24 Hour Recall - Lunch: tuna sandwich  Meal Plan 24 Hour Recall - Dinner: shrimp and broccoli  Meal Plan 24 Hour Recall - Snack: Cashews  Change in appetite?: No  Dentation:: Intact  Recent  Changes in Weight: No Recent Weight Change  Current nutritional status an area of need that is impacting patient's ability to self-manage diabetes?: No    Additional Social History    Support  Does anyone support you with your diabetes care?: yes  Who supports you?: spouse, self  Who takes you to your medical appointments?: self  Does the current support meet the patient's needs?: Yes  Is Support an area impacting ability to self-manage diabetes?: No    Access to Mass Media & Technology  Does the patient have access to any of the following devices or technologies?: Smart phone  Media or technology needs impacting ability to self-manage diabetes?: No    Cognitive/Behavioral Health  Alert and Oriented: Yes  Difficulty Thinking: No  Requires Prompting: No  Requires assistance for routine expression?: No    Culture/Adventist  Culture or Anabaptism beliefs that may impact ability to access healthcare: No    Communication  Language preference: English  Hearing Problems: No  Vision Problems: No  Communication needs impacting ability to self-manage diabetes?: No    Health Literacy  How often do you need to have someone help you read instructions, pamphlets, or written material from your doctor or pharmacy?: Never  Health literacy needs impacting ability to self-manage diabetes?: No      Diabetes Self-Management Skills Assessment    Diabetes Disease Process/Treatment Options  Patient/caregiver able to state what happens when someone has diabetes.: yes  Patient/caregiver knows what type of diabetes they have.: no  Patient/caregiver able to identify at least three signs and symptoms of diabetes.: yes  Identified signs and symptoms:: fatigue, increased thirst, frequent urination  Patient able to identify at least three risk factors for diabetes.: no  Diabetes Disease Process/Treatment Options: Skills Assessment Completed: Yes  Assessment indicates:: Instruction Needed  Area of need?: Yes    Nutrition/Healthy Eating  Challenges to  healthy eating:: portion control  Method of carbohydrate measurement:: portion plate  Patient can identify foods that impact blood sugar.: no (see comments)  Nutrition/Healthy Eating Skills Assessment Completed:: Yes  Assessment indicates:: Instruction Needed  Area of need?: Yes    Physical Activity/Exercise  Patient's daily activity level:: sedentary  Patient formally exercises outside of work.: no  Reasons for not exercising:: other (see comments) (new dx EF of heart 25%)  Patient can identify forms of physical activity.: yes  Stated forms of physical activity:: other (see comments) (walking)  Patient can identify reasons why exercise/physical activity is important in diabetes management.: no  Physical Activity/Exercise Skills Assessment Completed: : Yes  Assessment indicates:: Instruction Needed, Knowledge deficit  Area of need?: Yes    Medications  Patient is able to describe current diabetes management routine.: yes  Diabetes management routine:: insulin, injectable medications (Trulicity 0.75mg every Monday, Levemir 40units Qhs, Humulin 6units tid with meals)  Patient is able to identify current diabetes medications, dosages, and appropriate timing of medications.: yes  Patient understands the purpose of the medications taken for diabetes.: yes  Patient reports problems or concerns with current medication regimen.: no  Medication Skills Assessment Completed:: Yes  Assessment indicates:: Adequate understanding  Area of need?: No    Home Blood Glucose Monitoring  Patient states that blood sugar is checked at home daily.: yes  Monitoring Method:: home glucometer  Home glucometer meter type:: contour next  How often do you check your blood sugar?: 4 times a day  When do you check your blood sugar?: Before breakfast, Before lunch, Before dinner, Before bedtime (was testing 6-8 times per day now down to four)  When you check what is your typical blood sugar range? :   Blood glucose logs:: yes, encouraged to  keep logs, encouraged to bring logs to provider visits  Blood glucose logs reviewed today?: yes  Home Blood Glucose Monitoring Skills Assessment Completed: : Yes  Assessment indicates:: Adequate understanding  Area of need?: No    Acute Complications  Patient is able to identify types of acute complications: No  Acute Complications Skills Assessment Completed: : Yes  Assessment indicates:: Instruction Needed, Knowledge deficit  Area of need?: Yes    Chronic Complications  Patient is taking statin?: Yes  Chronic Complications Skills Assessment Completed: : No  Deferred due to:: Time    Psychosocial/Coping  Patient can identify ways of coping with chronic disease.: yes  Patient-stated ways of coping with chronic disease:: support from loved ones  Psychosocial/Coping Skills Assessment Completed: : Yes  Area of need?: No      Diabetes Self Support Plan         Assessment Summary and Plan    Based on today's diabetes care assessment, the following areas of need were identified:      Social 7/6/2022   Support No   Access to [employer] No   Culture/Taoism No   Communication No   Health Literacy No        Clinical 7/6/2022   Medication Adherence No   Nutritional Status No        Diabetes Self-Management Skills 7/6/2022   Diabetes Disease Process/Treatment Options Yes   Nutrition/Healthy Eating Yes   Physical Activity/Exercise Yes   Medication No   Home Blood Glucose Monitoring No   Acute Complications Yes   Psychosocial/Coping No          Today's interventions were provided through individual discussion, instruction, and written materials were provided.      Patient verbalized understanding of instruction and written materials.  Pt was able to return back demonstration of instructions today. Patient understood key points, needs reinforcement and further instruction.     Diabetes Self-Management Care Plan:    Today's Diabetes Self-Management Care Plan was developed with Chadwick's input. Chadwick has agreed to work  toward the following goal(s) to improve his/her overall diabetes control.      Care Plan: Diabetes Management   Updates made since 6/6/2022 12:00 AM          Follow Up Plan     Follow up in about 4 weeks (around 8/3/2022) for glucose log and nutrition . New Dx in June 2022, A1C 10.6. Reviewed diabetes pathophysiology, different types of diabetes, signs and symptoms, risk factors and treatment guidelines. Reviewed benefits, techniques of self-monitoring blood glucose. Discussed appropriate timing and frequency to SMBG, education on parameters on when to notify provider and advised patient to bring logs to all appts with PCP/Endocrinologist/Diabetes Care Specialist. Discussed prevention, identification signs/symptoms and treatment of hyperglycemia and hypoglycemia and when to contact clinic.  Provided review of current diabetes medication action, dosage, frequency, and side effects.  Reviewed the importance of meal and medication timing with diabetes mediations for prevention of acute complications and maximum drug benefit. Reviewed the sources and role of Carbohydrate, Protein, and Fat and how each nutrient impact blood sugar. Provided meal-planning instruction via foodlists, plate method, food models, food label.  Discussed physical activity and impact on glucose.  He was working out at gym but has stopped and wants to wait until cardiac work up done due to weak heart muscle.  Denies hypoglycemia but now knows low is 70. Did not want to make goals today.  Next visit review glucose log and nutrition.      Today's care plan and follow up schedule was discussed with patient.  Chadwick verbalized understanding of the care plan, goals, and agrees to follow up plan.        The patient was encouraged to communicate with his/her health care provider/physician and care team regarding his/her condition(s) and treatment.  I provided the patient with my contact information today and encouraged to contact me via phone or  [facility] Patient Portal as needed.     Length of Visit   Total Time: 60 Minutes      EKG was reviewed, sinus rhythm at 81 beats per minute, no ST elevations    Chest Xray was reviewed, no acute pulmonary disease

## 2022-07-15 ENCOUNTER — NON-APPOINTMENT (OUTPATIENT)
Age: 67
End: 2022-07-15

## 2022-07-16 ENCOUNTER — NON-APPOINTMENT (OUTPATIENT)
Age: 67
End: 2022-07-16

## 2022-07-17 ENCOUNTER — TRANSCRIPTION ENCOUNTER (OUTPATIENT)
Age: 67
End: 2022-07-17

## 2022-07-19 ENCOUNTER — APPOINTMENT (OUTPATIENT)
Dept: DISASTER EMERGENCY | Facility: HOSPITAL | Age: 67
End: 2022-07-19

## 2022-07-19 ENCOUNTER — OUTPATIENT (OUTPATIENT)
Dept: OUTPATIENT SERVICES | Facility: HOSPITAL | Age: 67
LOS: 1 days | End: 2022-07-19

## 2022-07-19 VITALS
OXYGEN SATURATION: 97 % | HEART RATE: 89 BPM | SYSTOLIC BLOOD PRESSURE: 106 MMHG | RESPIRATION RATE: 17 BRPM | DIASTOLIC BLOOD PRESSURE: 66 MMHG | TEMPERATURE: 99 F

## 2022-07-19 VITALS
TEMPERATURE: 99 F | SYSTOLIC BLOOD PRESSURE: 102 MMHG | OXYGEN SATURATION: 97 % | DIASTOLIC BLOOD PRESSURE: 65 MMHG | RESPIRATION RATE: 17 BRPM | HEART RATE: 76 BPM

## 2022-07-19 DIAGNOSIS — U07.1 COVID-19: ICD-10-CM

## 2022-07-19 RX ORDER — BEBTELOVIMAB 87.5 MG/ML
175 INJECTION, SOLUTION INTRAVENOUS ONCE
Refills: 0 | Status: COMPLETED | OUTPATIENT
Start: 2022-07-19 | End: 2022-07-19

## 2022-07-19 RX ADMIN — BEBTELOVIMAB 175 MILLIGRAM(S): 87.5 INJECTION, SOLUTION INTRAVENOUS at 15:15

## 2022-07-19 NOTE — CHART NOTE - NSCHARTNOTEFT_GEN_A_CORE
CC: Monoclonal Antibody Administration/COVID 19 Positive      History: Patient presents for administration of monoclonal antibody  Patient has been screened and was deemed to be a candidate.    Exposure: wife COVID +    Symptoms/ Criteria: cough sore throat headache body aches    Inclusion Criteria: DM HTN CAD w / stents    Date of Positive Test: verified by clinical call center     Date of symptom onset: 7/16    Vaccine status:  Pfizer x 2  last 8/21    PMHx:  Infection due to severe acute respiratory syndrome coronavirus 2 (SARS-CoV-2)    No pertinent past medical history    DM (diabetes mellitus)    Stented coronary artery    No significant past surgical history          T(C): 37.2 (07-19-22 @ 15:30), Max: 37.3 (07-19-22 @ 15:10)  HR: 84 (07-19-22 @ 15:30) (84 - 89)  BP: 101/63 (07-19-22 @ 15:30) (101/63 - 106/66)  RR: 17 (07-19-22 @ 15:30) (17 - 17)  SpO2: 97% (07-19-22 @ 15:30) (97% - 97%)      PE:   Appearance: NAD	  HEENT:   Normal oral mucosa.   Lymphatic: No lymphadenopathy  Cardiovascular: Normal S1 S2, No JVD, No murmurs, No edema  Respiratory: Lungs clear to auscultation	  Gastrointestinal:  Soft, Non-tender. No guarding   Skin: warm and dry  Neurologic: Non-focal  Extremities: Normal range of motion.     ASSESSMENT:  Pt is a 67y year old Male with PMH DM HTN CAD w/ stents  Covid +  referred to the infusion center for Monoclonal antibody administration  (Bebtelovimab).        PLAN:  - Administration procedure explained to patient   - Consent for monoclonal antibody administration obtained   - Risk & benefits discussed/all questions answered  - Administer Bebtelovimab per protocol  - will observe patient for one hour post administration  and then if stable discharge home with outpt follow up as planned by PMD.        - Patient tolerated treatment well denies complaints of chest pain/SOB/dizziness/ palpitations  - VSS for discharge home  - D/C instructions given/ fact sheet included.  - Patient to follow-up with PCP as needed.

## 2022-07-25 ENCOUNTER — APPOINTMENT (OUTPATIENT)
Dept: FAMILY MEDICINE | Facility: CLINIC | Age: 67
End: 2022-07-25

## 2022-07-25 VITALS
WEIGHT: 194 LBS | HEART RATE: 83 BPM | TEMPERATURE: 98 F | OXYGEN SATURATION: 98 % | BODY MASS INDEX: 32.72 KG/M2 | HEIGHT: 64.5 IN | SYSTOLIC BLOOD PRESSURE: 120 MMHG | DIASTOLIC BLOOD PRESSURE: 70 MMHG | RESPIRATION RATE: 14 BRPM

## 2022-07-25 DIAGNOSIS — Z92.29 PERSONAL HISTORY OF OTHER DRUG THERAPY: ICD-10-CM

## 2022-07-25 DIAGNOSIS — Z01.818 ENCOUNTER FOR OTHER PREPROCEDURAL EXAMINATION: ICD-10-CM

## 2022-07-25 DIAGNOSIS — R06.02 SHORTNESS OF BREATH: ICD-10-CM

## 2022-07-25 DIAGNOSIS — Z87.19 PERSONAL HISTORY OF OTHER DISEASES OF THE DIGESTIVE SYSTEM: ICD-10-CM

## 2022-07-25 DIAGNOSIS — S19.9XXA UNSPECIFIED INJURY OF NECK, INITIAL ENCOUNTER: ICD-10-CM

## 2022-07-25 DIAGNOSIS — Z01.810 ENCOUNTER FOR PREPROCEDURAL CARDIOVASCULAR EXAMINATION: ICD-10-CM

## 2022-07-25 DIAGNOSIS — R10.13 EPIGASTRIC PAIN: ICD-10-CM

## 2022-07-25 DIAGNOSIS — Q74.0 OTHER CONGENITAL MALFORMATIONS OF UPPER LIMB(S), INCLUDING SHOULDER GIRDLE: ICD-10-CM

## 2022-07-25 DIAGNOSIS — R14.0 ABDOMINAL DISTENSION (GASEOUS): ICD-10-CM

## 2022-07-25 PROCEDURE — 99214 OFFICE O/P EST MOD 30 MIN: CPT

## 2022-08-24 LAB
ALBUMIN SERPL ELPH-MCNC: 4.5 G/DL
ALP BLD-CCNC: 110 U/L
ALT SERPL-CCNC: 26 U/L
ANION GAP SERPL CALC-SCNC: 18 MMOL/L
APO LP(A) SERPL-MCNC: 24 NMOL/L
AST SERPL-CCNC: 29 U/L
BASOPHILS # BLD AUTO: 0.05 K/UL
BASOPHILS NFR BLD AUTO: 0.6 %
BILIRUB DIRECT SERPL-MCNC: 0.2 MG/DL
BILIRUB INDIRECT SERPL-MCNC: 0.3 MG/DL
BILIRUB SERPL-MCNC: 0.5 MG/DL
BUN SERPL-MCNC: 12 MG/DL
CALCIUM SERPL-MCNC: 9.7 MG/DL
CHLORIDE SERPL-SCNC: 104 MMOL/L
CHOLEST SERPL-MCNC: 94 MG/DL
CK SERPL-CCNC: 317 U/L
CO2 SERPL-SCNC: 22 MMOL/L
CREAT SERPL-MCNC: 0.77 MG/DL
EGFR: 97 ML/MIN/1.73M2
EOSINOPHIL # BLD AUTO: 0.15 K/UL
EOSINOPHIL NFR BLD AUTO: 1.8 %
ESTIMATED AVERAGE GLUCOSE: 157 MG/DL
GLUCOSE SERPL-MCNC: 113 MG/DL
HBA1C MFR BLD HPLC: 7.1 %
HCT VFR BLD CALC: 43.1 %
HDLC SERPL-MCNC: 46 MG/DL
HGB BLD-MCNC: 13.7 G/DL
IMM GRANULOCYTES NFR BLD AUTO: 0.4 %
LDLC SERPL CALC-MCNC: 36 MG/DL
LYMPHOCYTES # BLD AUTO: 1.89 K/UL
LYMPHOCYTES NFR BLD AUTO: 22.2 %
MAN DIFF?: NORMAL
MCHC RBC-ENTMCNC: 27.7 PG
MCHC RBC-ENTMCNC: 31.8 GM/DL
MCV RBC AUTO: 87.1 FL
MONOCYTES # BLD AUTO: 0.68 K/UL
MONOCYTES NFR BLD AUTO: 8 %
NEUTROPHILS # BLD AUTO: 5.71 K/UL
NEUTROPHILS NFR BLD AUTO: 67 %
NONHDLC SERPL-MCNC: 47 MG/DL
PLATELET # BLD AUTO: 260 K/UL
POTASSIUM SERPL-SCNC: 4.5 MMOL/L
PROT SERPL-MCNC: 7.5 G/DL
RBC # BLD: 4.95 M/UL
RBC # FLD: 13.4 %
SODIUM SERPL-SCNC: 143 MMOL/L
TRIGL SERPL-MCNC: 59 MG/DL
TSH SERPL-ACNC: 1.33 UIU/ML
WBC # FLD AUTO: 8.51 K/UL

## 2022-09-06 ENCOUNTER — APPOINTMENT (OUTPATIENT)
Dept: FAMILY MEDICINE | Facility: CLINIC | Age: 67
End: 2022-09-06

## 2022-09-06 VITALS
TEMPERATURE: 97.2 F | SYSTOLIC BLOOD PRESSURE: 122 MMHG | RESPIRATION RATE: 16 BRPM | DIASTOLIC BLOOD PRESSURE: 78 MMHG | WEIGHT: 186 LBS | OXYGEN SATURATION: 98 % | HEIGHT: 64.5 IN | HEART RATE: 90 BPM | BODY MASS INDEX: 31.37 KG/M2

## 2022-09-06 DIAGNOSIS — T07.XXXA UNSPECIFIED MULTIPLE INJURIES, INITIAL ENCOUNTER: ICD-10-CM

## 2022-09-06 DIAGNOSIS — E66.3 OVERWEIGHT: ICD-10-CM

## 2022-09-06 DIAGNOSIS — Z12.11 ENCOUNTER FOR SCREENING FOR MALIGNANT NEOPLASM OF COLON: ICD-10-CM

## 2022-09-06 PROCEDURE — 36415 COLL VENOUS BLD VENIPUNCTURE: CPT

## 2022-09-06 PROCEDURE — 99214 OFFICE O/P EST MOD 30 MIN: CPT | Mod: 25

## 2022-09-06 RX ORDER — ERGOCALCIFEROL 1.25 MG/1
1.25 MG CAPSULE, LIQUID FILLED ORAL
Qty: 12 | Refills: 3 | Status: ACTIVE | COMMUNITY
Start: 2018-03-23 | End: 1900-01-01

## 2022-09-06 NOTE — PHYSICAL EXAM
[Well Nourished] : well nourished [No JVD] : no jugular venous distention [No Lymphadenopathy] : no lymphadenopathy [Supple] : supple [Thyroid Normal, No Nodules] : the thyroid was normal and there were no nodules present [No Respiratory Distress] : no respiratory distress  [No Accessory Muscle Use] : no accessory muscle use [Clear to Auscultation] : lungs were clear to auscultation bilaterally [Normal Rate] : normal rate  [Regular Rhythm] : with a regular rhythm [Normal S1, S2] : normal S1 and S2 [No Murmur] : no murmur heard [Soft] : abdomen soft [Non Tender] : non-tender [Non-distended] : non-distended [No HSM] : no HSM [Normal Bowel Sounds] : normal bowel sounds [No CVA Tenderness] : no CVA  tenderness [No Spinal Tenderness] : no spinal tenderness [No Joint Swelling] : no joint swelling [Grossly Normal Strength/Tone] : grossly normal strength/tone [Normal] : affect was normal and insight and judgment were intact [de-identified] : obese

## 2022-09-06 NOTE — ASSESSMENT
[FreeTextEntry1] : 66 y/o male with T2DM, ED, GERD, HLD, HTN, BPH, CAD, and obesity comes in for medication refill, patient complains of bruises all over his body and lower back pain.\par \par ENDO: h/o T2DM, ED\par -Last A1c  7.1 in July\par -Continue Januvia 100 mg daily, Metformin 1000 mg bid, Pioglitazone 15 mg daily.\par -Dietary changes and weight loss recommended.\par \par CVS: HTN, HLD, CAD\par -Cardiology Dr Trevino following\par -Diet and exercise discussed\par -Blood pressure well controlled, Continue Metoprolol 25 mg 1.5 mg bid, Amlodipine 5 mg daily, Atorvastatin 80 mg daily, ASA 81 mg daily, Brillinta 90 mg bid\par \par HEME: Bruising\par -Likely secondary to Brillinta, ASA use\par -Check CBC\par \par GI: GERD\par -Continue Pantoprazole prn\par -Referral for GI provided for Colon CA screening\par -FIOBT negative 7/2021\par \par : BPH\par -Stable\par \par MSK: Lower back pain\par -L/S Spine Xrays\par -Scoliosis Xray\par -Start Cyclobenzaprine at bedtime, side effects reviewed with pt\par \par HCM:\par -Blood work in house today\par -HIV / Hep C non reactive 7/2018\par -Covid Vaccinated\par -Recommend RTO for Flu vaccine.\par

## 2022-09-06 NOTE — HEALTH RISK ASSESSMENT
[Former] : Former [Never (0 pts)] : Never (0 points) [1 or 2 (0 pts)] : 1 or 2 (0 points) [No] : In the past 12 months have you used drugs other than those required for medical reasons? No [No falls in past year] : Patient reported no falls in the past year [0] : 2) Feeling down, depressed, or hopeless: Not at all (0) [PHQ-2 Negative - No further assessment needed] : PHQ-2 Negative - No further assessment needed [YearQuit] : 2012 [Audit-CScore] : 0 [[id number]] : 0

## 2022-11-02 ENCOUNTER — APPOINTMENT (OUTPATIENT)
Dept: GASTROENTEROLOGY | Facility: CLINIC | Age: 67
End: 2022-11-02

## 2022-11-02 VITALS
RESPIRATION RATE: 14 BRPM | DIASTOLIC BLOOD PRESSURE: 72 MMHG | SYSTOLIC BLOOD PRESSURE: 118 MMHG | OXYGEN SATURATION: 98 % | HEIGHT: 65 IN | BODY MASS INDEX: 31.65 KG/M2 | WEIGHT: 190 LBS | HEART RATE: 89 BPM

## 2022-11-02 DIAGNOSIS — R63.4 ABNORMAL WEIGHT LOSS: ICD-10-CM

## 2022-11-02 PROCEDURE — 99214 OFFICE O/P EST MOD 30 MIN: CPT

## 2022-11-02 NOTE — ASSESSMENT
[FreeTextEntry1] : The patient is having persistent GI symptoms.  I will recommend obtaining labs and stool studies.  He is having diarrhea and we need to rule out exocrine pancreatic insufficiency.  We will also perform CT abdomen.  After this above work-up, patient will need a colonoscopy.  At that time he will need a cardiology clearance for the colonoscopy.\par \par Rivas Clifford MD\par Gastroenterology \par \par

## 2022-11-02 NOTE — HISTORY OF PRESENT ILLNESS
[FreeTextEntry1] : Patient arrived for a follow-up.  He has been evaluated in the past for acid reflux and abdominal discomfort.  EGD was fairly unimpressive except for duodenal diverticulosis.  He is on PPI therapy.EGD biopsies were unimpressive.  He is now complaining of some diarrhea and abdominal discomfort that is persistent.  We do not have any recent imaging.  He had a remote colonoscopy.  He is complaining of bruising.

## 2022-11-02 NOTE — PHYSICAL EXAM

## 2022-11-11 NOTE — DISCHARGE NOTE PROVIDER - HOSPITAL COURSE
65M presented with complaints of chest discomfort. He reported a long-standing history of intermittent chest discomfort over may years. Most recently, the patient noted that he had more frequent bouts of chest pain which he described as sharp in quality and located mostly in the left chest. Initially he thought the pain was due to heavy lifting but he also noted the chest discomfort while walking up stairs which was associated with dyspnea and intermittent episodes of palpitations. He denied any associated lightheadedness or dizziness. He is unaware of any relieving factors except resting but noted that the chest discomfort would be recurrent with exertion. He had taken ibuprofen with mild relief. He noted a stress test a few years prior which was reported as normal. In the emergency department, the patient underwent a cardiac CT with elevated LAD calcium score.   Now s/p left heart catheterization via right radial approach with Dr. Bloom: S/P KEV to mLAD HOLA 3.0 x 12mm, KEV-D1 HOLA 2.25 x 8mm, HOLA 2.5 x 8mm, HOLA 3.0x 12mm  Pt tolerated procedure well.  Monitored overnight 129

## 2022-11-28 ENCOUNTER — RX RENEWAL (OUTPATIENT)
Age: 67
End: 2022-11-28

## 2022-12-03 ENCOUNTER — OUTPATIENT (OUTPATIENT)
Dept: OUTPATIENT SERVICES | Facility: HOSPITAL | Age: 67
LOS: 1 days | End: 2022-12-03
Payer: COMMERCIAL

## 2022-12-03 ENCOUNTER — APPOINTMENT (OUTPATIENT)
Dept: CT IMAGING | Facility: CLINIC | Age: 67
End: 2022-12-03

## 2022-12-03 DIAGNOSIS — Z00.00 ENCOUNTER FOR GENERAL ADULT MEDICAL EXAMINATION WITHOUT ABNORMAL FINDINGS: ICD-10-CM

## 2022-12-03 PROCEDURE — 74177 CT ABD & PELVIS W/CONTRAST: CPT | Mod: 26

## 2022-12-03 PROCEDURE — 74177 CT ABD & PELVIS W/CONTRAST: CPT

## 2022-12-09 NOTE — ED PROVIDER NOTE - CARE PLAN
PA is needed for new medication. Out of pocket is about 1300. Per PCP as discussed in visit today to take the Abilify as back up. Patient had questions about side effects of the Abilify which are SOB,Palpitations, hyperventilation. Per PCP she believes  her side effects are from her disease and not the medicine. Patient was advised to go  the Psych emergency if she feels unsafe if patient can not handle Abilify. Per PCP she would like her to try and continue the treatment but at a slower dose to feel more comfortable. If once every other day is too much then she can try to spread a little more apart. Principal Discharge DX:	Chest pain

## 2022-12-12 ENCOUNTER — RESULT CHARGE (OUTPATIENT)
Age: 67
End: 2022-12-12

## 2022-12-12 ENCOUNTER — APPOINTMENT (OUTPATIENT)
Dept: FAMILY MEDICINE | Facility: CLINIC | Age: 67
End: 2022-12-12

## 2022-12-12 VITALS
OXYGEN SATURATION: 98 % | HEART RATE: 85 BPM | SYSTOLIC BLOOD PRESSURE: 120 MMHG | BODY MASS INDEX: 31.65 KG/M2 | RESPIRATION RATE: 14 BRPM | WEIGHT: 190 LBS | DIASTOLIC BLOOD PRESSURE: 80 MMHG | TEMPERATURE: 97 F | HEIGHT: 65 IN

## 2022-12-12 DIAGNOSIS — K52.9 NONINFECTIVE GASTROENTERITIS AND COLITIS, UNSPECIFIED: ICD-10-CM

## 2022-12-12 DIAGNOSIS — Z23 ENCOUNTER FOR IMMUNIZATION: ICD-10-CM

## 2022-12-12 LAB — HBA1C MFR BLD HPLC: 5.9

## 2022-12-12 PROCEDURE — 83036 HEMOGLOBIN GLYCOSYLATED A1C: CPT | Mod: QW

## 2022-12-12 PROCEDURE — 90662 IIV NO PRSV INCREASED AG IM: CPT

## 2022-12-12 PROCEDURE — 99214 OFFICE O/P EST MOD 30 MIN: CPT | Mod: 25

## 2022-12-12 PROCEDURE — G0008: CPT

## 2022-12-12 PROCEDURE — 0124A: CPT

## 2022-12-12 RX ORDER — BLOOD SUGAR DIAGNOSTIC
STRIP MISCELLANEOUS DAILY
Qty: 1 | Refills: 1 | Status: ACTIVE | COMMUNITY
Start: 2021-08-23 | End: 1900-01-01

## 2022-12-12 NOTE — HISTORY OF PRESENT ILLNESS
[FreeTextEntry8] : 66 y/o male with T2DM, ED, GERD, HLD, HTN, BPH, CAD, and obesity comes in for medication refills and c/o lower back pain in the middle of his lower back, denies any falls, also c/o bruises all over his body, has been happening since September 2021.  [FreeTextEntry1] : diabetes check and med refills [de-identified] : 68 y/o male with T2DM, ED, GERD, HLD, HTN, BPH, CAD, and obesity comes in for medication refills and diabetes check.

## 2022-12-12 NOTE — HEALTH RISK ASSESSMENT
[Former] : Former [Never (0 pts)] : Never (0 points) [1 or 2 (0 pts)] : 1 or 2 (0 points) [No] : In the past 12 months have you used drugs other than those required for medical reasons? No [No falls in past year] : Patient reported no falls in the past year [0] : 2) Feeling down, depressed, or hopeless: Not at all (0) [PHQ-2 Negative - No further assessment needed] : PHQ-2 Negative - No further assessment needed [With Patient/Caregiver] : , with patient/caregiver [Aggressive treatment] : aggressive treatment [YearQuit] : 2012 [Audit-CScore] : 0 [[id number]] : 0 [AdvancecareDate] : 12/22

## 2022-12-12 NOTE — PHYSICAL EXAM
[Well Nourished] : well nourished [No JVD] : no jugular venous distention [No Lymphadenopathy] : no lymphadenopathy [Supple] : supple [Thyroid Normal, No Nodules] : the thyroid was normal and there were no nodules present [No Respiratory Distress] : no respiratory distress  [No Accessory Muscle Use] : no accessory muscle use [Clear to Auscultation] : lungs were clear to auscultation bilaterally [Normal Rate] : normal rate  [Regular Rhythm] : with a regular rhythm [Normal S1, S2] : normal S1 and S2 [No Murmur] : no murmur heard [Soft] : abdomen soft [Non Tender] : non-tender [Non-distended] : non-distended [No HSM] : no HSM [Normal Bowel Sounds] : normal bowel sounds [No CVA Tenderness] : no CVA  tenderness [No Spinal Tenderness] : no spinal tenderness [No Joint Swelling] : no joint swelling [Grossly Normal Strength/Tone] : grossly normal strength/tone [Normal] : affect was normal and insight and judgment were intact [de-identified] : obese

## 2022-12-12 NOTE — ASSESSMENT
[FreeTextEntry1] : 66 y/o male with T2DM, ED, GERD, HLD, HTN, BPH, CAD, and obesity comes in for medication refill, patient complains of bruises all over his body and lower back pain.\par \par ENDO: h/o T2DM, ED\par -A1c  7.1--> 7.3\par -Continue Januvia 100 mg daily, Metformin 1000 mg bid, Pioglitazone 15 mg daily.\par -Dietary changes and weight loss recommended.\par \par CVS: HTN, HLD, CAD\par -Cardiology Dr Trevino following\par -Diet and exercise discussed\par -Blood pressure well controlled, Continue Metoprolol 25 mg 1.5 mg bid, may increase to 2 tabs bid as he has trouble splitting tabs in half, Amlodipine 5 mg daily, Atorvastatin 80 mg daily, ASA 81 mg daily, Brillinta 90 mg bid\par \par GI: GERD, abdominal pain\par -Continue Pantoprazole prn\par -Following with GI Dr Clifford, needs to complete stool studies\par -FIOBT negative 7/2021\par \par : BPH\par -Stable\par \par MSK: Lower back pain\par -L/S Spine Xrays showed grade 1 anterior listhesis of L4 with respect to L5. Degenerative disc height loss.\par -Scoliosis Xray showed no scoliosis\par -Continue Cyclobenzaprine at bedtime, side effects reviewed with pt\par \par HCM:\par -Blood work in house today\par -HIV / Hep C non reactive 7/2018\par -Covid Vaccinated\par -Flu vaccine administered in house today.\par -Pfizer Bivalent booster administered in house today\par

## 2022-12-12 NOTE — REVIEW OF SYSTEMS
[Easy Bruising] : easy bruising [Negative] : Neurological [Abdominal Pain] : abdominal pain [FreeTextEntry6] : abdominal noise

## 2023-01-04 ENCOUNTER — APPOINTMENT (OUTPATIENT)
Dept: CARDIOLOGY | Facility: CLINIC | Age: 68
End: 2023-01-04
Payer: MEDICARE

## 2023-01-04 ENCOUNTER — NON-APPOINTMENT (OUTPATIENT)
Age: 68
End: 2023-01-04

## 2023-01-04 VITALS
HEIGHT: 65 IN | BODY MASS INDEX: 30.99 KG/M2 | DIASTOLIC BLOOD PRESSURE: 78 MMHG | SYSTOLIC BLOOD PRESSURE: 116 MMHG | TEMPERATURE: 99.1 F | WEIGHT: 186 LBS | OXYGEN SATURATION: 97 % | HEART RATE: 87 BPM

## 2023-01-04 DIAGNOSIS — K21.9 GASTRO-ESOPHAGEAL REFLUX DISEASE W/OUT ESOPHAGITIS: ICD-10-CM

## 2023-01-04 PROCEDURE — 93000 ELECTROCARDIOGRAM COMPLETE: CPT

## 2023-01-04 PROCEDURE — 99214 OFFICE O/P EST MOD 30 MIN: CPT

## 2023-01-04 NOTE — REVIEW OF SYSTEMS
[Abdominal Pain] : abdominal pain [Heartburn] : heartburn [Negative] : Heme/Lymph [Weight Loss (___ Lbs)] : [unfilled] ~Ulb weight loss [Chest Discomfort] : chest discomfort [Palpitations] : palpitations [Nausea] : nausea [Diarrhea] : diarrhea [Change in Appetite] : no change in appetite [Constipation] : no constipation

## 2023-01-04 NOTE — DISCUSSION/SUMMARY
[FreeTextEntry1] : Mr. ADÁN SHABAZZ is a 67 year male with known CAD. Underwent PCI to the LAD and Diag in 4/2021. Since then still C/O atypical chest pain, at the Lt costocondral articulation. Stress test and echo were WNL.\par I have recommended to continue the same medications.\par Requested an nuclear stress test and echocardiogram in view of the recurrent symptoms and known CAD and possible enlarged aorta. \par I also recommended lifestyle modification with weight loss and exercise.\par Will do routine blood work.\par Routine follow up in 6 months\par

## 2023-01-07 ENCOUNTER — EMERGENCY (EMERGENCY)
Facility: HOSPITAL | Age: 68
LOS: 1 days | Discharge: HOME | End: 2023-01-07
Attending: EMERGENCY MEDICINE
Payer: COMMERCIAL

## 2023-01-07 VITALS
TEMPERATURE: 98 F | HEIGHT: 66 IN | DIASTOLIC BLOOD PRESSURE: 77 MMHG | SYSTOLIC BLOOD PRESSURE: 124 MMHG | RESPIRATION RATE: 20 BRPM | HEART RATE: 86 BPM | OXYGEN SATURATION: 98 % | WEIGHT: 190.04 LBS

## 2023-01-07 VITALS
RESPIRATION RATE: 16 BRPM | OXYGEN SATURATION: 99 % | SYSTOLIC BLOOD PRESSURE: 136 MMHG | HEART RATE: 86 BPM | DIASTOLIC BLOOD PRESSURE: 73 MMHG

## 2023-01-07 LAB
ALBUMIN SERPL ELPH-MCNC: 4.4 G/DL — SIGNIFICANT CHANGE UP (ref 3.3–5.2)
ALP SERPL-CCNC: 107 U/L — SIGNIFICANT CHANGE UP (ref 40–120)
ALT FLD-CCNC: 20 U/L — SIGNIFICANT CHANGE UP
ANION GAP SERPL CALC-SCNC: 10 MMOL/L — SIGNIFICANT CHANGE UP (ref 5–17)
AST SERPL-CCNC: 33 U/L — SIGNIFICANT CHANGE UP
BASOPHILS # BLD AUTO: 0.05 K/UL — SIGNIFICANT CHANGE UP (ref 0–0.2)
BASOPHILS NFR BLD AUTO: 0.8 % — SIGNIFICANT CHANGE UP (ref 0–2)
BILIRUB SERPL-MCNC: 0.5 MG/DL — SIGNIFICANT CHANGE UP (ref 0.4–2)
BUN SERPL-MCNC: 11.3 MG/DL — SIGNIFICANT CHANGE UP (ref 8–20)
CALCIUM SERPL-MCNC: 9.7 MG/DL — SIGNIFICANT CHANGE UP (ref 8.4–10.5)
CHLORIDE SERPL-SCNC: 101 MMOL/L — SIGNIFICANT CHANGE UP (ref 96–108)
CO2 SERPL-SCNC: 28 MMOL/L — SIGNIFICANT CHANGE UP (ref 22–29)
CREAT SERPL-MCNC: 0.61 MG/DL — SIGNIFICANT CHANGE UP (ref 0.5–1.3)
EGFR: 104 ML/MIN/1.73M2 — SIGNIFICANT CHANGE UP
EOSINOPHIL # BLD AUTO: 0.15 K/UL — SIGNIFICANT CHANGE UP (ref 0–0.5)
EOSINOPHIL NFR BLD AUTO: 2.3 % — SIGNIFICANT CHANGE UP (ref 0–6)
FLUAV AG NPH QL: SIGNIFICANT CHANGE UP
FLUBV AG NPH QL: SIGNIFICANT CHANGE UP
GLUCOSE SERPL-MCNC: 144 MG/DL — HIGH (ref 70–99)
HCT VFR BLD CALC: 40.3 % — SIGNIFICANT CHANGE UP (ref 39–50)
HGB BLD-MCNC: 13.4 G/DL — SIGNIFICANT CHANGE UP (ref 13–17)
IMM GRANULOCYTES NFR BLD AUTO: 0.5 % — SIGNIFICANT CHANGE UP (ref 0–0.9)
LIDOCAIN IGE QN: 39 U/L — SIGNIFICANT CHANGE UP (ref 22–51)
LYMPHOCYTES # BLD AUTO: 1.58 K/UL — SIGNIFICANT CHANGE UP (ref 1–3.3)
LYMPHOCYTES # BLD AUTO: 24.1 % — SIGNIFICANT CHANGE UP (ref 13–44)
MCHC RBC-ENTMCNC: 28.7 PG — SIGNIFICANT CHANGE UP (ref 27–34)
MCHC RBC-ENTMCNC: 33.3 GM/DL — SIGNIFICANT CHANGE UP (ref 32–36)
MCV RBC AUTO: 86.3 FL — SIGNIFICANT CHANGE UP (ref 80–100)
MONOCYTES # BLD AUTO: 0.5 K/UL — SIGNIFICANT CHANGE UP (ref 0–0.9)
MONOCYTES NFR BLD AUTO: 7.6 % — SIGNIFICANT CHANGE UP (ref 2–14)
NEUTROPHILS # BLD AUTO: 4.25 K/UL — SIGNIFICANT CHANGE UP (ref 1.8–7.4)
NEUTROPHILS NFR BLD AUTO: 64.7 % — SIGNIFICANT CHANGE UP (ref 43–77)
NT-PROBNP SERPL-SCNC: 80 PG/ML — SIGNIFICANT CHANGE UP (ref 0–300)
PLATELET # BLD AUTO: 262 K/UL — SIGNIFICANT CHANGE UP (ref 150–400)
POTASSIUM SERPL-MCNC: 4.5 MMOL/L — SIGNIFICANT CHANGE UP (ref 3.5–5.3)
POTASSIUM SERPL-SCNC: 4.5 MMOL/L — SIGNIFICANT CHANGE UP (ref 3.5–5.3)
PROT SERPL-MCNC: 8.3 G/DL — SIGNIFICANT CHANGE UP (ref 6.6–8.7)
RBC # BLD: 4.67 M/UL — SIGNIFICANT CHANGE UP (ref 4.2–5.8)
RBC # FLD: 12.8 % — SIGNIFICANT CHANGE UP (ref 10.3–14.5)
RSV RNA NPH QL NAA+NON-PROBE: SIGNIFICANT CHANGE UP
SARS-COV-2 RNA SPEC QL NAA+PROBE: SIGNIFICANT CHANGE UP
SODIUM SERPL-SCNC: 139 MMOL/L — SIGNIFICANT CHANGE UP (ref 135–145)
TROPONIN T SERPL-MCNC: <0.01 NG/ML — SIGNIFICANT CHANGE UP (ref 0–0.06)
TROPONIN T SERPL-MCNC: <0.01 NG/ML — SIGNIFICANT CHANGE UP (ref 0–0.06)
WBC # BLD: 6.56 K/UL — SIGNIFICANT CHANGE UP (ref 3.8–10.5)
WBC # FLD AUTO: 6.56 K/UL — SIGNIFICANT CHANGE UP (ref 3.8–10.5)

## 2023-01-07 PROCEDURE — 93005 ELECTROCARDIOGRAM TRACING: CPT

## 2023-01-07 PROCEDURE — 85025 COMPLETE CBC W/AUTO DIFF WBC: CPT

## 2023-01-07 PROCEDURE — 99285 EMERGENCY DEPT VISIT HI MDM: CPT

## 2023-01-07 PROCEDURE — 84484 ASSAY OF TROPONIN QUANT: CPT

## 2023-01-07 PROCEDURE — 36415 COLL VENOUS BLD VENIPUNCTURE: CPT

## 2023-01-07 PROCEDURE — 71045 X-RAY EXAM CHEST 1 VIEW: CPT | Mod: 26

## 2023-01-07 PROCEDURE — 80053 COMPREHEN METABOLIC PANEL: CPT

## 2023-01-07 PROCEDURE — 99285 EMERGENCY DEPT VISIT HI MDM: CPT | Mod: 25

## 2023-01-07 PROCEDURE — 83690 ASSAY OF LIPASE: CPT

## 2023-01-07 PROCEDURE — 71045 X-RAY EXAM CHEST 1 VIEW: CPT

## 2023-01-07 PROCEDURE — 87637 SARSCOV2&INF A&B&RSV AMP PRB: CPT

## 2023-01-07 PROCEDURE — 93010 ELECTROCARDIOGRAM REPORT: CPT

## 2023-01-07 PROCEDURE — 83880 ASSAY OF NATRIURETIC PEPTIDE: CPT

## 2023-01-07 RX ORDER — ACETAMINOPHEN 500 MG
650 TABLET ORAL ONCE
Refills: 0 | Status: COMPLETED | OUTPATIENT
Start: 2023-01-07 | End: 2023-01-07

## 2023-01-07 RX ADMIN — Medication 650 MILLIGRAM(S): at 10:52

## 2023-01-07 NOTE — ED PROVIDER NOTE - PATIENT PORTAL LINK FT
You can access the FollowMyHealth Patient Portal offered by [facility] by registering at the following website: [email or web address]. By joining [facility]’s FollowMyHealth portal, you will also be able to view your health information using other applications (apps) compatible with our system.

## 2023-01-07 NOTE — ED PROVIDER NOTE - PHYSICAL EXAMINATION
Gen: No acute distress, non toxic  HEENT: Mucous membranes moist, pink conjunctivae, EOMI  CV: RRR, nl s1/s2.  Resp: CTAB, normal rate and effort  GI: Abdomen soft, NT, ND. No rebound, no guarding  : No CVAT  Neuro: A&O x 3, moving all 4 extremities  MSK: No spine or joint tenderness to palpation. reproducible ant chest wall ttp.  Skin: No rashes. intact and perfused.

## 2023-01-07 NOTE — ED PROVIDER NOTE - CLINICAL SUMMARY MEDICAL DECISION MAKING FREE TEXT BOX
66 y/o male hx dm, CAD with multiple stents in 2021 follows with Dr. Bloom c/o constant sharp chest pain that hurts with palpation since last night (here similar last visit). Denies sob, no exertional component. no leg pain/swelling. has echo schedued for 1/17 and stress test for 1/23 (saw Dr. Bloom this tuesday). well appearing, ekg unremarkable. check troponins and cxr. 66 y/o male hx dm, CAD with multiple stents in 2021 follows with Dr. Bloom c/o constant sharp chest pain that hurts with palpation since last night (here similar last visit). Denies sob, no exertional component. no leg pain/swelling. has echo schedued for 1/17 and stress test for 1/23 (saw Dr. Bloom this tuesday). well appearing, ekg unremarkable. check troponins and cxr.    [place]: pt feels well, has echo/stress coming up. reproducible chest discomfort. xr wnl. trop x 2 neg. return precautions.

## 2023-01-07 NOTE — ED PROVIDER NOTE - OBJECTIVE STATEMENT
68 y/o male hx dm, CAD with multiple stents in 2021 follows with Dr. Bloom c/o constant sharp chest pain that hurts with palpation since last night (here similar last visit). Denies sob, no exertional component. no leg pain/swelling. has echo schedued for 1/17 and stress test for 1/23 (saw Dr. Bloom this tuesday). Also has intermittent chronic diarrhea he is following with Dr. Clifford for, no active diarrhea or abd pain, had neg ct recently per pt.

## 2023-01-17 ENCOUNTER — APPOINTMENT (OUTPATIENT)
Dept: CARDIOLOGY | Facility: CLINIC | Age: 68
End: 2023-01-17
Payer: MEDICARE

## 2023-01-17 PROCEDURE — 93306 TTE W/DOPPLER COMPLETE: CPT

## 2023-01-26 ENCOUNTER — APPOINTMENT (OUTPATIENT)
Dept: CARDIOLOGY | Facility: CLINIC | Age: 68
End: 2023-01-26
Payer: MEDICARE

## 2023-01-26 PROCEDURE — 93015 CV STRESS TEST SUPVJ I&R: CPT

## 2023-01-26 PROCEDURE — 78452 HT MUSCLE IMAGE SPECT MULT: CPT

## 2023-01-26 PROCEDURE — A9500: CPT

## 2023-03-13 ENCOUNTER — APPOINTMENT (OUTPATIENT)
Dept: FAMILY MEDICINE | Facility: CLINIC | Age: 68
End: 2023-03-13
Payer: MEDICARE

## 2023-03-13 VITALS
HEIGHT: 65 IN | WEIGHT: 188 LBS | RESPIRATION RATE: 14 BRPM | BODY MASS INDEX: 31.32 KG/M2 | HEART RATE: 86 BPM | OXYGEN SATURATION: 96 % | SYSTOLIC BLOOD PRESSURE: 112 MMHG | TEMPERATURE: 98.3 F | DIASTOLIC BLOOD PRESSURE: 70 MMHG

## 2023-03-13 DIAGNOSIS — K42.9 UMBILICAL HERNIA W/OUT OBSTRUCTION OR GANGRENE: ICD-10-CM

## 2023-03-13 DIAGNOSIS — R10.9 UNSPECIFIED ABDOMINAL PAIN: ICD-10-CM

## 2023-03-13 DIAGNOSIS — N52.9 MALE ERECTILE DYSFUNCTION, UNSPECIFIED: ICD-10-CM

## 2023-03-13 DIAGNOSIS — R10.30 LOWER ABDOMINAL PAIN, UNSPECIFIED: ICD-10-CM

## 2023-03-13 PROCEDURE — 99214 OFFICE O/P EST MOD 30 MIN: CPT | Mod: 25

## 2023-03-13 NOTE — HEALTH RISK ASSESSMENT
[1 or 2 (0 pts)] : 1 or 2 (0 points) [No] : In the past 12 months have you used drugs other than those required for medical reasons? No [No falls in past year] : Patient reported no falls in the past year [0] : 2) Feeling down, depressed, or hopeless: Not at all (0) [PHQ-2 Negative - No further assessment needed] : PHQ-2 Negative - No further assessment needed [With Patient/Caregiver] : , with patient/caregiver [Aggressive treatment] : aggressive treatment [Yes] : Yes [Monthly or less (1 pt)] : Monthly or less (1 point) [Never] : Never [Audit-CScore] : 1 [[id number]] : 0 [AdvancecareDate] : 03/23

## 2023-03-13 NOTE — HISTORY OF PRESENT ILLNESS
[FreeTextEntry1] : diabetes check and med refills [de-identified] : 66 y/o male with T2DM, ED, GERD, HLD, HTN, BPH, CAD, and obesity comes in for medication refills and diabetes check.

## 2023-03-13 NOTE — PHYSICAL EXAM
[Well Nourished] : well nourished [No JVD] : no jugular venous distention [No Lymphadenopathy] : no lymphadenopathy [Supple] : supple [Thyroid Normal, No Nodules] : the thyroid was normal and there were no nodules present [No Respiratory Distress] : no respiratory distress  [No Accessory Muscle Use] : no accessory muscle use [Clear to Auscultation] : lungs were clear to auscultation bilaterally [Normal Rate] : normal rate  [Regular Rhythm] : with a regular rhythm [Normal S1, S2] : normal S1 and S2 [No Murmur] : no murmur heard [Soft] : abdomen soft [Non Tender] : non-tender [Non-distended] : non-distended [No HSM] : no HSM [Normal Bowel Sounds] : normal bowel sounds [No CVA Tenderness] : no CVA  tenderness [No Spinal Tenderness] : no spinal tenderness [No Joint Swelling] : no joint swelling [Grossly Normal Strength/Tone] : grossly normal strength/tone [Normal] : affect was normal and insight and judgment were intact [de-identified] : obese

## 2023-04-04 ENCOUNTER — NON-APPOINTMENT (OUTPATIENT)
Age: 68
End: 2023-04-04

## 2023-04-06 ENCOUNTER — NON-APPOINTMENT (OUTPATIENT)
Age: 68
End: 2023-04-06

## 2023-04-08 ENCOUNTER — APPOINTMENT (OUTPATIENT)
Dept: ULTRASOUND IMAGING | Facility: CLINIC | Age: 68
End: 2023-04-08
Payer: MEDICARE

## 2023-04-08 ENCOUNTER — OUTPATIENT (OUTPATIENT)
Dept: OUTPATIENT SERVICES | Facility: HOSPITAL | Age: 68
LOS: 1 days | End: 2023-04-08
Payer: COMMERCIAL

## 2023-04-08 ENCOUNTER — APPOINTMENT (OUTPATIENT)
Dept: CT IMAGING | Facility: CLINIC | Age: 68
End: 2023-04-08

## 2023-04-08 DIAGNOSIS — R10.30 LOWER ABDOMINAL PAIN, UNSPECIFIED: ICD-10-CM

## 2023-04-08 PROCEDURE — 76856 US EXAM PELVIC COMPLETE: CPT

## 2023-04-08 PROCEDURE — 76856 US EXAM PELVIC COMPLETE: CPT | Mod: 26

## 2023-04-10 ENCOUNTER — RX RENEWAL (OUTPATIENT)
Age: 68
End: 2023-04-10

## 2023-04-18 LAB
ALBUMIN SERPL ELPH-MCNC: 4.6 G/DL
ALP BLD-CCNC: 115 U/L
ALT SERPL-CCNC: 19 U/L
ANION GAP SERPL CALC-SCNC: 13 MMOL/L
AST SERPL-CCNC: 24 U/L
BILIRUB SERPL-MCNC: 0.5 MG/DL
BUN SERPL-MCNC: 15 MG/DL
CALCIUM SERPL-MCNC: 9.9 MG/DL
CHLORIDE SERPL-SCNC: 103 MMOL/L
CO2 SERPL-SCNC: 26 MMOL/L
CREAT SERPL-MCNC: 0.69 MG/DL
EGFR: 100 ML/MIN/1.73M2
ESTIMATED AVERAGE GLUCOSE: 126 MG/DL
GLUCOSE SERPL-MCNC: 110 MG/DL
HBA1C MFR BLD HPLC: 6 %
POTASSIUM SERPL-SCNC: 4.3 MMOL/L
PROT SERPL-MCNC: 7.6 G/DL
PSA SERPL-MCNC: 0.13 NG/ML
SODIUM SERPL-SCNC: 142 MMOL/L

## 2023-05-23 ENCOUNTER — APPOINTMENT (OUTPATIENT)
Dept: FAMILY MEDICINE | Facility: CLINIC | Age: 68
End: 2023-05-23
Payer: MEDICARE

## 2023-05-23 ENCOUNTER — RESULT CHARGE (OUTPATIENT)
Age: 68
End: 2023-05-23

## 2023-05-23 VITALS
DIASTOLIC BLOOD PRESSURE: 70 MMHG | RESPIRATION RATE: 14 BRPM | HEIGHT: 65 IN | OXYGEN SATURATION: 98 % | TEMPERATURE: 97.5 F | HEART RATE: 95 BPM | WEIGHT: 190 LBS | BODY MASS INDEX: 31.65 KG/M2 | SYSTOLIC BLOOD PRESSURE: 120 MMHG

## 2023-05-23 DIAGNOSIS — N52.9 MALE ERECTILE DYSFUNCTION, UNSPECIFIED: ICD-10-CM

## 2023-05-23 DIAGNOSIS — R10.2 PELVIC AND PERINEAL PAIN: ICD-10-CM

## 2023-05-23 PROCEDURE — 99214 OFFICE O/P EST MOD 30 MIN: CPT | Mod: 25

## 2023-05-23 PROCEDURE — 83036 HEMOGLOBIN GLYCOSYLATED A1C: CPT | Mod: QW

## 2023-05-23 RX ORDER — METFORMIN HYDROCHLORIDE 500 MG/1
500 TABLET, COATED ORAL TWICE DAILY
Qty: 180 | Refills: 3 | Status: ACTIVE | COMMUNITY
Start: 1900-01-01 | End: 1900-01-01

## 2023-05-23 RX ORDER — PANTOPRAZOLE 40 MG/1
40 TABLET, DELAYED RELEASE ORAL
Qty: 60 | Refills: 2 | Status: ACTIVE | COMMUNITY
Start: 2021-08-18

## 2023-05-23 RX ORDER — MELOXICAM 15 MG/1
15 TABLET ORAL
Qty: 30 | Refills: 1 | Status: COMPLETED | COMMUNITY
Start: 2020-12-16 | End: 2023-05-23

## 2023-05-23 RX ORDER — CYCLOBENZAPRINE HYDROCHLORIDE 5 MG/1
5 TABLET, FILM COATED ORAL
Qty: 15 | Refills: 2 | Status: ACTIVE | COMMUNITY
Start: 2022-09-06 | End: 1900-01-01

## 2023-05-24 PROBLEM — N52.9 ED (ERECTILE DYSFUNCTION): Status: ACTIVE | Noted: 2018-03-15

## 2023-05-24 PROBLEM — R10.2 PELVIC PAIN IN MALE: Status: ACTIVE | Noted: 2023-03-13

## 2023-05-24 NOTE — HEALTH RISK ASSESSMENT
[Yes] : Yes [Monthly or less (1 pt)] : Monthly or less (1 point) [1 or 2 (0 pts)] : 1 or 2 (0 points) [No] : In the past 12 months have you used drugs other than those required for medical reasons? No [No falls in past year] : Patient reported no falls in the past year [0] : 2) Feeling down, depressed, or hopeless: Not at all (0) [PHQ-2 Negative - No further assessment needed] : PHQ-2 Negative - No further assessment needed [With Patient/Caregiver] : , with patient/caregiver [Aggressive treatment] : aggressive treatment [Never] : Never [Audit-CScore] : 1 [[id number]] : 0 [AdvancecareDate] : 03/23

## 2023-05-24 NOTE — HISTORY OF PRESENT ILLNESS
[FreeTextEntry1] : diabetes check and med refills [de-identified] : 68 y/o male with T2DM, ED, GERD, HLD, HTN, BPH, CAD, and obesity comes in for medication refills and diabetes check. Pt c/o lower back pain which is not new.

## 2023-05-24 NOTE — PHYSICAL EXAM
[Well Nourished] : well nourished [No JVD] : no jugular venous distention [No Lymphadenopathy] : no lymphadenopathy [Supple] : supple [Thyroid Normal, No Nodules] : the thyroid was normal and there were no nodules present [No Respiratory Distress] : no respiratory distress  [No Accessory Muscle Use] : no accessory muscle use [Clear to Auscultation] : lungs were clear to auscultation bilaterally [Normal Rate] : normal rate  [Regular Rhythm] : with a regular rhythm [Normal S1, S2] : normal S1 and S2 [No Murmur] : no murmur heard [Soft] : abdomen soft [Non Tender] : non-tender [Non-distended] : non-distended [No HSM] : no HSM [Normal Bowel Sounds] : normal bowel sounds [No CVA Tenderness] : no CVA  tenderness [No Spinal Tenderness] : no spinal tenderness [No Joint Swelling] : no joint swelling [Grossly Normal Strength/Tone] : grossly normal strength/tone [Normal] : affect was normal and insight and judgment were intact [de-identified] : obese

## 2023-05-24 NOTE — REVIEW OF SYSTEMS
[Abdominal Pain] : abdominal pain [Easy Bruising] : easy bruising [Negative] : Neurological [FreeTextEntry6] : abdominal noise [Back Pain] : back pain

## 2023-05-24 NOTE — ASSESSMENT
[FreeTextEntry1] : 66 y/o male with T2DM, ED, GERD, HLD, HTN, BPH, CAD, and obesity comes in for medication refill, patient complains of lower back pain and needs diabetes check\par \par ENDO: h/o T2DM, ED\par -A1c  7.1--> 7.3 --> 5.9 --> 6.0 --> 5.6 POCT today\par -Continue Januvia 100 mg daily, Metformin decreased to 500 mg bid, Pioglitazone 15 mg daily.\par -Dietary changes and weight loss recommended.\par \par CVS: HTN, HLD, CAD\par -Cardiology Dr Trevino following\par -Diet and exercise discussed\par -Blood pressure well controlled, Continue Metoprolol 50 mg,  Amlodipine 5 mg daily, Atorvastatin 80 mg daily, ASA 81 mg daily, Brillinta 90 mg bid\par \par GI: GERD, abdominal pain, small bilateral inguinal hernias\par -CT AP scan results as above\par -Continue Pantoprazole prn\par -Following with GI Dr Clifford.\par -FIOBT negative 7/2021\par \par : BPH, ED\par -Continue Start Tamsulosin 0.4 mg daily at bedtime\par -Sildenafil 20 mg tabs\par \par MSK: Lower back pain\par -L/S Spine Xrays showed grade 1 anterior listhesis of L4 with respect to L5. Degenerative disc height loss.\par -Scoliosis Xray showed no scoliosis\par -Continue Cyclobenzaprine at bedtime, side effects reviewed with pt.\par -Spine center referral provided with Dr Frost\par \par HCM:\par -HIV / Hep C non reactive 7/2018\par -Covid Vaccinated\par -Flu vaccine administered 12/22\par -Pfizer Bivalent booster administered 12/22\par

## 2023-05-30 LAB — HBA1C MFR BLD HPLC: 5.6

## 2023-06-03 ENCOUNTER — APPOINTMENT (OUTPATIENT)
Dept: ORTHOPEDIC SURGERY | Facility: CLINIC | Age: 68
End: 2023-06-03
Payer: MEDICARE

## 2023-06-03 VITALS
HEART RATE: 80 BPM | DIASTOLIC BLOOD PRESSURE: 84 MMHG | HEIGHT: 66 IN | TEMPERATURE: 98.1 F | BODY MASS INDEX: 30.53 KG/M2 | WEIGHT: 190 LBS | SYSTOLIC BLOOD PRESSURE: 131 MMHG

## 2023-06-03 DIAGNOSIS — M53.9 DORSOPATHY, UNSPECIFIED: ICD-10-CM

## 2023-06-03 DIAGNOSIS — M76.822 POSTERIOR TIBIAL TENDINITIS, LEFT LEG: ICD-10-CM

## 2023-06-03 PROCEDURE — 72100 X-RAY EXAM L-S SPINE 2/3 VWS: CPT

## 2023-06-03 PROCEDURE — 99214 OFFICE O/P EST MOD 30 MIN: CPT

## 2023-06-03 PROCEDURE — 73610 X-RAY EXAM OF ANKLE: CPT | Mod: LT

## 2023-06-03 NOTE — DISCUSSION/SUMMARY
[de-identified] : At this time want to treat the lower back and left ankle conservatively with physical therapy.  I also gave the patient an ASO brace for his left ankle.  I explained to him the importance of proper shoewear and arch support shoes and not walking around barefoot.  I want to hold off on advanced imaging for the back and the ankle.  I prescribed the patient enteric-coated Naprosyn 500 mg to take as directed for the next 2 to 4 weeks to try and help with the pains in the lower back and the ankle.  Patient is a diabetic, no steroid use.  If the back or ankle pain does not improve in 6 to 8 weeks after trying physical therapy and anti-inflammatories, I would like the patient to give me a call and I can perform a TTM and at that point in time I would consider advanced imaging.  All of his questions were answered.  He understood and agreed to the treatment course.

## 2023-06-03 NOTE — PHYSICAL EXAM
[de-identified] : Lumbosacral Physical Examination: \par \par General: Alert and oriented x3.  In no acute distress.  Pleasant in nature with a normal affect.  No apparent respiratory distress. \par Inspection: No swelling, No scoliosis present.\par \par ROM:\par Forward Flexion: 70 degrees\par Extension: 20 degrees\par Lateral Flexion to Left: 30 degrees\par Lateral Flexion to Right: 30 degrees\par Rotation to Left: 40 degrees\par Rotation to Right: 40 degrees\par \par Normal Hip ROM.\par \par Palpation: \par Thoracolumbar Paraspinal Muscles: Positive\par Costovertebral Angle Pain/Spine Percussion: Negative for pain over the Kidney's with Spine Percussion.\par \par Special Tests:\par Straight Leg Raise: Negative\par \par Neurovascularly Intact Sensory and Motor with No Foot Drop present on examination today. \par \par \par Left ankle Physical Examination:\par \par General: Alert and oriented x3.  In no acute distress.  Pleasant in nature with a normal affect.  No apparent respiratory distress. \par Erythema, Warmth, Rubor: Negative\par Swelling: Positive\par \par ROM:\par 1. Dorsiflexion: 10 degrees\par 2. Plantarflexion: 40 degrees\par 3. Inversion: 30 degrees\par 4. Eversion: 20 degrees\par \par Tenderness to Palpation: \par 1. Lateral Malleolus: Negative\par 2. Medial Malleolus: Negative\par 3. Proximal Fibular Pain: Negative\par 4. Heel Pain: Negative\par 5. Cuboid: Negative\par 6. Navicular: Negative\par 7. Tibiotalar Joint: Negative\par 8. Subtalar Joint: Negative\par 9. Posterior Recess: Negative\par \par Tendon Pain:\par 1. Achilles: Negative\par 2. Peroneals: Negative\par 3. Posterior Tibialis: Positive\par 4. Tibialis Anterior: Negative\par \par Ligament Pain:\par 1. ATFL: Negative\par 2. CFL: Negative \par 3. PTFL: Negative\par 4. Deltoid Ligaments: Negative\par 5. Lis Franc Ligament: Negative\par \par Stability: \par 1. Anterior Drawer: Negative\par 2. Posterior Drawer: Negative\par \par Strength: 5/5 TA/GS/EHL\par \par Pulses: 2+ DP/PT Pulses\par \par Neuro: Intact motor and sensory\par \par Additional Test:\par 1. Calcaneal Squeeze Test: Negative\par 2. Syndesmosis Squeeze Test: Negative [de-identified] : 2 views x-rays lumbar spine reviewed, 6/3/2023: No fracture seen.  Degenerative disc disease noted.  Slight spondylolisthesis L4-L5.\par \par 3 views x-rays left ankle reviewed, 6/3/2023: No fracture seen.

## 2023-06-03 NOTE — HISTORY OF PRESENT ILLNESS
[de-identified] : The patient is a 67-year-old male who presents with low back pain/left ankle pain.  The low back pain is chronic over 1 year.  No trauma to his lower back.  Does have some slight symptoms going down into his buttocks but he denies bowel or bladder incontinence and has no numbness or tingling going all the way down his legs to his feet.  He is walking without assistance, wearing regular sneakers.  He is also having medial sided left ankle pain and swelling.  No trauma associated to the ankle pain.  Denies locking and catching of the ankle.  No other complaints.

## 2023-06-05 ENCOUNTER — APPOINTMENT (OUTPATIENT)
Dept: FAMILY MEDICINE | Facility: CLINIC | Age: 68
End: 2023-06-05

## 2023-06-12 NOTE — HISTORY OF PRESENT ILLNESS
[FreeTextEntry1] : 68 yo man, , father of one son. No prior history of heart disease. C/O chest pain since 1995 that has been evaluated in the past, atypical, constant, pressure like, associated with SOB, especially when moving the left arm. Was treated with NTG for a period (2000) with no response. \par In May 2018 underwent a nuclear stress test (8 METS) and there was no evidence of ischemia and echocardiogram revealed a normal LV function (55%) with mild dilatation of the aortic root. \par Lost to follow up. In 4/30/2021 he underwent PCI to the LAD/Diag with KEV x 4. LV function was normal.\par Still C/O occasional chest pain, stabbing in nature. More when moving the arms or lifting heavy objects. \par Echo done on 6/13/2021 revealed normal LV function. \par Stress test 6/2021 8 METS, normal LV function. No ischemia.  \par Still C/O chest pain, associated with any type of movement of the chest or by simply touching the chest/ribs, or with deep inspiration, radiates to the back. Worse also when he climbs stairs. Denies palpitations, dizziness or ankle swelling. No orthopnea or PND. \par Has DM since 2013 that is treated with oral meds. Last BaX0e=6.5%\par Smoked for 30 years and stopped in 2013 \par Used to drink ETOH in the past. States that stopped in Jan 2021\par Has received the vaccine (Pfizer)\par Denies the use of illicit drugs.\par 
Universal Safety Interventions

## 2023-07-05 ENCOUNTER — APPOINTMENT (OUTPATIENT)
Dept: CARDIOLOGY | Facility: CLINIC | Age: 68
End: 2023-07-05

## 2023-07-10 ENCOUNTER — RX RENEWAL (OUTPATIENT)
Age: 68
End: 2023-07-10

## 2023-07-12 ENCOUNTER — RX RENEWAL (OUTPATIENT)
Age: 68
End: 2023-07-12

## 2023-08-23 ENCOUNTER — NON-APPOINTMENT (OUTPATIENT)
Age: 68
End: 2023-08-23

## 2023-08-24 ENCOUNTER — APPOINTMENT (OUTPATIENT)
Dept: FAMILY MEDICINE | Facility: CLINIC | Age: 68
End: 2023-08-24
Payer: MEDICARE

## 2023-08-24 ENCOUNTER — RESULT CHARGE (OUTPATIENT)
Age: 68
End: 2023-08-24

## 2023-08-24 VITALS
WEIGHT: 190 LBS | TEMPERATURE: 98.3 F | DIASTOLIC BLOOD PRESSURE: 82 MMHG | SYSTOLIC BLOOD PRESSURE: 120 MMHG | HEIGHT: 66 IN | HEART RATE: 81 BPM | RESPIRATION RATE: 14 BRPM | OXYGEN SATURATION: 98 % | BODY MASS INDEX: 30.53 KG/M2

## 2023-08-24 DIAGNOSIS — M25.472 EFFUSION, LEFT ANKLE: ICD-10-CM

## 2023-08-24 DIAGNOSIS — M25.562 PAIN IN RIGHT KNEE: ICD-10-CM

## 2023-08-24 DIAGNOSIS — Z79.899 OTHER LONG TERM (CURRENT) DRUG THERAPY: ICD-10-CM

## 2023-08-24 DIAGNOSIS — E11.42 TYPE 2 DIABETES MELLITUS WITH DIABETIC POLYNEUROPATHY: ICD-10-CM

## 2023-08-24 DIAGNOSIS — M25.561 PAIN IN RIGHT KNEE: ICD-10-CM

## 2023-08-24 DIAGNOSIS — G89.29 PAIN IN RIGHT KNEE: ICD-10-CM

## 2023-08-24 PROCEDURE — 83036 HEMOGLOBIN GLYCOSYLATED A1C: CPT | Mod: QW

## 2023-08-24 PROCEDURE — G0009: CPT

## 2023-08-24 PROCEDURE — 90732 PPSV23 VACC 2 YRS+ SUBQ/IM: CPT

## 2023-08-24 PROCEDURE — 99214 OFFICE O/P EST MOD 30 MIN: CPT | Mod: 25

## 2023-08-24 PROCEDURE — 36415 COLL VENOUS BLD VENIPUNCTURE: CPT

## 2023-08-24 RX ORDER — NAPROXEN 500 MG/1
500 TABLET ORAL
Qty: 60 | Refills: 1 | Status: COMPLETED | COMMUNITY
Start: 2023-05-23 | End: 2023-08-24

## 2023-08-24 RX ORDER — TAMSULOSIN HYDROCHLORIDE 0.4 MG/1
0.4 CAPSULE ORAL
Qty: 30 | Refills: 3 | Status: ACTIVE | COMMUNITY
Start: 2023-03-13 | End: 1900-01-01

## 2023-08-24 RX ORDER — SILDENAFIL 20 MG/1
20 TABLET ORAL
Qty: 6 | Refills: 2 | Status: ACTIVE | COMMUNITY
Start: 2023-03-13 | End: 1900-01-01

## 2023-08-24 NOTE — REVIEW OF SYSTEMS
[Back Pain] : back pain [Negative] : Gastrointestinal [FreeTextEntry9] : Ankle pain and knees instability

## 2023-08-24 NOTE — HISTORY OF PRESENT ILLNESS
[FreeTextEntry1] : diabetes check and med refills [de-identified] : 69 y/o male with T2DM, ED, GERD, HLD, HTN, BPH, CAD, and obesity comes in for medication refills and diabetes check. Pt c/o bilateral knee pain with instability.

## 2023-08-24 NOTE — PHYSICAL EXAM
[No Joint Swelling] : no joint swelling [Grossly Normal Strength/Tone] : grossly normal strength/tone [Normal] : normal gait, coordination grossly intact, no focal deficits and deep tendon reflexes were 2+ and symmetric [de-identified] : bilateral knees examined, normal.

## 2023-08-24 NOTE — ASSESSMENT
[FreeTextEntry1] : 68 y/o male with T2DM, ED, GERD, HLD, HTN, BPH, CAD, and obesity comes in for medication refill, patient complains of lower back pain and needs diabetes check  ENDO: h/o T2DM, ED -A1c  7.1--> 7.3 --> 5.9 --> 6.0 --> 5.6 --> 5.9 POCT today -Continue Januvia 100 mg daily, Metformin 500 mg bid, Pioglitazone 15 mg daily. -Dietary changes and weight loss recommended.  CVS: HTN, HLD, CAD -Cardiology Dr Trevino following -Diet and exercise discussed -Blood pressure well controlled, Continue Metoprolol 50 mg, Amlodipine 5 mg daily, Atorvastatin 80 mg daily, ASA 81 mg daily, Brillinta 90 mg bid  GI: GERD, abdominal pain, small bilateral inguinal hernias -Continue Pantoprazole prn -Following with GI Dr Clifford. -FIOBT negative 7/2021, card provided.  : BPH, ED -Continue Tamsulosin 0.4 mg daily at bedtime, e-refilled -Sildenafil 20 mg tabs, e-refilled.  MSK: Lower back pain, bilateral knee pain -L/S Spine X-rays showed grade 1 anterior listhesis of L4 with respect to L5. Degenerative disc height loss. -Scoliosis Xray showed no scoliosis -Continue Cyclobenzaprine at bedtime, side effects reviewed with pt. -Ortho Dr Devine following -Bilateral x-rays ordered  HCM: -Blood work in house today (CMP, CPK) -HIV / Hep C non-reactive 7/2018 -Covid Vaccinated -Flu vaccine administered 12/22 -Pfizer Bivalent booster administered 12/22 -Prevnar 20 administered in house today

## 2023-08-24 NOTE — HEALTH RISK ASSESSMENT
[Yes] : Yes [Monthly or less (1 pt)] : Monthly or less (1 point) [1 or 2 (0 pts)] : 1 or 2 (0 points) [No] : In the past 12 months have you used drugs other than those required for medical reasons? No [No falls in past year] : Patient reported no falls in the past year [0] : 2) Feeling down, depressed, or hopeless: Not at all (0) [PHQ-2 Negative - No further assessment needed] : PHQ-2 Negative - No further assessment needed [With Patient/Caregiver] : , with patient/caregiver [Aggressive treatment] : aggressive treatment [Never] : Never [Audit-CScore] : 1 [[id number]] : 0 [AdvancecareDate] : 03/23

## 2023-08-29 LAB
ALBUMIN SERPL ELPH-MCNC: 4.3 G/DL
ALP BLD-CCNC: 103 U/L
ALT SERPL-CCNC: 18 U/L
ANION GAP SERPL CALC-SCNC: 10 MMOL/L
AST SERPL-CCNC: 25 U/L
BILIRUB SERPL-MCNC: 0.4 MG/DL
BUN SERPL-MCNC: 11 MG/DL
CALCIUM SERPL-MCNC: 9.6 MG/DL
CHLORIDE SERPL-SCNC: 104 MMOL/L
CK SERPL-CCNC: 303 U/L
CO2 SERPL-SCNC: 26 MMOL/L
CREAT SERPL-MCNC: 0.74 MG/DL
EGFR: 97 ML/MIN/1.73M2
GLUCOSE SERPL-MCNC: 102 MG/DL
POTASSIUM SERPL-SCNC: 4.7 MMOL/L
PROT SERPL-MCNC: 6.9 G/DL
SODIUM SERPL-SCNC: 139 MMOL/L

## 2023-09-09 ENCOUNTER — APPOINTMENT (OUTPATIENT)
Dept: RADIOLOGY | Facility: CLINIC | Age: 68
End: 2023-09-09
Payer: MEDICARE

## 2023-09-09 ENCOUNTER — OUTPATIENT (OUTPATIENT)
Dept: OUTPATIENT SERVICES | Facility: HOSPITAL | Age: 68
LOS: 1 days | End: 2023-09-09
Payer: COMMERCIAL

## 2023-09-09 DIAGNOSIS — Z00.8 ENCOUNTER FOR OTHER GENERAL EXAMINATION: ICD-10-CM

## 2023-09-09 DIAGNOSIS — M25.561 PAIN IN RIGHT KNEE: ICD-10-CM

## 2023-09-09 PROCEDURE — 73564 X-RAY EXAM KNEE 4 OR MORE: CPT

## 2023-09-09 PROCEDURE — 73564 X-RAY EXAM KNEE 4 OR MORE: CPT | Mod: 26,50

## 2023-10-25 LAB — HEMOCCULT STL QL IA: NEGATIVE

## 2023-11-14 ENCOUNTER — RX RENEWAL (OUTPATIENT)
Age: 68
End: 2023-11-14

## 2023-11-20 ENCOUNTER — APPOINTMENT (OUTPATIENT)
Dept: FAMILY MEDICINE | Facility: CLINIC | Age: 68
End: 2023-11-20
Payer: MEDICARE

## 2023-11-20 VITALS
BODY MASS INDEX: 31.5 KG/M2 | DIASTOLIC BLOOD PRESSURE: 84 MMHG | OXYGEN SATURATION: 98 % | HEIGHT: 66 IN | RESPIRATION RATE: 14 BRPM | WEIGHT: 196 LBS | SYSTOLIC BLOOD PRESSURE: 130 MMHG | HEART RATE: 90 BPM

## 2023-11-20 DIAGNOSIS — Z12.5 ENCOUNTER FOR SCREENING FOR MALIGNANT NEOPLASM OF PROSTATE: ICD-10-CM

## 2023-11-20 DIAGNOSIS — M43.16 SPONDYLOLISTHESIS, LUMBAR REGION: ICD-10-CM

## 2023-11-20 DIAGNOSIS — Z00.00 ENCOUNTER FOR GENERAL ADULT MEDICAL EXAMINATION W/OUT ABNORMAL FINDINGS: ICD-10-CM

## 2023-11-20 PROCEDURE — G0439: CPT

## 2023-11-20 PROCEDURE — 83036 HEMOGLOBIN GLYCOSYLATED A1C: CPT | Mod: QW

## 2023-11-20 PROCEDURE — 36415 COLL VENOUS BLD VENIPUNCTURE: CPT

## 2023-11-20 PROCEDURE — 90662 IIV NO PRSV INCREASED AG IM: CPT

## 2023-11-20 PROCEDURE — G0008: CPT

## 2023-11-20 RX ORDER — METOPROLOL TARTRATE 25 MG/1
25 TABLET, FILM COATED ORAL TWICE DAILY
Qty: 270 | Refills: 2 | Status: ACTIVE | COMMUNITY
Start: 1900-01-01 | End: 1900-01-01

## 2023-11-20 RX ORDER — SITAGLIPTIN 100 MG/1
100 TABLET, FILM COATED ORAL DAILY
Qty: 90 | Refills: 3 | Status: ACTIVE | COMMUNITY
Start: 2018-03-23 | End: 1900-01-01

## 2023-11-20 RX ORDER — DICLOFENAC SODIUM 1% 10 MG/G
1 GEL TOPICAL
Qty: 100 | Refills: 3 | Status: ACTIVE | COMMUNITY
Start: 2023-06-03 | End: 1900-01-01

## 2023-11-24 PROBLEM — Z00.00 MEDICARE ANNUAL WELLNESS VISIT, SUBSEQUENT: Status: ACTIVE | Noted: 2023-11-20

## 2023-11-29 LAB — HBA1C MFR BLD HPLC: 5.8

## 2023-12-29 LAB
25(OH)D3 SERPL-MCNC: 44.4 NG/ML
ALBUMIN SERPL ELPH-MCNC: 4.5 G/DL
ALP BLD-CCNC: 111 U/L
ALT SERPL-CCNC: 18 U/L
ANION GAP SERPL CALC-SCNC: 17 MMOL/L
APPEARANCE: CLEAR
AST SERPL-CCNC: 25 U/L
BACTERIA: NEGATIVE /HPF
BILIRUB SERPL-MCNC: 0.5 MG/DL
BILIRUBIN URINE: NEGATIVE
BLOOD URINE: NEGATIVE
BUN SERPL-MCNC: 11 MG/DL
CALCIUM SERPL-MCNC: 9.5 MG/DL
CAST: 0 /LPF
CHLORIDE SERPL-SCNC: 102 MMOL/L
CHOLEST SERPL-MCNC: 112 MG/DL
CO2 SERPL-SCNC: 20 MMOL/L
COLOR: YELLOW
CREAT SERPL-MCNC: 0.72 MG/DL
EGFR: 98 ML/MIN/1.73M2
EPITHELIAL CELLS: 3 /HPF
GLUCOSE QUALITATIVE U: NEGATIVE MG/DL
GLUCOSE SERPL-MCNC: 118 MG/DL
HCT VFR BLD CALC: 39.1 %
HDLC SERPL-MCNC: 56 MG/DL
HGB BLD-MCNC: 12.7 G/DL
KETONES URINE: NEGATIVE MG/DL
LDLC SERPL CALC-MCNC: 43 MG/DL
LEUKOCYTE ESTERASE URINE: NEGATIVE
MCHC RBC-ENTMCNC: 28.5 PG
MCHC RBC-ENTMCNC: 32.5 GM/DL
MCV RBC AUTO: 87.9 FL
MICROSCOPIC-UA: NORMAL
NITRITE URINE: NEGATIVE
NONHDLC SERPL-MCNC: 56 MG/DL
PH URINE: 6
PLATELET # BLD AUTO: 250 K/UL
POTASSIUM SERPL-SCNC: 4.3 MMOL/L
PROT SERPL-MCNC: 7.5 G/DL
PROTEIN URINE: NEGATIVE MG/DL
PSA SERPL-MCNC: 0.16 NG/ML
RBC # BLD: 4.45 M/UL
RBC # FLD: 13.5 %
RED BLOOD CELLS URINE: 0 /HPF
SODIUM SERPL-SCNC: 139 MMOL/L
SPECIFIC GRAVITY URINE: 1.01
TRIGL SERPL-MCNC: 60 MG/DL
TSH SERPL-ACNC: 1.85 UIU/ML
UROBILINOGEN URINE: 0.2 MG/DL
WBC # FLD AUTO: 8.57 K/UL
WHITE BLOOD CELLS URINE: 0 /HPF

## 2024-01-31 ENCOUNTER — TRANSCRIPTION ENCOUNTER (OUTPATIENT)
Age: 69
End: 2024-01-31

## 2024-02-06 DIAGNOSIS — M25.562 PAIN IN LEFT KNEE: ICD-10-CM

## 2024-02-07 ENCOUNTER — APPOINTMENT (OUTPATIENT)
Dept: ORTHOPEDIC SURGERY | Facility: CLINIC | Age: 69
End: 2024-02-07
Payer: MEDICARE

## 2024-02-07 VITALS
WEIGHT: 190 LBS | BODY MASS INDEX: 30.53 KG/M2 | SYSTOLIC BLOOD PRESSURE: 138 MMHG | HEIGHT: 66 IN | HEART RATE: 125 BPM | DIASTOLIC BLOOD PRESSURE: 84 MMHG

## 2024-02-07 DIAGNOSIS — M17.12 UNILATERAL PRIMARY OSTEOARTHRITIS, LEFT KNEE: ICD-10-CM

## 2024-02-07 DIAGNOSIS — M25.552 PAIN IN LEFT HIP: ICD-10-CM

## 2024-02-07 DIAGNOSIS — M54.50 LOW BACK PAIN, UNSPECIFIED: ICD-10-CM

## 2024-02-07 PROCEDURE — 73502 X-RAY EXAM HIP UNI 2-3 VIEWS: CPT | Mod: LT

## 2024-02-07 PROCEDURE — 20610 DRAIN/INJ JOINT/BURSA W/O US: CPT | Mod: LT

## 2024-02-07 PROCEDURE — 73564 X-RAY EXAM KNEE 4 OR MORE: CPT | Mod: LT

## 2024-02-07 PROCEDURE — 99204 OFFICE O/P NEW MOD 45 MIN: CPT | Mod: 25

## 2024-02-07 RX ORDER — DICLOFENAC SODIUM 1% 10 MG/G
1 GEL TOPICAL DAILY
Qty: 1 | Refills: 1 | Status: ACTIVE | COMMUNITY
Start: 2024-02-07 | End: 1900-01-01

## 2024-02-07 NOTE — REVIEW OF SYSTEMS
[Joint Pain] : joint pain [Joint Stiffness] : joint stiffness [Joint Swelling] : joint swelling [Negative] : Heme/Lymph [FreeTextEntry9] : left hip and knee pain

## 2024-02-07 NOTE — ADDENDUM
[FreeTextEntry1] : This note was written by Sindhu Garcia, acting as the  for Dr. Gonzalez. This note accurately reflects the work and decisions made by Dr. Gonzalez.

## 2024-02-07 NOTE — HISTORY OF PRESENT ILLNESS
[Pain Location] : pain [] : left knee [Worsening] : worsening [Standing] : standing [Constant] : ~He/She~ states the symptoms seem to be constant [Sitting] : worsened by sitting [Running] : worsened by running [Walking] : worsened by walking [Knee Flexion] : worsened with knee flexion [Knee Extension] : worsened with knee extension [___ days] : [unfilled] day(s) ago [NSAIDs] : relieved by nonsteroidal anti-inflammatory drugs [de-identified] : 68 year old male presents today for an initial consultation of his left hip and knee pain. The patient states he started running and noticed there was a significant about of pain in his left hip and knee. The patient has a history of back pain. He had an x-ray done of the back a few months ago. History of shoulder pain. He states he has been unable to exercise because of the pain, but he used to be a very active person. There is numbness in the leg if he stands for too long. Clicking, popping and feeling like it will buckle. Takes Motrin as needed for the pain. Denies any recent falls or injuries. He states his back pain started in around 2020 and the hip and knee started to bother him about 1 year ago. The patient is a diabetic. He has a hernia. The patient states the pain worsened 4 days ago (02/03/2024) following his run. [de-identified] : going up and down the stairs, rising from a seated position

## 2024-02-07 NOTE — DISCUSSION/SUMMARY
[Medication Risks Reviewed] : Medication risks reviewed [PRN] : PRN [de-identified] : patient is a 68-year-old male with low back pain lumbar radiculopathy as well as left knee osteoarthritis presenting today for initial evaluation.  I recommended conservative treatment at this time.  I gave him injection of cortisone left knee which he tolerated well.  We discussed low impact activity and exercise.  I recommended physical therapy.  I given a referral over to the physiatry service for his low back pain and lumbar radiculopathy.  I have given a prescription for diclofenac gel.  I will see him back on an as-needed basis for his left knee possible viscosupplementation versus MRI in the future.  All questions were asked and answered

## 2024-02-07 NOTE — PROCEDURE
[Injection] : Injection [Left] : of the left [Knee Joint] : knee joint [Osteoarthritis] : Osteoarthritis [Joint Pain] : Joint pain [Patient] : patient [Verbal Consent Obtained] : verbal consent was obtained prior to the procedure [Alcohol] : Alcohol [Ethyl Chloride Spray] : ethyl chloride spray was used as a topical anesthetic [Lateral] : lateral [22] : a 22-gauge [1% Lidocaine___(mL)] : [unfilled] mL of 1% Lidocaine [Methylpred. 40mg/mL___(mL)] : [unfilled] mL of 40mg/mL methylprednisolone [Bandage Applied] : a bandage [Tolerated Well] : The patient tolerated the procedure well [None] : none [No Strenuous Activity___day(s)] : avoid strenuous activity for [unfilled] day(s) [___ Month(s)] : in [unfilled] month(s) [de-identified] : I injected the left knee.   I discussed at length with the patient the planned steroid and lidocaine injection. The risks, benefits, convalescence and alternatives were reviewed. The possible side effects discussed included but were not limited to: pain, swelling, heat, bleeding, and redness. Symptoms are generally mild but if they are extensive then contact the office. Giving pain relievers by mouth such as NSAIDs or Tylenol can generally treat the reactions to steroid and lidocaine. Rare cases of infection have been noted. Rash, hives and itching may occur post injection. If you have muscle pain or cramps, flushing and or swelling of the face, rapid heart beat, nausea, dizziness, fever, chills, headache, difficulty breathing, swelling in the arms or legs, or have a prickly feeling of your skin, contact a health care provider immediately. Following this discussion, the knee was prepped with Alcohol and under sterile condition the 80 mg Depo-Medrol and 6 cc Lidocaine injection was performed with a 22 gauge needle through a superolateral injection site. The needle was introduced into the joint, aspiration was performed to ensure intra-articular placement and the medication was injected. Upon withdrawal of the needle the site was cleaned with alcohol and a band aid applied. The patient tolerated the injection well and there were no adverse effects. Post injection instructions included no strenuous activity for 24 hours, cryotherapy and if there are any adverse effects to contact the office.

## 2024-02-07 NOTE — PHYSICAL EXAM
[Normal] : Gait: normal [de-identified] : GENERAL APPEARANCE: Well nourished and hydrated, pleasant, alert, and oriented x 3. Appears their stated age.  HEENT: Normocephalic, extraocular eye motion intact. Nasal septum midline. Oral cavity clear. External auditory canal clear.  RESPIRATORY: Breath sounds clear and audible in all lobes. No wheezing, No accessory muscle use. CARDIOVASCULAR: No apparent abnormalities. No lower leg edema. No varicosities. Pedal pulses are palpable. NEUROLOGIC: Sensation is normal, no muscle weakness in the upper or lower extremities. DERMATOLOGIC: No apparent skin lesions, moist, warm, no rash. SPINE: Cervical spine appears normal and moves freely; thoracic spine appears normal and moves freely; lumbosacral spine appears normal and moves freely, normal, nontender. MUSCULOSKELETAL: Hands, wrists, and elbows are normal and move freely, shoulders are normal and move freely.  PSYCHIATRIC: Oriented to person, place, and time, insight and judgement were intact and the affect was normal. [de-identified] : Ambulates normally. Left hip exam showed no groin pain with SLR, ROM is full flexion with 60 degrees external and 30 degrees internal with pain, SHAUNNA negative, FADIR negative. 5/5 motor strength in bilateral lower extremities. Sensory: Intact in bilateral lower extremities. DTRs: Biceps, brachioradialis, triceps, patellar, ankle and plantar 2+ and symmetric bilaterally. Pulses: dorsalis pedis, posterior tibial, femoral, popliteal, and radial 2+ and symmetric bilaterally.  Musculoskeletal: Left knee exam shows no effusion, ROM is 0-125 degrees, no instability, negative Ghassan, medial joint line tenderness. 5/5 motor strength in bilateral lower extremities. Sensory: Intact in bilateral lower extremities. DTRs: Biceps, brachioradialis, triceps, patellar, ankle and plantar 2+ and symmetric bilaterally. Pulses: dorsalis pedis, posterior tibial, femoral, popliteal, and radial 2+ and symmetric bilaterally. [de-identified] : AP pelvis and 2 views of the left hip obtained the office today show no acute fracture or dislocation.  No significant degenerative changes noted.  4 views left knee obtained the office today show no acute fracture or dislocation.  There is mild medial joint space narrowing tricompartmental degenerative changes consistent Kellgren-Charles grade 1 2 changes

## 2024-02-20 ENCOUNTER — APPOINTMENT (OUTPATIENT)
Dept: FAMILY MEDICINE | Facility: CLINIC | Age: 69
End: 2024-02-20
Payer: MEDICARE

## 2024-02-20 VITALS
HEIGHT: 66 IN | TEMPERATURE: 98.2 F | WEIGHT: 199.04 LBS | RESPIRATION RATE: 14 BRPM | OXYGEN SATURATION: 98 % | BODY MASS INDEX: 31.99 KG/M2 | SYSTOLIC BLOOD PRESSURE: 124 MMHG | DIASTOLIC BLOOD PRESSURE: 60 MMHG | HEART RATE: 113 BPM

## 2024-02-20 DIAGNOSIS — N13.8 BENIGN PROSTATIC HYPERPLASIA WITH LOWER URINARY TRACT SYMPMS: ICD-10-CM

## 2024-02-20 DIAGNOSIS — E66.9 OBESITY, UNSPECIFIED: ICD-10-CM

## 2024-02-20 DIAGNOSIS — Z53.20 PROCEDURE AND TREATMENT NOT CARRIED OUT BECAUSE OF PATIENT'S DECISION FOR UNSPECIFIED REASONS: ICD-10-CM

## 2024-02-20 DIAGNOSIS — N40.1 BENIGN PROSTATIC HYPERPLASIA WITH LOWER URINARY TRACT SYMPMS: ICD-10-CM

## 2024-02-20 DIAGNOSIS — E78.5 HYPERLIPIDEMIA, UNSPECIFIED: ICD-10-CM

## 2024-02-20 DIAGNOSIS — I10 ESSENTIAL (PRIMARY) HYPERTENSION: ICD-10-CM

## 2024-02-20 DIAGNOSIS — E11.9 TYPE 2 DIABETES MELLITUS W/OUT COMPLICATIONS: ICD-10-CM

## 2024-02-20 DIAGNOSIS — M54.50 LOW BACK PAIN, UNSPECIFIED: ICD-10-CM

## 2024-02-20 DIAGNOSIS — I25.10 ATHEROSCLEROTIC HEART DISEASE OF NATIVE CORONARY ARTERY W/OUT ANGINA PECTORIS: ICD-10-CM

## 2024-02-20 PROCEDURE — G2211 COMPLEX E/M VISIT ADD ON: CPT

## 2024-02-20 PROCEDURE — 99214 OFFICE O/P EST MOD 30 MIN: CPT

## 2024-02-20 PROCEDURE — 83036 HEMOGLOBIN GLYCOSYLATED A1C: CPT | Mod: QW

## 2024-02-20 RX ORDER — ATORVASTATIN CALCIUM 80 MG/1
80 TABLET, FILM COATED ORAL
Qty: 90 | Refills: 2 | Status: ACTIVE | COMMUNITY
Start: 1900-01-01 | End: 1900-01-01

## 2024-02-20 NOTE — ASSESSMENT
[FreeTextEntry1] : 67 y/o male with T2DM, ED, GERD, HLD, HTN, BPH, CAD, and obesity comes in for wellness visit and medication refill, patient complains of lower back pain and needs diabetes check.  ENDO: h/o T2DM, ED -A1c 7.1--> 7.3 --> 5.9 --> 6.0 --> 5.6 --> 5.9 --> 5.8 --> 6.0 POCT today -Continue Januvia 100 mg daily, Metformin 500 mg bid, Pioglitazone 15 mg daily. -Dietary changes and weight loss recommended.  CVS: HTN, HLD, CAD -Cardiology Dr Trevino following -Diet and exercise discussed -Blood pressure under control, Continue Metoprolol 50 mg 1.5 tab bid, Amlodipine 5 mg daily, Atorvastatin 80 mg daily e- refilled, ASA 81 mg daily, Brillinta 90 mg bid  GI: GERD, abdominal pain, small bilateral inguinal hernias -Continue Pantoprazole prn -Following with GI Dr Clifford. -FIOBT negative 9/2023  : BPH, ED -Continue Tamsulosin 0.4 mg daily at bedtime, e-refilled -Sildenafil 20 mg tabs, e-refilled.  MSK: Lower back pain, bilateral knee pain -L/S Spine X-rays showed grade 1 anterolisthesis of L4 with respect to L5. Degenerative disc height loss. -Scoliosis Xray showed no scoliosis -Continue Cyclobenzaprine at bedtime, side effects reviewed with pt. -Ortho Dr Devine following  HCM: -Fasting Blood work from 12/23 results reviewed with patient -HIV / Hep C non-reactive 7/2018 -Covid Vaccinated -Flu vaccine administered 11/20/23 -Pfizer Bivalent booster administered 12/22 -Prevnar 20 administered 08/23.

## 2024-02-20 NOTE — HISTORY OF PRESENT ILLNESS
[FreeTextEntry1] : diabetes check [de-identified] : 67 y/o male with T2DM, ED, GERD, HLD, HTN, BPH, CAD, and obesity comes in for diabetes check. Pt offers no new complains

## 2024-02-20 NOTE — HEALTH RISK ASSESSMENT
[Yes] : Yes [Monthly or less (1 pt)] : Monthly or less (1 point) [1 or 2 (0 pts)] : 1 or 2 (0 points) [No] : In the past 12 months have you used drugs other than those required for medical reasons? No [No falls in past year] : Patient reported no falls in the past year [0] : 2) Feeling down, depressed, or hopeless: Not at all (0) [PHQ-2 Negative - No further assessment needed] : PHQ-2 Negative - No further assessment needed [With Patient/Caregiver] : , with patient/caregiver [Aggressive treatment] : aggressive treatment [Never] : Never [Audit-CScore] : 1 [de-identified] : None [de-identified] : regular [[id number]] : 0 [AdvancecareDate] : 03/23

## 2024-03-13 ENCOUNTER — APPOINTMENT (OUTPATIENT)
Dept: PHYSICAL MEDICINE AND REHAB | Facility: CLINIC | Age: 69
End: 2024-03-13
Payer: MEDICARE

## 2024-03-13 VITALS
BODY MASS INDEX: 31.66 KG/M2 | RESPIRATION RATE: 14 BRPM | HEART RATE: 96 BPM | WEIGHT: 197 LBS | SYSTOLIC BLOOD PRESSURE: 137 MMHG | HEIGHT: 66 IN | DIASTOLIC BLOOD PRESSURE: 85 MMHG

## 2024-03-13 DIAGNOSIS — Z86.39 PERSONAL HISTORY OF OTHER ENDOCRINE, NUTRITIONAL AND METABOLIC DISEASE: ICD-10-CM

## 2024-03-13 DIAGNOSIS — Z86.79 PERSONAL HISTORY OF OTHER DISEASES OF THE CIRCULATORY SYSTEM: ICD-10-CM

## 2024-03-13 DIAGNOSIS — M47.812 SPONDYLOSIS W/OUT MYELOPATHY OR RADICULOPATHY, CERVICAL REGION: ICD-10-CM

## 2024-03-13 DIAGNOSIS — M47.816 SPONDYLOSIS W/OUT MYELOPATHY OR RADICULOPATHY, LUMBAR REGION: ICD-10-CM

## 2024-03-13 PROCEDURE — 99204 OFFICE O/P NEW MOD 45 MIN: CPT

## 2024-03-13 NOTE — DATA REVIEWED
[FreeTextEntry1] : X-ray L Spine 6/3/23 reviewed and interpreted by me: mild spondylolisthesis L4-5 seen  CT Abd/Pelvis 12/6/22 reviewed and interpreted by me: degenerative changes of L Spine seen  EXAM: [id number] - CT ABDOMEN AND PELVIS IC  - ORDERED BY: BOB CHAVARRIA   PROCEDURE DATE:  12/03/2022    INTERPRETATION:  CLINICAL INFORMATION: Weight loss. Diarrhea.  COMPARISON: Contrast-enhanced CT of the abdomen and pelvis October 6, 2018.  CONTRAST/COMPLICATIONS: IV Contrast: Omnipaque 350  90 cc administered   10 cc discarded Oral Contrast: Omnipaque 300 Complications: None reported at time of study completion  PROCEDURE: CT of the Abdomen and Pelvis was performed. Arterial and Portal Venous phases were acquired. Sagittal and coronal reformats were performed.  FINDINGS: LOWER CHEST: Within normal limits.  LIVER: Within normal limits. The hepatic veins and portal vein are patent. BILE DUCTS: Normal caliber. GALLBLADDER: Within normal limits. SPLEEN: Within normal limits. PANCREAS: The pancreas is unremarkable in appearance and enhancement. There is no peripancreatic stranding or fluid collection. The pancreatic duct is not dilated. ADRENALS: Within normal limits. KIDNEYS/URETERS: Within normal limits. Retroaortic left renal vein.  BLADDER: Minimally distended. REPRODUCTIVE ORGANS: The prostate is not enlarged.  BOWEL: No bowel obstruction. Appendix is normal. There is no mesenteric adenopathy. The mesenteric vessels opacify unremarkably. Replaced common hepatic artery originating from the SMA. PERITONEUM: No ascites. VESSELS: Within normal limits. RETROPERITONEUM/LYMPH NODES: No lymphadenopathy. ABDOMINAL WALL: Small bilateral fat-containing inguinal hernias. BONES: Within normal limits.  IMPRESSION: Unremarkable CT appearance of the pancreas.  --- End of Report ---       ROMI CHINO MD; Attending Radiologist This document has been electronically signed. Dec  4 2022 11:05PM

## 2024-03-13 NOTE — PHYSICAL EXAM
[FreeTextEntry1] : PE: Constitutional: In NAD, calm and cooperative MSK (Back)  Inspection: no gross swelling identified  Palpation: Tenderness of the bilateral lower lumbar paraspinals  ROM: Pain at end lumbar extension/flexion  Strength: 5/5 strength in bilateral lower extremities  Reflexes: 2+ Patella reflex bilaterally, 2+ Achilles reflex bilaterally, negative clonus bilaterally  Sensation: Intact to light touch in bilateral lower extremities Special tests: SLR: negative bilaterally SHAUNNA: negative bilaterally FADIR: negative bilaterally Facet loading: positive bilaterally

## 2024-03-13 NOTE — HISTORY OF PRESENT ILLNESS
[FreeTextEntry1] : Mr. ADÁN SHABAZZ is a 68 year old male with a PMHx of DM2, GERD< HLD, CAD, obesity who presents with low back/neck pain.   Location: Across low back/posterior neck Onset: Around 2022, no inciting events Provocation/Palliative: Worse with bending/activity, better with rest Quality:Achy Radiation: Can travel down bilateral LE, no significant UE radiation Severity: Moderate Timing: Not improving with time  Admits to some tingling down bilateral legs, no numbness in arms. Denies any associated leg/arm weakness. Denies any loss of bowel/bladder control or any groin numbness. Previous medications trialed: Cyclobenzaprine with some relief, Tylenol with some relief Previous procedures relevant to complaint: None Conservative therapy tried?: No recent PT

## 2024-03-13 NOTE — DATA REVIEWED
[FreeTextEntry1] : X-ray L Spine 6/3/23 reviewed and interpreted by me: mild spondylolisthesis L4-5 seen  CT Abd/Pelvis 12/6/22 reviewed and interpreted by me: degenerative changes of L Spine seen  EXAM: [id number] - CT ABDOMEN AND PELVIS IC  - ORDERED BY: BOB CHAVARRIA   PROCEDURE DATE:  12/03/2022    INTERPRETATION:  CLINICAL INFORMATION: Weight loss. Diarrhea.  COMPARISON: Contrast-enhanced CT of the abdomen and pelvis October 6, 2018.  CONTRAST/COMPLICATIONS: IV Contrast: Omnipaque 350  90 cc administered   10 cc discarded Oral Contrast: Omnipaque 300 Complications: None reported at time of study completion  PROCEDURE: CT of the Abdomen and Pelvis was performed. Arterial and Portal Venous phases were acquired. Sagittal and coronal reformats were performed.  FINDINGS: LOWER CHEST: Within normal limits.  LIVER: Within normal limits. The hepatic veins and portal vein are patent. BILE DUCTS: Normal caliber. GALLBLADDER: Within normal limits. SPLEEN: Within normal limits. PANCREAS: The pancreas is unremarkable in appearance and enhancement. There is no peripancreatic stranding or fluid collection. The pancreatic duct is not dilated. ADRENALS: Within normal limits. KIDNEYS/URETERS: Within normal limits. Retroaortic left renal vein.  BLADDER: Minimally distended. REPRODUCTIVE ORGANS: The prostate is not enlarged.  BOWEL: No bowel obstruction. Appendix is normal. There is no mesenteric adenopathy. The mesenteric vessels opacify unremarkably. Replaced common hepatic artery originating from the SMA. PERITONEUM: No ascites. VESSELS: Within normal limits. RETROPERITONEUM/LYMPH NODES: No lymphadenopathy. ABDOMINAL WALL: Small bilateral fat-containing inguinal hernias. BONES: Within normal limits.  IMPRESSION: Unremarkable CT appearance of the pancreas.  --- End of Report ---       ROMI CHINO MD; Attending Radiologist This document has been electronically signed. Dec  4 2022 11:05PM

## 2024-03-13 NOTE — ASSESSMENT
[FreeTextEntry1] : Mr. ADÁN SHABAZZ is a 68 year old male who presents with persistent low back pain and neck pain, likely due to underlying spondylosis, possible radiculopathy. Denies any red flag signs. Will recommend: - X-ray L Spine reviewed - Start PT 2-3x/week for stretching, strengthening (especially of core muscles), ROM exercises, HEP and modalities PRN including myofascial release, moist heat  - Continue occasional cyclobenzaprine, aware of R/B/A of medication - Can consider advanced imaging of neck/low back at next visit if pain continues  RC in 4-6 weeks. Patient aware of red flag signs including any changes to their bowel/bladder control, groin numbness or new weakness. Patient knows to seek immediate attention by calling 911 or going to nearest ER if these symptoms appear.

## 2024-03-20 ENCOUNTER — RX RENEWAL (OUTPATIENT)
Age: 69
End: 2024-03-20

## 2024-03-21 RX ORDER — AMLODIPINE BESYLATE 5 MG/1
5 TABLET ORAL DAILY
Qty: 90 | Refills: 0 | Status: ACTIVE | COMMUNITY
Start: 2022-11-28 | End: 1900-01-01

## 2024-04-10 ENCOUNTER — APPOINTMENT (OUTPATIENT)
Dept: PHYSICAL MEDICINE AND REHAB | Facility: CLINIC | Age: 69
End: 2024-04-10

## 2024-04-12 NOTE — PHYSICAL EXAM
[FreeTextEntry1] : PE: Constitutional: In NAD, calm and cooperative MSK (Back)  Inspection: no gross swelling identified  Palpation: Tenderness of the bilateral lower lumbar paraspinals  ROM: Pain at end lumbar extension/flexion  Strength: 5/5 strength in bilateral lower extremities  Reflexes: 2+ Patella reflex bilaterally, 2+ Achilles reflex bilaterally, negative clonus bilaterally  Sensation: Intact to light touch in bilateral lower extremities Special tests: SLR: negative bilaterally SHAUNNA: negative bilaterally FADIR: negative bilaterally Facet loading: positive bilaterally.

## 2024-04-12 NOTE — HISTORY OF PRESENT ILLNESS
[FreeTextEntry1] : Mr. ADÁN SHABAZZ is a 68 year old male who presents for follow up. At last visit, he was started on PT, occasional cyclobenzaprine and spoke about advanced imaging if pain continued.     Location: Across low back/posterior neck Onset: Around 2022, no inciting events Provocation/Palliative: Worse with bending/activity, better with rest Quality:Achy Radiation: Can travel down bilateral LE, no significant UE radiation Severity: Moderate Timing: Not improving with time  No bowel/bladder changes. No groin numbness.

## 2024-04-26 ENCOUNTER — RX RENEWAL (OUTPATIENT)
Age: 69
End: 2024-04-26

## 2024-04-29 RX ORDER — METFORMIN HYDROCHLORIDE 1000 MG/1
1000 TABLET, COATED ORAL
Qty: 180 | Refills: 0 | Status: ACTIVE | COMMUNITY
Start: 2024-04-26 | End: 1900-01-01

## 2024-04-30 ENCOUNTER — APPOINTMENT (OUTPATIENT)
Dept: FAMILY MEDICINE | Facility: CLINIC | Age: 69
End: 2024-04-30

## 2024-05-16 NOTE — COUNSELING
[Use proper foot wear] : Use proper foot wear [Use recommended devices] : Use recommended devices [Engage in a relaxing activity] : Engage in a relaxing activity [AUDIT-C Screening administered and reviewed] : AUDIT-C Screening administered and reviewed [Encouraged to increase physical activity] : Encouraged to increase physical activity [None] : None [Good understanding] : Patient has a good understanding of lifestyle changes and steps needed to achieve self management goal [FreeTextEntry2] : Drinks alcohol occasionally no

## 2024-05-17 NOTE — ED ADULT NURSE NOTE - ALCOHOL PRE SCREEN (AUDIT - C)
Addended by: APOLINAR CEDENO on: 5/17/2024 04:52 PM     Modules accepted: Orders     Statement Selected

## 2024-06-14 ENCOUNTER — APPOINTMENT (OUTPATIENT)
Dept: CARDIOLOGY | Facility: CLINIC | Age: 69
End: 2024-06-14

## 2024-07-01 ENCOUNTER — RX RENEWAL (OUTPATIENT)
Age: 69
End: 2024-07-01

## 2024-08-06 ENCOUNTER — APPOINTMENT (OUTPATIENT)
Dept: FAMILY MEDICINE | Facility: CLINIC | Age: 69
End: 2024-08-06

## 2024-08-06 PROCEDURE — G2211 COMPLEX E/M VISIT ADD ON: CPT

## 2024-08-06 PROCEDURE — 99214 OFFICE O/P EST MOD 30 MIN: CPT

## 2024-08-06 PROCEDURE — 36415 COLL VENOUS BLD VENIPUNCTURE: CPT

## 2024-08-06 PROCEDURE — 83036 HEMOGLOBIN GLYCOSYLATED A1C: CPT | Mod: QW

## 2024-08-12 NOTE — HISTORY OF PRESENT ILLNESS
[FreeTextEntry1] : diabetes check [de-identified] : 69 y/o male with T2DM, ED, GERD, HLD, HTN, BPH, CAD, and obesity comes in for diabetes check. Pt c/o abdominal bloating and pain which has no relation to food intake but has noticed that taking Fauzia [place] relieves it.

## 2024-08-12 NOTE — HISTORY OF PRESENT ILLNESS
[FreeTextEntry1] : diabetes check [de-identified] : 69 y/o male with T2DM, ED, GERD, HLD, HTN, BPH, CAD, and obesity comes in for diabetes check. Pt c/o abdominal bloating and pain which has no relation to food intake but has noticed that taking Fauzia [place] relieves it.

## 2024-08-12 NOTE — HEALTH RISK ASSESSMENT
[Yes] : Yes [Monthly or less (1 pt)] : Monthly or less (1 point) [1 or 2 (0 pts)] : 1 or 2 (0 points) [No] : In the past 12 months have you used drugs other than those required for medical reasons? No [No falls in past year] : Patient reported no falls in the past year [0] : 2) Feeling down, depressed, or hopeless: Not at all (0) [PHQ-2 Negative - No further assessment needed] : PHQ-2 Negative - No further assessment needed [With Patient/Caregiver] : , with patient/caregiver [Aggressive treatment] : aggressive treatment [Never] : Never [Audit-CScore] : 1 [de-identified] : None [de-identified] : regular [[id number]] : 0 [AdvancecareDate] : 03/23

## 2024-08-12 NOTE — HISTORY OF PRESENT ILLNESS
[FreeTextEntry1] : diabetes check [de-identified] : 69 y/o male with T2DM, ED, GERD, HLD, HTN, BPH, CAD, and obesity comes in for diabetes check. Pt c/o abdominal bloating and pain which has no relation to food intake but has noticed that taking Fauzia [place] relieves it.

## 2024-08-12 NOTE — ASSESSMENT
[FreeTextEntry1] : 69 y/o male with T2DM, ED, GERD, HLD, HTN, BPH, CAD, and obesity comes in for wellness visit and medication refill, patient complains of lower back pain and needs diabetes check.  ENDO: h/o T2DM, ED -A1c 7.1--> 7.3 --> 5.9 --> 6.0 --> 5.6 --> 5.9 --> 5.8 --> 6.0 --> 6.4 POCT today -Continue Januvia 100 mg daily, Metformin 500 mg bid, discontinue Pioglitazone 15 mg daily. -Dietary changes and weight loss recommended.  CVS: HTN, HLD, CAD -Cardiology Dr Trevino following -Diet and exercise discussed -Blood pressure under control, Continue Metoprolol 50 mg 1.5 tab bid, Amlodipine 5 mg daily, Atorvastatin 80 mg daily e- refilled, ASA 81 mg daily, Brillinta 90 mg bid  GI: GERD, abdominal pain, small bilateral inguinal hernias -Continue Pantoprazole prn, e-refilled -Following with GI Dr Clifford. -FIOBT negative 9/2023  : BPH, ED -Continue Tamsulosin 0.4 mg daily at bedtime, e-refilled -Sildenafil 20 mg tabs, e-refilled.  MSK: Lower back pain, bilateral knee pain -L/S Spine X-rays showed grade 1 anterolisthesis of L4 with respect to L5. Degenerative disc height loss. -Scoliosis Xray showed no scoliosis -Continue Cyclobenzaprine at bedtime, side effects reviewed with pt, e-refilled -Ortho Dr Devine following  HCM: -Blood work in house today -HIV / Hep C non-reactive 7/2018 -Covid Vaccinated -Flu vaccine administered 11/20/23 -Pfizer Bivalent booster administered 12/22 -Prevnar 20 administered 08/23.

## 2024-08-12 NOTE — ASSESSMENT
[FreeTextEntry1] : 67 y/o male with T2DM, ED, GERD, HLD, HTN, BPH, CAD, and obesity comes in for wellness visit and medication refill, patient complains of lower back pain and needs diabetes check.  ENDO: h/o T2DM, ED -A1c 7.1--> 7.3 --> 5.9 --> 6.0 --> 5.6 --> 5.9 --> 5.8 --> 6.0 --> 6.4 POCT today -Continue Januvia 100 mg daily, Metformin 500 mg bid, discontinue Pioglitazone 15 mg daily. -Dietary changes and weight loss recommended.  CVS: HTN, HLD, CAD -Cardiology Dr Trevino following -Diet and exercise discussed -Blood pressure under control, Continue Metoprolol 50 mg 1.5 tab bid, Amlodipine 5 mg daily, Atorvastatin 80 mg daily e- refilled, ASA 81 mg daily, Brillinta 90 mg bid  GI: GERD, abdominal pain, small bilateral inguinal hernias -Continue Pantoprazole prn, e-refilled -Following with GI Dr Clifford. -FIOBT negative 9/2023  : BPH, ED -Continue Tamsulosin 0.4 mg daily at bedtime, e-refilled -Sildenafil 20 mg tabs, e-refilled.  MSK: Lower back pain, bilateral knee pain -L/S Spine X-rays showed grade 1 anterolisthesis of L4 with respect to L5. Degenerative disc height loss. -Scoliosis Xray showed no scoliosis -Continue Cyclobenzaprine at bedtime, side effects reviewed with pt, e-refilled -Ortho Dr Devine following  HCM: -Blood work in house today -HIV / Hep C non-reactive 7/2018 -Covid Vaccinated -Flu vaccine administered 11/20/23 -Pfizer Bivalent booster administered 12/22 -Prevnar 20 administered 08/23.

## 2024-08-12 NOTE — HEALTH RISK ASSESSMENT
[Yes] : Yes [Monthly or less (1 pt)] : Monthly or less (1 point) [1 or 2 (0 pts)] : 1 or 2 (0 points) [No] : In the past 12 months have you used drugs other than those required for medical reasons? No [No falls in past year] : Patient reported no falls in the past year [0] : 2) Feeling down, depressed, or hopeless: Not at all (0) [PHQ-2 Negative - No further assessment needed] : PHQ-2 Negative - No further assessment needed [With Patient/Caregiver] : , with patient/caregiver [Aggressive treatment] : aggressive treatment [Never] : Never [Audit-CScore] : 1 [de-identified] : None [de-identified] : regular [[id number]] : 0 [AdvancecareDate] : 03/23

## 2024-08-13 ENCOUNTER — RX RENEWAL (OUTPATIENT)
Age: 69
End: 2024-08-13

## 2024-08-15 ENCOUNTER — EMERGENCY (EMERGENCY)
Facility: HOSPITAL | Age: 69
LOS: 1 days | Discharge: HOME | End: 2024-08-15
Attending: EMERGENCY MEDICINE
Payer: COMMERCIAL

## 2024-08-15 VITALS
TEMPERATURE: 98 F | DIASTOLIC BLOOD PRESSURE: 82 MMHG | OXYGEN SATURATION: 96 % | SYSTOLIC BLOOD PRESSURE: 123 MMHG | RESPIRATION RATE: 18 BRPM | HEART RATE: 95 BPM | WEIGHT: 199.96 LBS | HEIGHT: 66 IN

## 2024-08-15 VITALS
DIASTOLIC BLOOD PRESSURE: 75 MMHG | SYSTOLIC BLOOD PRESSURE: 131 MMHG | HEART RATE: 100 BPM | RESPIRATION RATE: 20 BRPM | TEMPERATURE: 98 F | OXYGEN SATURATION: 98 %

## 2024-08-15 LAB
ALBUMIN SERPL ELPH-MCNC: 4.2 G/DL — SIGNIFICANT CHANGE UP (ref 3.3–5.2)
ALP SERPL-CCNC: 120 U/L — SIGNIFICANT CHANGE UP (ref 40–120)
ALT FLD-CCNC: 21 U/L — SIGNIFICANT CHANGE UP
ANION GAP SERPL CALC-SCNC: 11 MMOL/L — SIGNIFICANT CHANGE UP (ref 5–17)
AST SERPL-CCNC: 25 U/L — SIGNIFICANT CHANGE UP
BASOPHILS # BLD AUTO: 0.04 K/UL — SIGNIFICANT CHANGE UP (ref 0–0.2)
BASOPHILS NFR BLD AUTO: 0.4 % — SIGNIFICANT CHANGE UP (ref 0–2)
BILIRUB SERPL-MCNC: 0.5 MG/DL — SIGNIFICANT CHANGE UP (ref 0.4–2)
BUN SERPL-MCNC: 9.3 MG/DL — SIGNIFICANT CHANGE UP (ref 8–20)
CALCIUM SERPL-MCNC: 9.5 MG/DL — SIGNIFICANT CHANGE UP (ref 8.4–10.5)
CHLORIDE SERPL-SCNC: 100 MMOL/L — SIGNIFICANT CHANGE UP (ref 96–108)
CO2 SERPL-SCNC: 25 MMOL/L — SIGNIFICANT CHANGE UP (ref 22–29)
CREAT SERPL-MCNC: 0.72 MG/DL — SIGNIFICANT CHANGE UP (ref 0.5–1.3)
EGFR: 98 ML/MIN/1.73M2 — SIGNIFICANT CHANGE UP
EOSINOPHIL # BLD AUTO: 0.13 K/UL — SIGNIFICANT CHANGE UP (ref 0–0.5)
EOSINOPHIL NFR BLD AUTO: 1.4 % — SIGNIFICANT CHANGE UP (ref 0–6)
GLUCOSE SERPL-MCNC: 88 MG/DL — SIGNIFICANT CHANGE UP (ref 70–99)
HCT VFR BLD CALC: 39.9 % — SIGNIFICANT CHANGE UP (ref 39–50)
HGB BLD-MCNC: 13.1 G/DL — SIGNIFICANT CHANGE UP (ref 13–17)
IMM GRANULOCYTES NFR BLD AUTO: 0.6 % — SIGNIFICANT CHANGE UP (ref 0–0.9)
LYMPHOCYTES # BLD AUTO: 1.4 K/UL — SIGNIFICANT CHANGE UP (ref 1–3.3)
LYMPHOCYTES # BLD AUTO: 15.6 % — SIGNIFICANT CHANGE UP (ref 13–44)
MCHC RBC-ENTMCNC: 27.2 PG — SIGNIFICANT CHANGE UP (ref 27–34)
MCHC RBC-ENTMCNC: 32.8 GM/DL — SIGNIFICANT CHANGE UP (ref 32–36)
MCV RBC AUTO: 83 FL — SIGNIFICANT CHANGE UP (ref 80–100)
MONOCYTES # BLD AUTO: 0.75 K/UL — SIGNIFICANT CHANGE UP (ref 0–0.9)
MONOCYTES NFR BLD AUTO: 8.3 % — SIGNIFICANT CHANGE UP (ref 2–14)
NEUTROPHILS # BLD AUTO: 6.63 K/UL — SIGNIFICANT CHANGE UP (ref 1.8–7.4)
NEUTROPHILS NFR BLD AUTO: 73.7 % — SIGNIFICANT CHANGE UP (ref 43–77)
PLATELET # BLD AUTO: 273 K/UL — SIGNIFICANT CHANGE UP (ref 150–400)
POTASSIUM SERPL-MCNC: 4.4 MMOL/L — SIGNIFICANT CHANGE UP (ref 3.5–5.3)
POTASSIUM SERPL-SCNC: 4.4 MMOL/L — SIGNIFICANT CHANGE UP (ref 3.5–5.3)
PROT SERPL-MCNC: 7.9 G/DL — SIGNIFICANT CHANGE UP (ref 6.6–8.7)
RBC # BLD: 4.81 M/UL — SIGNIFICANT CHANGE UP (ref 4.2–5.8)
RBC # FLD: 13.1 % — SIGNIFICANT CHANGE UP (ref 10.3–14.5)
SODIUM SERPL-SCNC: 136 MMOL/L — SIGNIFICANT CHANGE UP (ref 135–145)
TROPONIN T, HIGH SENSITIVITY RESULT: 14 NG/L — SIGNIFICANT CHANGE UP (ref 0–51)
TROPONIN T, HIGH SENSITIVITY RESULT: 16 NG/L — SIGNIFICANT CHANGE UP (ref 0–51)
WBC # BLD: 9 K/UL — SIGNIFICANT CHANGE UP (ref 3.8–10.5)
WBC # FLD AUTO: 9 K/UL — SIGNIFICANT CHANGE UP (ref 3.8–10.5)

## 2024-08-15 PROCEDURE — 96374 THER/PROPH/DIAG INJ IV PUSH: CPT

## 2024-08-15 PROCEDURE — 93005 ELECTROCARDIOGRAM TRACING: CPT

## 2024-08-15 PROCEDURE — 71045 X-RAY EXAM CHEST 1 VIEW: CPT

## 2024-08-15 PROCEDURE — 99285 EMERGENCY DEPT VISIT HI MDM: CPT

## 2024-08-15 PROCEDURE — 85025 COMPLETE CBC W/AUTO DIFF WBC: CPT

## 2024-08-15 PROCEDURE — 84484 ASSAY OF TROPONIN QUANT: CPT

## 2024-08-15 PROCEDURE — 36415 COLL VENOUS BLD VENIPUNCTURE: CPT

## 2024-08-15 PROCEDURE — 99285 EMERGENCY DEPT VISIT HI MDM: CPT | Mod: 25

## 2024-08-15 PROCEDURE — 93010 ELECTROCARDIOGRAM REPORT: CPT

## 2024-08-15 PROCEDURE — 80053 COMPREHEN METABOLIC PANEL: CPT

## 2024-08-15 PROCEDURE — 96375 TX/PRO/DX INJ NEW DRUG ADDON: CPT

## 2024-08-15 PROCEDURE — 71045 X-RAY EXAM CHEST 1 VIEW: CPT | Mod: 26

## 2024-08-15 RX ORDER — FAMOTIDINE 40 MG/1
20 TABLET, FILM COATED ORAL ONCE
Refills: 0 | Status: COMPLETED | OUTPATIENT
Start: 2024-08-15 | End: 2024-08-15

## 2024-08-15 RX ORDER — FAMOTIDINE 40 MG/1
1 TABLET, FILM COATED ORAL
Qty: 30 | Refills: 0
Start: 2024-08-15 | End: 2024-09-13

## 2024-08-15 RX ORDER — KETOROLAC TROMETHAMINE 10 MG
15 TABLET ORAL ONCE
Refills: 0 | Status: DISCONTINUED | OUTPATIENT
Start: 2024-08-15 | End: 2024-08-15

## 2024-08-15 RX ADMIN — Medication 15 MILLIGRAM(S): at 16:23

## 2024-08-15 RX ADMIN — FAMOTIDINE 20 MILLIGRAM(S): 40 TABLET, FILM COATED ORAL at 10:56

## 2024-08-15 NOTE — ED ADULT NURSE REASSESSMENT NOTE - NS ED NURSE REASSESS COMMENT FT1
Pt resting in bed, respirations are equal and unlabored on room air. Pt connected to  and cardiac monitor, no signs of acute distress noted at this time. Pt left in position of comfort, wheels of stretcher locked and in the lowest position.
Pt A+Ox4, respirations are equal and unlabored on room air. Pt discharged at this time, IV removed, vital signs recorded, discharge paperwork provided to pt.

## 2024-08-15 NOTE — ED PROVIDER NOTE - PHYSICAL EXAMINATION
Gen: Well appearing in NAD  Head: NC/AT  Neck: trachea midline  Card: regular rate and rhythm, reproducible chest pain  Resp:  CTAB  Abd: soft, non-distended, non-tender  Ext: no deformities above reported baseline  Neuro:  A&O, no motor or sensory deficits above reported baseline  Skin:  Warm and dry as visualized  Psych:  Normal affect and mood

## 2024-08-15 NOTE — ED PROVIDER NOTE - CLINICAL SUMMARY MEDICAL DECISION MAKING FREE TEXT BOX
69 year old male presents with chest pain. CXR, cardiac labs, ekg ordered. 69 year old male presents with chest pain. CXR, cardiac labs, ekg ordered.    All labs within normal limits, patient feeling better. 69 year old male presents with chest pain. CXR, cardiac labs, ekg ordered.    All labs within normal limits, patient feeling better. DC with pepcid prescription

## 2024-08-15 NOTE — ED ADULT NURSE NOTE - OBJECTIVE STATEMENT
Pt A+Ox4 c/o CP and SOB. Pt states that he has been experiencing this over the past few months, but states last night he became extremely SOB last night and could not sleep. Respirations are equal and unlabored on room air, pt speaking complete coherent sentences. Pt states the pain is mild, and describes it as a discomfort. Pt denies dizziness, HA, N/V/D. Pt left in position of comfort, wheels of stretcher locked and in the lowest position. Pt connected to  and cardiac monitor. Call bell within reach.

## 2024-08-15 NOTE — ED PROVIDER NOTE - ATTENDING CONTRIBUTION TO CARE
Nonspecific chest pain without any other anginal equivalent symptoms, complains of "cracking" in the rib cage and has some soreness on palpation. ECG nonischemic appearing and troponin negative x 2. Suspect this may be costochondritis, but did also have some relief with acid suppression. Has cardiologist to follow up with, return precautions provided.

## 2024-08-15 NOTE — ED PROVIDER NOTE - OBJECTIVE STATEMENT
69 year old male with pmhx of CAD s/p 4 stents (2021), DM presents with chest pain that start on Monday. He states the pain is in the center of his chest and it has been intermittent. Reports associated shortness of breath. He is also complaining of bilateral rib pain that started about 1 week ago. Patient states that since July he has had episodes of epigastric pain which he described as gas pains that was not relieved with Mylanta. He stopped taking all of his prescribed medications 1 month ago because he thought they were contributing to his pain. He had an appointment with his PCP on 8/6 and restarted his home medications after that appointment and has been taking them since. PCP prescribed him robaxin for his rib pain but he has not taken it. He denies fever, lightheadedness, weakness, vision changes, headache, or any other complaints at this time.

## 2024-08-15 NOTE — ED PROVIDER NOTE - PATIENT PORTAL LINK FT
You can access the FollowMyHealth Patient Portal offered by [facility] by registering at the following website: [email or web address]. By joining [employer]’s FollowMyHealth portal, you will also be able to view your health information using other applications (apps) compatible with our system.

## 2024-08-19 ENCOUNTER — APPOINTMENT (OUTPATIENT)
Dept: FAMILY MEDICINE | Facility: CLINIC | Age: 69
End: 2024-08-19

## 2024-08-20 ENCOUNTER — APPOINTMENT (OUTPATIENT)
Dept: CARDIOLOGY | Facility: CLINIC | Age: 69
End: 2024-08-20
Payer: MEDICARE

## 2024-08-20 VITALS
WEIGHT: 198 LBS | HEIGHT: 66 IN | DIASTOLIC BLOOD PRESSURE: 68 MMHG | OXYGEN SATURATION: 96 % | SYSTOLIC BLOOD PRESSURE: 122 MMHG | HEART RATE: 92 BPM | BODY MASS INDEX: 31.82 KG/M2

## 2024-08-20 DIAGNOSIS — R06.02 SHORTNESS OF BREATH: ICD-10-CM

## 2024-08-20 DIAGNOSIS — R53.83 OTHER FATIGUE: ICD-10-CM

## 2024-08-20 DIAGNOSIS — R07.89 OTHER CHEST PAIN: ICD-10-CM

## 2024-08-20 PROCEDURE — 99214 OFFICE O/P EST MOD 30 MIN: CPT

## 2024-08-20 PROCEDURE — 93000 ELECTROCARDIOGRAM COMPLETE: CPT

## 2024-09-09 ENCOUNTER — RX RENEWAL (OUTPATIENT)
Age: 69
End: 2024-09-09

## 2024-09-12 ENCOUNTER — NON-APPOINTMENT (OUTPATIENT)
Age: 69
End: 2024-09-12

## 2024-09-12 ENCOUNTER — APPOINTMENT (OUTPATIENT)
Dept: CARDIOLOGY | Facility: CLINIC | Age: 69
End: 2024-09-12
Payer: MEDICARE

## 2024-09-12 PROCEDURE — 93306 TTE W/DOPPLER COMPLETE: CPT

## 2024-09-17 ENCOUNTER — APPOINTMENT (OUTPATIENT)
Dept: CARDIOLOGY | Facility: CLINIC | Age: 69
End: 2024-09-17

## 2024-10-23 ENCOUNTER — APPOINTMENT (OUTPATIENT)
Dept: CARDIOLOGY | Facility: CLINIC | Age: 69
End: 2024-10-23
Payer: MEDICARE

## 2024-10-23 ENCOUNTER — NON-APPOINTMENT (OUTPATIENT)
Age: 69
End: 2024-10-23

## 2024-10-23 VITALS
BODY MASS INDEX: 31.34 KG/M2 | WEIGHT: 195 LBS | OXYGEN SATURATION: 98 % | DIASTOLIC BLOOD PRESSURE: 77 MMHG | HEIGHT: 66 IN | SYSTOLIC BLOOD PRESSURE: 130 MMHG | HEART RATE: 99 BPM

## 2024-10-23 DIAGNOSIS — R07.89 OTHER CHEST PAIN: ICD-10-CM

## 2024-10-23 PROCEDURE — 93000 ELECTROCARDIOGRAM COMPLETE: CPT

## 2024-10-23 PROCEDURE — 99214 OFFICE O/P EST MOD 30 MIN: CPT

## 2024-10-31 ENCOUNTER — APPOINTMENT (OUTPATIENT)
Dept: FAMILY MEDICINE | Facility: CLINIC | Age: 69
End: 2024-10-31
Payer: MEDICARE

## 2024-10-31 VITALS
BODY MASS INDEX: 31.34 KG/M2 | RESPIRATION RATE: 16 BRPM | WEIGHT: 195 LBS | HEIGHT: 66 IN | HEART RATE: 100 BPM | SYSTOLIC BLOOD PRESSURE: 116 MMHG | DIASTOLIC BLOOD PRESSURE: 70 MMHG | TEMPERATURE: 97.8 F | OXYGEN SATURATION: 98 %

## 2024-10-31 DIAGNOSIS — M54.50 LOW BACK PAIN, UNSPECIFIED: ICD-10-CM

## 2024-10-31 DIAGNOSIS — M25.50 PAIN IN UNSPECIFIED JOINT: ICD-10-CM

## 2024-10-31 DIAGNOSIS — I25.10 ATHEROSCLEROTIC HEART DISEASE OF NATIVE CORONARY ARTERY W/OUT ANGINA PECTORIS: ICD-10-CM

## 2024-10-31 DIAGNOSIS — K21.9 GASTRO-ESOPHAGEAL REFLUX DISEASE W/OUT ESOPHAGITIS: ICD-10-CM

## 2024-10-31 DIAGNOSIS — R53.81 OTHER MALAISE: ICD-10-CM

## 2024-10-31 DIAGNOSIS — I10 ESSENTIAL (PRIMARY) HYPERTENSION: ICD-10-CM

## 2024-10-31 DIAGNOSIS — N13.8 BENIGN PROSTATIC HYPERPLASIA WITH LOWER URINARY TRACT SYMPMS: ICD-10-CM

## 2024-10-31 DIAGNOSIS — N40.1 BENIGN PROSTATIC HYPERPLASIA WITH LOWER URINARY TRACT SYMPMS: ICD-10-CM

## 2024-10-31 DIAGNOSIS — R53.83 OTHER MALAISE: ICD-10-CM

## 2024-10-31 DIAGNOSIS — E11.9 TYPE 2 DIABETES MELLITUS W/OUT COMPLICATIONS: ICD-10-CM

## 2024-10-31 DIAGNOSIS — E11.42 TYPE 2 DIABETES MELLITUS WITH DIABETIC POLYNEUROPATHY: ICD-10-CM

## 2024-10-31 DIAGNOSIS — E78.5 HYPERLIPIDEMIA, UNSPECIFIED: ICD-10-CM

## 2024-10-31 DIAGNOSIS — T07.XXXA UNSPECIFIED MULTIPLE INJURIES, INITIAL ENCOUNTER: ICD-10-CM

## 2024-10-31 PROCEDURE — G2211 COMPLEX E/M VISIT ADD ON: CPT

## 2024-10-31 PROCEDURE — 83036 HEMOGLOBIN GLYCOSYLATED A1C: CPT | Mod: QW

## 2024-10-31 PROCEDURE — 99214 OFFICE O/P EST MOD 30 MIN: CPT

## 2024-10-31 PROCEDURE — G0008: CPT

## 2024-10-31 PROCEDURE — 90662 IIV NO PRSV INCREASED AG IM: CPT

## 2024-10-31 PROCEDURE — 36415 COLL VENOUS BLD VENIPUNCTURE: CPT

## 2024-10-31 RX ORDER — TAMSULOSIN HYDROCHLORIDE 0.4 MG/1
0.4 CAPSULE ORAL
Qty: 90 | Refills: 1 | Status: ACTIVE | COMMUNITY
Start: 2024-10-31 | End: 1900-01-01

## 2024-11-04 ENCOUNTER — RX RENEWAL (OUTPATIENT)
Age: 69
End: 2024-11-04

## 2024-11-04 LAB
ALBUMIN SERPL ELPH-MCNC: 4.6 G/DL
ALP BLD-CCNC: 122 U/L
ALT SERPL-CCNC: 19 U/L
ANION GAP SERPL CALC-SCNC: 16 MMOL/L
AST SERPL-CCNC: 24 U/L
BILIRUB SERPL-MCNC: 0.6 MG/DL
BUN SERPL-MCNC: 10 MG/DL
CALCIUM SERPL-MCNC: 10.1 MG/DL
CHLORIDE SERPL-SCNC: 101 MMOL/L
CO2 SERPL-SCNC: 23 MMOL/L
CREAT SERPL-MCNC: 0.76 MG/DL
CRP SERPL-MCNC: <3 MG/L
EGFR: 96 ML/MIN/1.73M2
ERYTHROCYTE [SEDIMENTATION RATE] IN BLOOD BY WESTERGREN METHOD: 57 MM/HR
GLUCOSE SERPL-MCNC: 114 MG/DL
HCT VFR BLD CALC: 39.3 %
HGB BLD-MCNC: 12.4 G/DL
MCHC RBC-ENTMCNC: 27.3 PG
MCHC RBC-ENTMCNC: 31.6 G/DL
MCV RBC AUTO: 86.6 FL
PLATELET # BLD AUTO: 284 K/UL
POTASSIUM SERPL-SCNC: 3.9 MMOL/L
PROT SERPL-MCNC: 7.8 G/DL
RBC # BLD: 4.54 M/UL
RBC # FLD: 15.1 %
SODIUM SERPL-SCNC: 140 MMOL/L
TSH SERPL-ACNC: 2.1 UIU/ML
WBC # FLD AUTO: 8.36 K/UL

## 2024-11-05 LAB — ANA SER IF-ACNC: NEGATIVE

## 2024-11-08 ENCOUNTER — APPOINTMENT (OUTPATIENT)
Dept: RADIOLOGY | Facility: CLINIC | Age: 69
End: 2024-11-08
Payer: MEDICARE

## 2024-11-08 ENCOUNTER — OUTPATIENT (OUTPATIENT)
Dept: OUTPATIENT SERVICES | Facility: HOSPITAL | Age: 69
LOS: 1 days | End: 2024-11-08
Payer: COMMERCIAL

## 2024-11-08 DIAGNOSIS — M54.50 LOW BACK PAIN, UNSPECIFIED: ICD-10-CM

## 2024-11-08 PROCEDURE — 72114 X-RAY EXAM L-S SPINE BENDING: CPT

## 2024-11-08 PROCEDURE — 72114 X-RAY EXAM L-S SPINE BENDING: CPT | Mod: 26

## 2024-11-12 ENCOUNTER — RX RENEWAL (OUTPATIENT)
Age: 69
End: 2024-11-12

## 2024-11-21 ENCOUNTER — APPOINTMENT (OUTPATIENT)
Dept: CARDIOLOGY | Facility: CLINIC | Age: 69
End: 2024-11-21

## 2024-11-21 PROCEDURE — 78452 HT MUSCLE IMAGE SPECT MULT: CPT

## 2024-11-21 PROCEDURE — 93015 CV STRESS TEST SUPVJ I&R: CPT

## 2024-11-21 PROCEDURE — A9502: CPT

## 2024-12-02 NOTE — ED ADULT TRIAGE NOTE - WEIGHT METHOD
Armando presents today for her 36 week visit. She is currently 36w1d  Vitals:    12/02/24 1400   BP: 102/60         A GBS culture was not collected- GBS in urine     Labor: I have reviewed the signs and symptoms of labor with the patient, including contractions q4-5 minutes for greater than 1 hour, vaginal bleeding, leaking fluid and decreased fetal movement.  I have emphasized the continued importance of paying close attention to the baby's movements.  I have instructed the patient to call the office with any of the above symptoms.     stated

## 2024-12-06 ENCOUNTER — NON-APPOINTMENT (OUTPATIENT)
Age: 69
End: 2024-12-06

## 2024-12-17 ENCOUNTER — OUTPATIENT (OUTPATIENT)
Dept: OUTPATIENT SERVICES | Facility: HOSPITAL | Age: 69
LOS: 1 days | End: 2024-12-17
Payer: COMMERCIAL

## 2024-12-17 ENCOUNTER — APPOINTMENT (OUTPATIENT)
Dept: RADIOLOGY | Facility: CLINIC | Age: 69
End: 2024-12-17

## 2024-12-17 ENCOUNTER — APPOINTMENT (OUTPATIENT)
Dept: FAMILY MEDICINE | Facility: CLINIC | Age: 69
End: 2024-12-17
Payer: MEDICARE

## 2024-12-17 VITALS
DIASTOLIC BLOOD PRESSURE: 74 MMHG | RESPIRATION RATE: 14 BRPM | HEIGHT: 66 IN | WEIGHT: 194 LBS | OXYGEN SATURATION: 98 % | SYSTOLIC BLOOD PRESSURE: 130 MMHG | HEART RATE: 99 BPM | BODY MASS INDEX: 31.18 KG/M2

## 2024-12-17 DIAGNOSIS — M25.562 PAIN IN RIGHT KNEE: ICD-10-CM

## 2024-12-17 DIAGNOSIS — M25.561 PAIN IN RIGHT KNEE: ICD-10-CM

## 2024-12-17 DIAGNOSIS — K40.90 UNILATERAL INGUINAL HERNIA, W/OUT OBSTRUCTION OR GANGRENE, NOT SPECIFIED AS RECURRENT: ICD-10-CM

## 2024-12-17 DIAGNOSIS — R23.2 FLUSHING: ICD-10-CM

## 2024-12-17 DIAGNOSIS — Z87.898 PERSONAL HISTORY OF OTHER SPECIFIED CONDITIONS: ICD-10-CM

## 2024-12-17 DIAGNOSIS — M25.422 EFFUSION, LEFT ELBOW: ICD-10-CM

## 2024-12-17 DIAGNOSIS — K43.9 VENTRAL HERNIA W/OUT OBSTRUCTION OR GANGRENE: ICD-10-CM

## 2024-12-17 PROCEDURE — 36415 COLL VENOUS BLD VENIPUNCTURE: CPT

## 2024-12-17 PROCEDURE — 83036 HEMOGLOBIN GLYCOSYLATED A1C: CPT | Mod: QW

## 2024-12-17 PROCEDURE — 73070 X-RAY EXAM OF ELBOW: CPT

## 2024-12-17 PROCEDURE — 99214 OFFICE O/P EST MOD 30 MIN: CPT

## 2024-12-17 PROCEDURE — G2211 COMPLEX E/M VISIT ADD ON: CPT

## 2024-12-17 PROCEDURE — 73070 X-RAY EXAM OF ELBOW: CPT | Mod: 26,LT

## 2024-12-18 DIAGNOSIS — D64.9 ANEMIA, UNSPECIFIED: ICD-10-CM

## 2024-12-18 LAB
ALBUMIN SERPL ELPH-MCNC: 4.2 G/DL
ALP BLD-CCNC: 127 U/L
ALT SERPL-CCNC: 19 U/L
ANION GAP SERPL CALC-SCNC: 11 MMOL/L
AST SERPL-CCNC: 23 U/L
BILIRUB SERPL-MCNC: 0.4 MG/DL
BUN SERPL-MCNC: 11 MG/DL
CALCIUM SERPL-MCNC: 9.7 MG/DL
CHLORIDE SERPL-SCNC: 101 MMOL/L
CO2 SERPL-SCNC: 26 MMOL/L
CREAT SERPL-MCNC: 0.68 MG/DL
EGFR: 99 ML/MIN/1.73M2
GLUCOSE SERPL-MCNC: 146 MG/DL
HCT VFR BLD CALC: 36.6 %
HGB BLD-MCNC: 11.4 G/DL
MCHC RBC-ENTMCNC: 27 PG
MCHC RBC-ENTMCNC: 31.1 G/DL
MCV RBC AUTO: 86.5 FL
PLATELET # BLD AUTO: 244 K/UL
POTASSIUM SERPL-SCNC: 4.3 MMOL/L
PROT SERPL-MCNC: 7.4 G/DL
RBC # BLD: 4.23 M/UL
RBC # FLD: 14.5 %
SODIUM SERPL-SCNC: 139 MMOL/L
TSH SERPL-ACNC: 1.67 UIU/ML
WBC # FLD AUTO: 6.84 K/UL

## 2024-12-18 RX ORDER — CHLORHEXIDINE GLUCONATE 4 %
325 (65 FE) LIQUID (ML) TOPICAL
Qty: 90 | Refills: 3 | Status: ACTIVE | COMMUNITY
Start: 2024-12-18 | End: 1900-01-01

## 2025-01-06 ENCOUNTER — APPOINTMENT (OUTPATIENT)
Dept: FAMILY MEDICINE | Facility: CLINIC | Age: 70
End: 2025-01-06

## 2025-01-09 ENCOUNTER — APPOINTMENT (OUTPATIENT)
Dept: ORTHOPEDIC SURGERY | Facility: CLINIC | Age: 70
End: 2025-01-09
Payer: MEDICARE

## 2025-01-09 DIAGNOSIS — M70.22 OLECRANON BURSITIS, LEFT ELBOW: ICD-10-CM

## 2025-01-09 PROCEDURE — 99213 OFFICE O/P EST LOW 20 MIN: CPT

## 2025-01-23 ENCOUNTER — APPOINTMENT (OUTPATIENT)
Dept: FAMILY MEDICINE | Facility: CLINIC | Age: 70
End: 2025-01-23

## 2025-02-19 ENCOUNTER — RX RENEWAL (OUTPATIENT)
Age: 70
End: 2025-02-19

## 2025-02-25 ENCOUNTER — RX RENEWAL (OUTPATIENT)
Age: 70
End: 2025-02-25

## 2025-03-21 ENCOUNTER — APPOINTMENT (OUTPATIENT)
Dept: FAMILY MEDICINE | Facility: CLINIC | Age: 70
End: 2025-03-21
Payer: MEDICARE

## 2025-03-21 VITALS
BODY MASS INDEX: 31.82 KG/M2 | WEIGHT: 198 LBS | RESPIRATION RATE: 16 BRPM | SYSTOLIC BLOOD PRESSURE: 128 MMHG | OXYGEN SATURATION: 97 % | DIASTOLIC BLOOD PRESSURE: 74 MMHG | HEIGHT: 66 IN | HEART RATE: 93 BPM | TEMPERATURE: 97.5 F

## 2025-03-21 DIAGNOSIS — M25.561 PAIN IN RIGHT KNEE: ICD-10-CM

## 2025-03-21 DIAGNOSIS — M47.816 SPONDYLOSIS W/OUT MYELOPATHY OR RADICULOPATHY, LUMBAR REGION: ICD-10-CM

## 2025-03-21 DIAGNOSIS — K21.9 GASTRO-ESOPHAGEAL REFLUX DISEASE W/OUT ESOPHAGITIS: ICD-10-CM

## 2025-03-21 DIAGNOSIS — K43.9 VENTRAL HERNIA W/OUT OBSTRUCTION OR GANGRENE: ICD-10-CM

## 2025-03-21 DIAGNOSIS — E11.9 TYPE 2 DIABETES MELLITUS W/OUT COMPLICATIONS: ICD-10-CM

## 2025-03-21 DIAGNOSIS — K42.9 UMBILICAL HERNIA W/OUT OBSTRUCTION OR GANGRENE: ICD-10-CM

## 2025-03-21 DIAGNOSIS — G89.29 PAIN IN RIGHT KNEE: ICD-10-CM

## 2025-03-21 DIAGNOSIS — L98.9 DISORDER OF THE SKIN AND SUBCUTANEOUS TISSUE, UNSPECIFIED: ICD-10-CM

## 2025-03-21 DIAGNOSIS — R23.2 FLUSHING: ICD-10-CM

## 2025-03-21 DIAGNOSIS — E78.5 HYPERLIPIDEMIA, UNSPECIFIED: ICD-10-CM

## 2025-03-21 DIAGNOSIS — M25.562 PAIN IN RIGHT KNEE: ICD-10-CM

## 2025-03-21 DIAGNOSIS — I10 ESSENTIAL (PRIMARY) HYPERTENSION: ICD-10-CM

## 2025-03-21 LAB — HBA1C MFR BLD HPLC: 6.1

## 2025-03-21 PROCEDURE — 83036 HEMOGLOBIN GLYCOSYLATED A1C: CPT | Mod: QW

## 2025-03-21 PROCEDURE — G2211 COMPLEX E/M VISIT ADD ON: CPT

## 2025-03-21 PROCEDURE — 36415 COLL VENOUS BLD VENIPUNCTURE: CPT

## 2025-03-21 PROCEDURE — 99214 OFFICE O/P EST MOD 30 MIN: CPT

## 2025-03-24 ENCOUNTER — RX RENEWAL (OUTPATIENT)
Age: 70
End: 2025-03-24

## 2025-03-24 LAB
ALBUMIN SERPL ELPH-MCNC: 4.6 G/DL
ALP BLD-CCNC: 128 U/L
ALT SERPL-CCNC: 19 U/L
ANION GAP SERPL CALC-SCNC: 15 MMOL/L
AST SERPL-CCNC: 24 U/L
BILIRUB SERPL-MCNC: 0.4 MG/DL
BUN SERPL-MCNC: 11 MG/DL
CALCIUM SERPL-MCNC: 9.8 MG/DL
CHLORIDE SERPL-SCNC: 102 MMOL/L
CO2 SERPL-SCNC: 24 MMOL/L
CREAT SERPL-MCNC: 0.76 MG/DL
EGFRCR SERPLBLD CKD-EPI 2021: 96 ML/MIN/1.73M2
GLUCOSE SERPL-MCNC: 92 MG/DL
POTASSIUM SERPL-SCNC: 4.9 MMOL/L
PROT SERPL-MCNC: 7.4 G/DL
SODIUM SERPL-SCNC: 141 MMOL/L
TSH SERPL-ACNC: 1.78 UIU/ML

## 2025-04-09 ENCOUNTER — APPOINTMENT (OUTPATIENT)
Dept: CARDIOLOGY | Facility: CLINIC | Age: 70
End: 2025-04-09

## 2025-04-09 ENCOUNTER — NON-APPOINTMENT (OUTPATIENT)
Age: 70
End: 2025-04-09

## 2025-04-09 VITALS
WEIGHT: 198 LBS | HEART RATE: 105 BPM | DIASTOLIC BLOOD PRESSURE: 82 MMHG | HEIGHT: 66 IN | BODY MASS INDEX: 31.82 KG/M2 | SYSTOLIC BLOOD PRESSURE: 132 MMHG | OXYGEN SATURATION: 96 %

## 2025-04-09 DIAGNOSIS — E66.9 OBESITY, UNSPECIFIED: ICD-10-CM

## 2025-04-09 DIAGNOSIS — Z13.6 ENCOUNTER FOR SCREENING FOR CARDIOVASCULAR DISORDERS: ICD-10-CM

## 2025-04-09 DIAGNOSIS — E78.5 HYPERLIPIDEMIA, UNSPECIFIED: ICD-10-CM

## 2025-04-09 DIAGNOSIS — R07.89 OTHER CHEST PAIN: ICD-10-CM

## 2025-04-09 DIAGNOSIS — Z13.31 ENCOUNTER FOR SCREENING FOR DEPRESSION: ICD-10-CM

## 2025-04-09 DIAGNOSIS — Z13.39 ENCOUNTER FOR SCREENING EXAM FOR OTHER MENTAL HEALTH AND BEHAVIORAL DISORDERS: ICD-10-CM

## 2025-04-09 DIAGNOSIS — I10 ESSENTIAL (PRIMARY) HYPERTENSION: ICD-10-CM

## 2025-04-09 DIAGNOSIS — I25.10 ATHEROSCLEROTIC HEART DISEASE OF NATIVE CORONARY ARTERY W/OUT ANGINA PECTORIS: ICD-10-CM

## 2025-04-09 DIAGNOSIS — R39.9 UNSPECIFIED SYMPTOMS AND SIGNS INVOLVING THE GENITOURINARY SYSTEM: ICD-10-CM

## 2025-04-09 PROCEDURE — 99205 OFFICE O/P NEW HI 60 MIN: CPT

## 2025-04-09 PROCEDURE — G0446: CPT | Mod: 59

## 2025-04-09 PROCEDURE — G0442 ANNUAL ALCOHOL SCREEN 15 MIN: CPT

## 2025-04-09 PROCEDURE — G0537: CPT

## 2025-04-09 PROCEDURE — G2211 COMPLEX E/M VISIT ADD ON: CPT | Mod: 59

## 2025-04-09 PROCEDURE — 93000 ELECTROCARDIOGRAM COMPLETE: CPT | Mod: 59

## 2025-04-09 PROCEDURE — G0447 BEHAVIOR COUNSEL OBESITY 15M: CPT

## 2025-04-09 PROCEDURE — 93242 EXT ECG>48HR<7D RECORDING: CPT

## 2025-04-09 PROCEDURE — G0444 DEPRESSION SCREEN ANNUAL: CPT

## 2025-04-11 LAB
ERYTHROCYTE [SEDIMENTATION RATE] IN BLOOD BY WESTERGREN METHOD: 15 MM/HR
HCT VFR BLD CALC: 38.9 %
HGB BLD-MCNC: 12.7 G/DL
MCHC RBC-ENTMCNC: 27.9 PG
MCHC RBC-ENTMCNC: 32.6 G/DL
MCV RBC AUTO: 85.3 FL
PLATELET # BLD AUTO: 259 K/UL
RBC # BLD: 4.56 M/UL
RBC # FLD: 13.6 %
WBC # FLD AUTO: 6.54 K/UL

## 2025-04-12 LAB
APO B SERPL-MCNC: 38 MG/DL
APPEARANCE: CLEAR
BILIRUBIN URINE: NEGATIVE
BLOOD URINE: NEGATIVE
CHOLEST SERPL-MCNC: 99 MG/DL
COLOR: YELLOW
CRP SERPL-MCNC: <3 MG/L
GLUCOSE QUALITATIVE U: NEGATIVE MG/DL
HDLC SERPL-MCNC: 50 MG/DL
KETONES URINE: NEGATIVE MG/DL
LDLC SERPL-MCNC: 34 MG/DL
LEUKOCYTE ESTERASE URINE: NEGATIVE
NITRITE URINE: NEGATIVE
NONHDLC SERPL-MCNC: 48 MG/DL
NT-PROBNP SERPL-MCNC: 79 PG/ML
PH URINE: 5.5
PROTEIN URINE: NORMAL MG/DL
SPECIFIC GRAVITY URINE: 1.02
TRIGL SERPL-MCNC: 62 MG/DL
UROBILINOGEN URINE: 0.2 MG/DL

## 2025-04-14 LAB — APO LP(A) SERPL-MCNC: 29.4 NMOL/L

## 2025-04-23 PROCEDURE — 93244 EXT ECG>48HR<7D REV&INTERPJ: CPT

## 2025-04-28 ENCOUNTER — NON-APPOINTMENT (OUTPATIENT)
Age: 70
End: 2025-04-28

## 2025-04-28 ENCOUNTER — APPOINTMENT (OUTPATIENT)
Dept: CARDIOLOGY | Facility: CLINIC | Age: 70
End: 2025-04-28
Payer: MEDICARE

## 2025-04-28 VITALS
WEIGHT: 190 LBS | HEART RATE: 109 BPM | OXYGEN SATURATION: 98 % | DIASTOLIC BLOOD PRESSURE: 78 MMHG | HEIGHT: 66 IN | SYSTOLIC BLOOD PRESSURE: 130 MMHG | BODY MASS INDEX: 30.53 KG/M2

## 2025-04-28 DIAGNOSIS — E78.5 HYPERLIPIDEMIA, UNSPECIFIED: ICD-10-CM

## 2025-04-28 DIAGNOSIS — I25.10 ATHEROSCLEROTIC HEART DISEASE OF NATIVE CORONARY ARTERY W/OUT ANGINA PECTORIS: ICD-10-CM

## 2025-04-28 DIAGNOSIS — R07.89 OTHER CHEST PAIN: ICD-10-CM

## 2025-04-28 DIAGNOSIS — I10 ESSENTIAL (PRIMARY) HYPERTENSION: ICD-10-CM

## 2025-04-28 PROCEDURE — 93000 ELECTROCARDIOGRAM COMPLETE: CPT

## 2025-04-28 PROCEDURE — 99214 OFFICE O/P EST MOD 30 MIN: CPT

## 2025-04-28 RX ORDER — IBUPROFEN 400 MG/1
400 TABLET, FILM COATED ORAL
Qty: 21 | Refills: 0 | Status: ACTIVE | COMMUNITY
Start: 2025-04-28 | End: 1900-01-01

## 2025-05-10 ENCOUNTER — EMERGENCY (EMERGENCY)
Facility: HOSPITAL | Age: 70
LOS: 1 days | End: 2025-05-10
Attending: EMERGENCY MEDICINE
Payer: COMMERCIAL

## 2025-05-10 VITALS
RESPIRATION RATE: 18 BRPM | SYSTOLIC BLOOD PRESSURE: 123 MMHG | DIASTOLIC BLOOD PRESSURE: 82 MMHG | OXYGEN SATURATION: 98 % | TEMPERATURE: 98 F | HEART RATE: 76 BPM

## 2025-05-10 VITALS
DIASTOLIC BLOOD PRESSURE: 89 MMHG | SYSTOLIC BLOOD PRESSURE: 154 MMHG | WEIGHT: 194.01 LBS | HEART RATE: 101 BPM | RESPIRATION RATE: 20 BRPM | OXYGEN SATURATION: 97 % | TEMPERATURE: 98 F | HEIGHT: 67 IN

## 2025-05-10 DIAGNOSIS — R07.9 CHEST PAIN, UNSPECIFIED: ICD-10-CM

## 2025-05-10 LAB
ALBUMIN SERPL ELPH-MCNC: 4 G/DL — SIGNIFICANT CHANGE UP (ref 3.3–5.2)
ALP SERPL-CCNC: 103 U/L — SIGNIFICANT CHANGE UP (ref 40–120)
ALT FLD-CCNC: 17 U/L — SIGNIFICANT CHANGE UP
ANION GAP SERPL CALC-SCNC: 14 MMOL/L — SIGNIFICANT CHANGE UP (ref 5–17)
AST SERPL-CCNC: 26 U/L — SIGNIFICANT CHANGE UP
BASOPHILS # BLD AUTO: 0.04 K/UL — SIGNIFICANT CHANGE UP (ref 0–0.2)
BASOPHILS NFR BLD AUTO: 0.5 % — SIGNIFICANT CHANGE UP (ref 0–2)
BILIRUB SERPL-MCNC: 0.5 MG/DL — SIGNIFICANT CHANGE UP (ref 0.4–2)
BUN SERPL-MCNC: 12.5 MG/DL — SIGNIFICANT CHANGE UP (ref 8–20)
CALCIUM SERPL-MCNC: 9.3 MG/DL — SIGNIFICANT CHANGE UP (ref 8.4–10.5)
CHLORIDE SERPL-SCNC: 104 MMOL/L — SIGNIFICANT CHANGE UP (ref 96–108)
CO2 SERPL-SCNC: 23 MMOL/L — SIGNIFICANT CHANGE UP (ref 22–29)
CREAT SERPL-MCNC: 0.75 MG/DL — SIGNIFICANT CHANGE UP (ref 0.5–1.3)
EGFR: 96 ML/MIN/1.73M2 — SIGNIFICANT CHANGE UP
EGFR: 96 ML/MIN/1.73M2 — SIGNIFICANT CHANGE UP
EOSINOPHIL # BLD AUTO: 0.08 K/UL — SIGNIFICANT CHANGE UP (ref 0–0.5)
EOSINOPHIL NFR BLD AUTO: 1 % — SIGNIFICANT CHANGE UP (ref 0–6)
GLUCOSE SERPL-MCNC: 93 MG/DL — SIGNIFICANT CHANGE UP (ref 70–99)
HCT VFR BLD CALC: 35.8 % — LOW (ref 39–50)
HGB BLD-MCNC: 11.5 G/DL — LOW (ref 13–17)
IMM GRANULOCYTES # BLD AUTO: 0.02 K/UL — SIGNIFICANT CHANGE UP (ref 0–0.07)
IMM GRANULOCYTES NFR BLD AUTO: 0.3 % — SIGNIFICANT CHANGE UP (ref 0–0.9)
LIDOCAIN IGE QN: 27 U/L — SIGNIFICANT CHANGE UP (ref 22–51)
LYMPHOCYTES # BLD AUTO: 1.31 K/UL — SIGNIFICANT CHANGE UP (ref 1–3.3)
LYMPHOCYTES NFR BLD AUTO: 16.7 % — SIGNIFICANT CHANGE UP (ref 13–44)
MCHC RBC-ENTMCNC: 27.1 PG — SIGNIFICANT CHANGE UP (ref 27–34)
MCHC RBC-ENTMCNC: 32.1 G/DL — SIGNIFICANT CHANGE UP (ref 32–36)
MCV RBC AUTO: 84.4 FL — SIGNIFICANT CHANGE UP (ref 80–100)
MONOCYTES # BLD AUTO: 0.72 K/UL — SIGNIFICANT CHANGE UP (ref 0–0.9)
MONOCYTES NFR BLD AUTO: 9.2 % — SIGNIFICANT CHANGE UP (ref 2–14)
NEUTROPHILS # BLD AUTO: 5.68 K/UL — SIGNIFICANT CHANGE UP (ref 1.8–7.4)
NEUTROPHILS NFR BLD AUTO: 72.3 % — SIGNIFICANT CHANGE UP (ref 43–77)
NRBC # BLD AUTO: 0 K/UL — SIGNIFICANT CHANGE UP (ref 0–0)
NRBC # FLD: 0 K/UL — SIGNIFICANT CHANGE UP (ref 0–0)
NRBC BLD AUTO-RTO: 0 /100 WBCS — SIGNIFICANT CHANGE UP (ref 0–0)
NT-PROBNP SERPL-SCNC: 68 PG/ML — SIGNIFICANT CHANGE UP (ref 0–300)
PLATELET # BLD AUTO: 225 K/UL — SIGNIFICANT CHANGE UP (ref 150–400)
PMV BLD: 11.2 FL — SIGNIFICANT CHANGE UP (ref 7–13)
POTASSIUM SERPL-MCNC: 3.9 MMOL/L — SIGNIFICANT CHANGE UP (ref 3.5–5.3)
POTASSIUM SERPL-SCNC: 3.9 MMOL/L — SIGNIFICANT CHANGE UP (ref 3.5–5.3)
PROT SERPL-MCNC: 7 G/DL — SIGNIFICANT CHANGE UP (ref 6.6–8.7)
RBC # BLD: 4.24 M/UL — SIGNIFICANT CHANGE UP (ref 4.2–5.8)
RBC # FLD: 13.6 % — SIGNIFICANT CHANGE UP (ref 10.3–14.5)
SODIUM SERPL-SCNC: 140 MMOL/L — SIGNIFICANT CHANGE UP (ref 135–145)
TROPONIN T, HIGH SENSITIVITY RESULT: 16 NG/L — SIGNIFICANT CHANGE UP (ref 0–51)
WBC # BLD: 7.85 K/UL — SIGNIFICANT CHANGE UP (ref 3.8–10.5)
WBC # FLD AUTO: 7.85 K/UL — SIGNIFICANT CHANGE UP (ref 3.8–10.5)

## 2025-05-10 PROCEDURE — 83880 ASSAY OF NATRIURETIC PEPTIDE: CPT

## 2025-05-10 PROCEDURE — 86140 C-REACTIVE PROTEIN: CPT

## 2025-05-10 PROCEDURE — 36415 COLL VENOUS BLD VENIPUNCTURE: CPT

## 2025-05-10 PROCEDURE — 80053 COMPREHEN METABOLIC PANEL: CPT

## 2025-05-10 PROCEDURE — 82553 CREATINE MB FRACTION: CPT

## 2025-05-10 PROCEDURE — 99285 EMERGENCY DEPT VISIT HI MDM: CPT

## 2025-05-10 PROCEDURE — 93010 ELECTROCARDIOGRAM REPORT: CPT

## 2025-05-10 PROCEDURE — 83690 ASSAY OF LIPASE: CPT

## 2025-05-10 PROCEDURE — 99284 EMERGENCY DEPT VISIT MOD MDM: CPT | Mod: 25

## 2025-05-10 PROCEDURE — 84484 ASSAY OF TROPONIN QUANT: CPT

## 2025-05-10 PROCEDURE — 85025 COMPLETE CBC W/AUTO DIFF WBC: CPT

## 2025-05-10 PROCEDURE — 85652 RBC SED RATE AUTOMATED: CPT

## 2025-05-10 PROCEDURE — 82550 ASSAY OF CK (CPK): CPT

## 2025-05-10 PROCEDURE — 93005 ELECTROCARDIOGRAM TRACING: CPT

## 2025-05-10 RX ORDER — DIAZEPAM 5 MG/1
1 TABLET ORAL
Qty: 15 | Refills: 0
Start: 2025-05-10 | End: 2025-05-14

## 2025-05-10 RX ORDER — UBIDECARENONE 100 MG
1 CAPSULE ORAL
Qty: 30 | Refills: 0
Start: 2025-05-10 | End: 2025-06-08

## 2025-05-11 PROCEDURE — 99285 EMERGENCY DEPT VISIT HI MDM: CPT

## 2025-05-13 ENCOUNTER — RX RENEWAL (OUTPATIENT)
Age: 70
End: 2025-05-13

## 2025-05-21 ENCOUNTER — RX RENEWAL (OUTPATIENT)
Age: 70
End: 2025-05-21

## 2025-06-26 ENCOUNTER — RX RENEWAL (OUTPATIENT)
Age: 70
End: 2025-06-26

## 2025-06-30 ENCOUNTER — APPOINTMENT (OUTPATIENT)
Dept: CARDIOLOGY | Facility: CLINIC | Age: 70
End: 2025-06-30
Payer: MEDICARE

## 2025-06-30 VITALS — WEIGHT: 195 LBS | BODY MASS INDEX: 31.34 KG/M2 | HEIGHT: 66 IN

## 2025-06-30 VITALS — HEART RATE: 112 BPM | DIASTOLIC BLOOD PRESSURE: 91 MMHG | OXYGEN SATURATION: 98 % | SYSTOLIC BLOOD PRESSURE: 148 MMHG

## 2025-06-30 VITALS — DIASTOLIC BLOOD PRESSURE: 70 MMHG | SYSTOLIC BLOOD PRESSURE: 140 MMHG

## 2025-06-30 PROCEDURE — 99401 PREV MED CNSL INDIV APPRX 15: CPT

## 2025-06-30 PROCEDURE — G2211 COMPLEX E/M VISIT ADD ON: CPT

## 2025-06-30 PROCEDURE — G0447 BEHAVIOR COUNSEL OBESITY 15M: CPT

## 2025-06-30 PROCEDURE — 99214 OFFICE O/P EST MOD 30 MIN: CPT | Mod: 25

## 2025-06-30 PROCEDURE — 93000 ELECTROCARDIOGRAM COMPLETE: CPT

## 2025-06-30 RX ORDER — PRAVASTATIN SODIUM 20 MG/1
20 TABLET ORAL
Refills: 0 | Status: ACTIVE | COMMUNITY

## 2025-07-11 ENCOUNTER — TRANSCRIPTION ENCOUNTER (OUTPATIENT)
Age: 70
End: 2025-07-11

## 2025-07-11 RX ORDER — BACLOFEN 10 MG/1
10 TABLET ORAL 3 TIMES DAILY
Qty: 30 | Refills: 1 | Status: ACTIVE | COMMUNITY
Start: 2025-07-11 | End: 1900-01-01

## 2025-07-22 ENCOUNTER — NON-APPOINTMENT (OUTPATIENT)
Age: 70
End: 2025-07-22

## 2025-07-22 ENCOUNTER — EMERGENCY (EMERGENCY)
Facility: HOSPITAL | Age: 70
LOS: 1 days | End: 2025-07-22
Attending: EMERGENCY MEDICINE
Payer: COMMERCIAL

## 2025-07-22 VITALS
RESPIRATION RATE: 16 BRPM | HEART RATE: 103 BPM | WEIGHT: 197.09 LBS | DIASTOLIC BLOOD PRESSURE: 83 MMHG | SYSTOLIC BLOOD PRESSURE: 156 MMHG | OXYGEN SATURATION: 97 % | TEMPERATURE: 98 F

## 2025-07-22 VITALS
TEMPERATURE: 98 F | RESPIRATION RATE: 16 BRPM | HEART RATE: 83 BPM | OXYGEN SATURATION: 97 % | SYSTOLIC BLOOD PRESSURE: 144 MMHG | DIASTOLIC BLOOD PRESSURE: 83 MMHG

## 2025-07-22 LAB
A1C WITH ESTIMATED AVERAGE GLUCOSE RESULT: 6.5 % — HIGH (ref 4–5.6)
ALBUMIN SERPL ELPH-MCNC: 4.2 G/DL — SIGNIFICANT CHANGE UP (ref 3.3–5.2)
ALP SERPL-CCNC: 122 U/L — HIGH (ref 40–120)
ALT FLD-CCNC: 15 U/L — SIGNIFICANT CHANGE UP
ANION GAP SERPL CALC-SCNC: 14 MMOL/L — SIGNIFICANT CHANGE UP (ref 5–17)
APTT BLD: 36.9 SEC — HIGH (ref 26.1–36.8)
AST SERPL-CCNC: 21 U/L — SIGNIFICANT CHANGE UP
BASOPHILS # BLD AUTO: 0.02 K/UL — SIGNIFICANT CHANGE UP (ref 0–0.2)
BASOPHILS NFR BLD AUTO: 0.2 % — SIGNIFICANT CHANGE UP (ref 0–2)
BILIRUB SERPL-MCNC: 0.3 MG/DL — LOW (ref 0.4–2)
BUN SERPL-MCNC: 12.2 MG/DL — SIGNIFICANT CHANGE UP (ref 8–20)
CALCIUM SERPL-MCNC: 9.5 MG/DL — SIGNIFICANT CHANGE UP (ref 8.4–10.5)
CHLORIDE SERPL-SCNC: 102 MMOL/L — SIGNIFICANT CHANGE UP (ref 96–108)
CO2 SERPL-SCNC: 24 MMOL/L — SIGNIFICANT CHANGE UP (ref 22–29)
CREAT SERPL-MCNC: 0.63 MG/DL — SIGNIFICANT CHANGE UP (ref 0.5–1.3)
EGFR: 101 ML/MIN/1.73M2 — SIGNIFICANT CHANGE UP
EGFR: 101 ML/MIN/1.73M2 — SIGNIFICANT CHANGE UP
EOSINOPHIL # BLD AUTO: 0.18 K/UL — SIGNIFICANT CHANGE UP (ref 0–0.5)
EOSINOPHIL NFR BLD AUTO: 1.9 % — SIGNIFICANT CHANGE UP (ref 0–6)
ESTIMATED AVERAGE GLUCOSE: 140 MG/DL — HIGH (ref 68–114)
GLUCOSE SERPL-MCNC: 99 MG/DL — SIGNIFICANT CHANGE UP (ref 70–99)
HCT VFR BLD CALC: 36.3 % — LOW (ref 39–50)
HGB BLD-MCNC: 11.8 G/DL — LOW (ref 13–17)
IMM GRANULOCYTES # BLD AUTO: 0.04 K/UL — SIGNIFICANT CHANGE UP (ref 0–0.07)
IMM GRANULOCYTES NFR BLD AUTO: 0.4 % — SIGNIFICANT CHANGE UP (ref 0–0.9)
INR BLD: 1.13 RATIO — SIGNIFICANT CHANGE UP (ref 0.85–1.16)
LYMPHOCYTES # BLD AUTO: 1.47 K/UL — SIGNIFICANT CHANGE UP (ref 1–3.3)
LYMPHOCYTES NFR BLD AUTO: 15.9 % — SIGNIFICANT CHANGE UP (ref 13–44)
MCHC RBC-ENTMCNC: 27.6 PG — SIGNIFICANT CHANGE UP (ref 27–34)
MCHC RBC-ENTMCNC: 32.5 G/DL — SIGNIFICANT CHANGE UP (ref 32–36)
MCV RBC AUTO: 84.8 FL — SIGNIFICANT CHANGE UP (ref 80–100)
MONOCYTES # BLD AUTO: 0.67 K/UL — SIGNIFICANT CHANGE UP (ref 0–0.9)
MONOCYTES NFR BLD AUTO: 7.2 % — SIGNIFICANT CHANGE UP (ref 2–14)
NEUTROPHILS # BLD AUTO: 6.88 K/UL — SIGNIFICANT CHANGE UP (ref 1.8–7.4)
NEUTROPHILS NFR BLD AUTO: 74.4 % — SIGNIFICANT CHANGE UP (ref 43–77)
NRBC # BLD AUTO: 0 K/UL — SIGNIFICANT CHANGE UP (ref 0–0)
NRBC # FLD: 0 K/UL — SIGNIFICANT CHANGE UP (ref 0–0)
NRBC BLD AUTO-RTO: 0 /100 WBCS — SIGNIFICANT CHANGE UP (ref 0–0)
NT-PROBNP SERPL-SCNC: 139 PG/ML — SIGNIFICANT CHANGE UP (ref 0–300)
PLATELET # BLD AUTO: 256 K/UL — SIGNIFICANT CHANGE UP (ref 150–400)
PMV BLD: 10.7 FL — SIGNIFICANT CHANGE UP (ref 7–13)
POTASSIUM SERPL-MCNC: 4.2 MMOL/L — SIGNIFICANT CHANGE UP (ref 3.5–5.3)
POTASSIUM SERPL-SCNC: 4.2 MMOL/L — SIGNIFICANT CHANGE UP (ref 3.5–5.3)
PROT SERPL-MCNC: 7.7 G/DL — SIGNIFICANT CHANGE UP (ref 6.6–8.7)
PROTHROM AB SERPL-ACNC: 12.8 SEC — SIGNIFICANT CHANGE UP (ref 9.9–13.4)
RAPID RVP RESULT: SIGNIFICANT CHANGE UP
RBC # BLD: 4.28 M/UL — SIGNIFICANT CHANGE UP (ref 4.2–5.8)
RBC # FLD: 13.7 % — SIGNIFICANT CHANGE UP (ref 10.3–14.5)
SARS-COV-2 RNA SPEC QL NAA+PROBE: SIGNIFICANT CHANGE UP
SODIUM SERPL-SCNC: 140 MMOL/L — SIGNIFICANT CHANGE UP (ref 135–145)
TROPONIN T, HIGH SENSITIVITY RESULT: 13 NG/L — SIGNIFICANT CHANGE UP (ref 0–51)
TROPONIN T, HIGH SENSITIVITY RESULT: 15 NG/L — SIGNIFICANT CHANGE UP (ref 0–51)
WBC # BLD: 9.26 K/UL — SIGNIFICANT CHANGE UP (ref 3.8–10.5)
WBC # FLD AUTO: 9.26 K/UL — SIGNIFICANT CHANGE UP (ref 3.8–10.5)

## 2025-07-22 PROCEDURE — 0225U NFCT DS DNA&RNA 21 SARSCOV2: CPT

## 2025-07-22 PROCEDURE — 93005 ELECTROCARDIOGRAM TRACING: CPT

## 2025-07-22 PROCEDURE — 85610 PROTHROMBIN TIME: CPT

## 2025-07-22 PROCEDURE — 84484 ASSAY OF TROPONIN QUANT: CPT

## 2025-07-22 PROCEDURE — 96375 TX/PRO/DX INJ NEW DRUG ADDON: CPT

## 2025-07-22 PROCEDURE — 80053 COMPREHEN METABOLIC PANEL: CPT

## 2025-07-22 PROCEDURE — 82553 CREATINE MB FRACTION: CPT

## 2025-07-22 PROCEDURE — 83036 HEMOGLOBIN GLYCOSYLATED A1C: CPT

## 2025-07-22 PROCEDURE — 99285 EMERGENCY DEPT VISIT HI MDM: CPT

## 2025-07-22 PROCEDURE — 71045 X-RAY EXAM CHEST 1 VIEW: CPT

## 2025-07-22 PROCEDURE — 71045 X-RAY EXAM CHEST 1 VIEW: CPT | Mod: 26

## 2025-07-22 PROCEDURE — 93010 ELECTROCARDIOGRAM REPORT: CPT

## 2025-07-22 PROCEDURE — 99285 EMERGENCY DEPT VISIT HI MDM: CPT | Mod: 25

## 2025-07-22 PROCEDURE — 94640 AIRWAY INHALATION TREATMENT: CPT

## 2025-07-22 PROCEDURE — 82550 ASSAY OF CK (CPK): CPT

## 2025-07-22 PROCEDURE — 83880 ASSAY OF NATRIURETIC PEPTIDE: CPT

## 2025-07-22 PROCEDURE — 85730 THROMBOPLASTIN TIME PARTIAL: CPT

## 2025-07-22 PROCEDURE — 85025 COMPLETE CBC W/AUTO DIFF WBC: CPT

## 2025-07-22 PROCEDURE — 96374 THER/PROPH/DIAG INJ IV PUSH: CPT

## 2025-07-22 PROCEDURE — 36415 COLL VENOUS BLD VENIPUNCTURE: CPT

## 2025-07-22 RX ORDER — ALBUTEROL SULFATE 2.5 MG/3ML
2 VIAL, NEBULIZER (ML) INHALATION
Qty: 1 | Refills: 0
Start: 2025-07-22

## 2025-07-22 RX ORDER — MAGNESIUM, ALUMINUM HYDROXIDE 200-200 MG
30 TABLET,CHEWABLE ORAL EVERY 4 HOURS
Refills: 0 | Status: DISCONTINUED | OUTPATIENT
Start: 2025-07-22 | End: 2025-07-30

## 2025-07-22 RX ORDER — KETOROLAC TROMETHAMINE 30 MG/ML
15 INJECTION, SOLUTION INTRAMUSCULAR; INTRAVENOUS ONCE
Refills: 0 | Status: DISCONTINUED | OUTPATIENT
Start: 2025-07-22 | End: 2025-07-22

## 2025-07-22 RX ORDER — AZITHROMYCIN 250 MG
1 CAPSULE ORAL
Qty: 1 | Refills: 0
Start: 2025-07-22

## 2025-07-22 RX ORDER — IPRATROPIUM BROMIDE AND ALBUTEROL SULFATE .5; 2.5 MG/3ML; MG/3ML
3 SOLUTION RESPIRATORY (INHALATION) ONCE
Refills: 0 | Status: COMPLETED | OUTPATIENT
Start: 2025-07-22 | End: 2025-07-22

## 2025-07-22 RX ADMIN — Medication 20 MILLIGRAM(S): at 18:23

## 2025-07-22 RX ADMIN — IPRATROPIUM BROMIDE AND ALBUTEROL SULFATE 3 MILLILITER(S): .5; 2.5 SOLUTION RESPIRATORY (INHALATION) at 18:23

## 2025-07-22 RX ADMIN — KETOROLAC TROMETHAMINE 15 MILLIGRAM(S): 30 INJECTION, SOLUTION INTRAMUSCULAR; INTRAVENOUS at 18:23

## 2025-07-22 NOTE — ED ADULT TRIAGE NOTE - CHIEF COMPLAINT QUOTE
Patient c/o cough, chest tightness x4 weeks. Patient is scheduled for an echo tomorrow with his cardiologist.

## 2025-07-22 NOTE — ED PROVIDER NOTE - CLINICAL SUMMARY MEDICAL DECISION MAKING FREE TEXT BOX
Gonzalo Guerrier is a 70 year old male w/ PmHx of HLD, CAD s/p PCI, DM (on metformin), abdominal hernia, chronic back pain/sciatica who presents to the ED for cough and chest tightness. Based on description of chest tightness and cough, currently treating with duo nebs, MAALOX, pepcid and ketorolac. Will monitor symptoms. Obtaining CBC, CMP, Hgb A1c, coags, CK, BNP, Troponins. CXR ordered. EKG shows NSR.

## 2025-07-22 NOTE — ED PROVIDER NOTE - WR ORDER NAME 1
[facility] sent Rx request for the following:      Requested Prescriptions   Pending Prescriptions Disp Refills    atorvastatin (LIPITOR) 10 MG tablet [Pharmacy Med Name: Atorvastatin Calcium 10 MG Oral Tablet] 100 tablet 2     Sig: TAKE 1 TABLET BY MOUTH DAILY       Antihyperlipidemic agents Passed - 8/28/2024  7:48 AM   Last Prescription Date:   10/5/23  Last Fill Qty/Refills:         90, R-4    Last Office Visit:              10/5/23   Future Office visit:            none    Babs Chadwick RN on 8/28/2024 at 7:49 AM       
Xray Chest 1 View- PORTABLE-Urgent

## 2025-07-22 NOTE — ED ADULT NURSE REASSESSMENT NOTE - NS ED NURSE REASSESS COMMENT FT1
rec pt. from day shift. pt. is aaox4. states he has had chest tightness for a few weeks. repeat trop sent. no acute resp distress noted. safety maintained.

## 2025-07-22 NOTE — ED ADULT NURSE NOTE - OBJECTIVE STATEMENT
Pt A+Ox4 c/o cough and chest tightness x4 weeks. Pt states that he is scheduled for an echo tomorrow with his cardiologist. Pt states occasional SOB, respirations equal and unlabored on room air. Pt on  and telebox at this time. Negative fevers, chills, body aches, ABD pain, N/V/D, dizziness, or HA. Pt left in position of comfort, wheels of stretcher locked and in the lowest position. Call bell within reach.

## 2025-07-22 NOTE — ED PROVIDER NOTE - OBJECTIVE STATEMENT
Gonzalo Guerrier is a 70 year old male w/ PmHx of HLD, CAD s/p PCI, DM (on metformin), abdominal hernia, chronic back pain/sciatica who presents to the ED for cough and chest tightness. Patient was last seen in [facility] in May 2025 for chest tightness. He has seen his outpatient cardiologist for similar symptoms for over a year. His cardiac workup thus far has been unremarkable: TTE (9/2024: LVEF (55-60%), NST (11/2024: wnl), EKG NSR. He states he was told by his cardiologist that his chest tightness was musculoskeletal and non-cardiac. He states his chest tightness is often located in the R chest, can wrap around his rib cage, and sometimes even come up from epigastric region. His previous labs have shown elevated CK. He was supposed to see a rheumatologist for further workup, but has not done so. He also has been having persistent abdominal discomfort from a chronic abdominal hernia. He was supposed to follow up with GI outpatient, but has not done so. His chronic back pain has also been causing him distress and occasional numbness along the thigh when he walks. He states he has not seen an orthopaedist, but has had back imaging done.     Today, he comes to the ER for persistent cough for 4 weeks. It is producing white sputum. It is worse when he lies down and awakens him from sleep. He states he feels less active than usual. He believes he had a cold cause he had congestion a few weeks back, but this has resolved. He denies fevers or chills. He states he sweats at night and has had increasing anxiety over this discomfort. He just wants "the pains to go away." He does not recall his last A1C. He is not on insulin.

## 2025-07-22 NOTE — ED PROVIDER NOTE - PHYSICAL EXAMINATION
General: Awake, alert, lying in bed in NAD  HEENT: Normocephalic, atraumatic. No scleral icterus or conjunctival injection. EOMI. Moist mucous membranes. Oropharynx clear.   Neck:. Soft and supple.  Cardiac: RRR, Peripheral pulses 2+ and symmetric. No LE edema.  Resp: Lungs CTAB. No accessory muscle use  Abd: Soft, non-tender. No guarding, rebound, or rigidity.  Back: Spine midline and non-tender.   Skin: No rashes, abrasions, or lacerations.  Neuro: AO x 4. Moves all extremities symmetrically. Motor strength and sensation grossly intact.  Psych: Appropriate mood and affect

## 2025-07-22 NOTE — ED PROVIDER NOTE - ATTENDING CONTRIBUTION TO CARE
I, Ameya Stanley, performed a face to face bedside interview with this patient regarding history of present illness, and completed an independent physical examination. I personally made/approved the management plan and take responsibility for the patient management. I have communicated the patient’s plan of care and disposition with the resident.  70-year-old male past medical history of CAD, hypertension presents with cough.  Patient notes she has had 4 weeks of cough productive of white sputum.  He reports associated tightness in his chest that is worse with coughing.  No shortness of breath, lower extremity edema, hemoptysis, fevers.    Gen: NAD, well appearing  CV: RRR  Pul: CTA b/l  Abd: Soft, non-distended, non-tender  Neuro: no focal deficits  Pt improved,  no hypoxia, no pneumonia, symptoms atypical for angina or CHF, stable for discharge and supportive care

## 2025-07-22 NOTE — ED PROVIDER NOTE - PATIENT PORTAL LINK FT
You can access the FollowMyHealth Patient Portal offered by [facility] by registering at the following website: [email or web address]. By joining [employer]’s FollowMyHealth portal, you will also be able to view your health information using other applications (apps) compatible with our system.

## 2025-07-22 NOTE — ED PROVIDER NOTE - NS ED ROS FT
Endorses cough, chest tightness, SOB  Denies fevers, chills, headache, congestion, runny nose, sore throat, palpitations

## 2025-07-23 ENCOUNTER — APPOINTMENT (OUTPATIENT)
Dept: CARDIOLOGY | Facility: CLINIC | Age: 70
End: 2025-07-23
Payer: MEDICARE

## 2025-07-23 PROCEDURE — 93306 TTE W/DOPPLER COMPLETE: CPT

## 2025-07-25 ENCOUNTER — NON-APPOINTMENT (OUTPATIENT)
Age: 70
End: 2025-07-25

## 2025-08-06 DIAGNOSIS — M25.50 PAIN IN UNSPECIFIED JOINT: ICD-10-CM

## 2025-08-08 ENCOUNTER — APPOINTMENT (OUTPATIENT)
Dept: FAMILY MEDICINE | Facility: CLINIC | Age: 70
End: 2025-08-08
Payer: MEDICARE

## 2025-08-08 VITALS
HEIGHT: 66 IN | DIASTOLIC BLOOD PRESSURE: 70 MMHG | WEIGHT: 197 LBS | OXYGEN SATURATION: 98 % | SYSTOLIC BLOOD PRESSURE: 128 MMHG | TEMPERATURE: 98.2 F | HEART RATE: 93 BPM | RESPIRATION RATE: 14 BRPM | BODY MASS INDEX: 31.66 KG/M2

## 2025-08-08 DIAGNOSIS — K59.09 OTHER CONSTIPATION: ICD-10-CM

## 2025-08-08 DIAGNOSIS — E11.9 TYPE 2 DIABETES MELLITUS W/OUT COMPLICATIONS: ICD-10-CM

## 2025-08-08 DIAGNOSIS — K21.9 GASTRO-ESOPHAGEAL REFLUX DISEASE W/OUT ESOPHAGITIS: ICD-10-CM

## 2025-08-08 DIAGNOSIS — N52.9 MALE ERECTILE DYSFUNCTION, UNSPECIFIED: ICD-10-CM

## 2025-08-08 DIAGNOSIS — E78.5 HYPERLIPIDEMIA, UNSPECIFIED: ICD-10-CM

## 2025-08-08 DIAGNOSIS — N40.1 BENIGN PROSTATIC HYPERPLASIA WITH LOWER URINARY TRACT SYMPMS: ICD-10-CM

## 2025-08-08 DIAGNOSIS — I10 ESSENTIAL (PRIMARY) HYPERTENSION: ICD-10-CM

## 2025-08-08 DIAGNOSIS — N13.8 BENIGN PROSTATIC HYPERPLASIA WITH LOWER URINARY TRACT SYMPMS: ICD-10-CM

## 2025-08-08 DIAGNOSIS — I25.10 ATHEROSCLEROTIC HEART DISEASE OF NATIVE CORONARY ARTERY W/OUT ANGINA PECTORIS: ICD-10-CM

## 2025-08-08 PROCEDURE — 99214 OFFICE O/P EST MOD 30 MIN: CPT

## 2025-08-08 PROCEDURE — G2211 COMPLEX E/M VISIT ADD ON: CPT

## 2025-08-08 PROCEDURE — 83036 HEMOGLOBIN GLYCOSYLATED A1C: CPT | Mod: QW

## 2025-08-08 RX ORDER — DOCUSATE SODIUM 100 MG/1
100 CAPSULE, LIQUID FILLED ORAL TWICE DAILY
Qty: 60 | Refills: 1 | Status: ACTIVE | COMMUNITY
Start: 2025-08-08 | End: 1900-01-01

## 2025-08-08 RX ORDER — SIMETHICONE 180 MG
180 CAPSULE ORAL 4 TIMES DAILY
Qty: 1 | Refills: 3 | Status: ACTIVE | COMMUNITY
Start: 2025-08-08 | End: 1900-01-01

## 2025-08-11 DIAGNOSIS — K59.00 CONSTIPATION, UNSPECIFIED: ICD-10-CM

## 2025-08-11 RX ORDER — POLYETHYLENE GLYCOL 3350 17 G/17G
17 POWDER, FOR SOLUTION ORAL DAILY
Qty: 1 | Refills: 0 | Status: ACTIVE | COMMUNITY
Start: 2025-08-11 | End: 1900-01-01

## 2025-08-11 RX ORDER — LORATADINE 10 MG
17 TABLET,DISINTEGRATING ORAL DAILY
Qty: 1 | Refills: 3 | Status: DISCONTINUED | COMMUNITY
Start: 2025-08-08 | End: 2025-08-11

## 2025-09-08 ENCOUNTER — APPOINTMENT (OUTPATIENT)
Dept: FAMILY MEDICINE | Facility: CLINIC | Age: 70
End: 2025-09-08
Payer: MEDICARE

## 2025-09-08 VITALS
BODY MASS INDEX: 31.82 KG/M2 | DIASTOLIC BLOOD PRESSURE: 82 MMHG | SYSTOLIC BLOOD PRESSURE: 126 MMHG | HEART RATE: 110 BPM | TEMPERATURE: 98.1 F | HEIGHT: 66 IN | RESPIRATION RATE: 14 BRPM | WEIGHT: 198 LBS | OXYGEN SATURATION: 95 %

## 2025-09-08 DIAGNOSIS — R07.89 OTHER CHEST PAIN: ICD-10-CM

## 2025-09-08 DIAGNOSIS — N40.1 BENIGN PROSTATIC HYPERPLASIA WITH LOWER URINARY TRACT SYMPMS: ICD-10-CM

## 2025-09-08 DIAGNOSIS — E11.9 TYPE 2 DIABETES MELLITUS W/OUT COMPLICATIONS: ICD-10-CM

## 2025-09-08 DIAGNOSIS — E11.42 TYPE 2 DIABETES MELLITUS WITH DIABETIC POLYNEUROPATHY: ICD-10-CM

## 2025-09-08 DIAGNOSIS — M54.50 LOW BACK PAIN, UNSPECIFIED: ICD-10-CM

## 2025-09-08 DIAGNOSIS — N52.9 MALE ERECTILE DYSFUNCTION, UNSPECIFIED: ICD-10-CM

## 2025-09-08 DIAGNOSIS — R14.0 ABDOMINAL DISTENSION (GASEOUS): ICD-10-CM

## 2025-09-08 DIAGNOSIS — R53.83 OTHER MALAISE: ICD-10-CM

## 2025-09-08 DIAGNOSIS — N13.8 BENIGN PROSTATIC HYPERPLASIA WITH LOWER URINARY TRACT SYMPMS: ICD-10-CM

## 2025-09-08 DIAGNOSIS — I10 ESSENTIAL (PRIMARY) HYPERTENSION: ICD-10-CM

## 2025-09-08 DIAGNOSIS — R10.9 UNSPECIFIED ABDOMINAL PAIN: ICD-10-CM

## 2025-09-08 DIAGNOSIS — R53.81 OTHER MALAISE: ICD-10-CM

## 2025-09-08 PROCEDURE — G2211 COMPLEX E/M VISIT ADD ON: CPT

## 2025-09-08 PROCEDURE — 99214 OFFICE O/P EST MOD 30 MIN: CPT

## 2025-09-08 RX ORDER — SUCRALFATE 1 G/10ML
1 SUSPENSION ORAL 4 TIMES DAILY
Qty: 1 | Refills: 0 | Status: ACTIVE | COMMUNITY
Start: 2025-09-08 | End: 1900-01-01

## 2025-09-08 RX ORDER — TIZANIDINE 2 MG/1
2 TABLET ORAL
Qty: 15 | Refills: 0 | Status: ACTIVE | COMMUNITY
Start: 2025-09-08 | End: 1900-01-01

## 2025-09-09 ENCOUNTER — RX RENEWAL (OUTPATIENT)
Age: 70
End: 2025-09-09

## 2025-09-13 ENCOUNTER — RESULT REVIEW (OUTPATIENT)
Age: 70
End: 2025-09-13

## 2025-09-15 ENCOUNTER — RESULT REVIEW (OUTPATIENT)
Age: 70
End: 2025-09-15

## 2025-09-15 ENCOUNTER — TRANSCRIPTION ENCOUNTER (OUTPATIENT)
Age: 70
End: 2025-09-15

## 2025-09-15 LAB — UREA BREATH TEST QL: NEGATIVE

## 2025-09-18 ENCOUNTER — NON-APPOINTMENT (OUTPATIENT)
Age: 70
End: 2025-09-18

## 2025-09-18 PROBLEM — M54.9 DORSALGIA, UNSPECIFIED: Chronic | Status: ACTIVE | Noted: 2025-09-13

## 2025-09-18 PROBLEM — I10 ESSENTIAL (PRIMARY) HYPERTENSION: Chronic | Status: ACTIVE | Noted: 2025-09-13

## 2025-09-18 PROBLEM — I25.10 ATHEROSCLEROTIC HEART DISEASE OF NATIVE CORONARY ARTERY WITHOUT ANGINA PECTORIS: Chronic | Status: ACTIVE | Noted: 2025-09-13
